# Patient Record
Sex: MALE | Race: WHITE | NOT HISPANIC OR LATINO | Employment: OTHER | URBAN - NONMETROPOLITAN AREA
[De-identification: names, ages, dates, MRNs, and addresses within clinical notes are randomized per-mention and may not be internally consistent; named-entity substitution may affect disease eponyms.]

---

## 2017-01-11 ENCOUNTER — OFFICE VISIT (OUTPATIENT)
Dept: FAMILY MEDICINE CLINIC | Facility: CLINIC | Age: 52
End: 2017-01-11

## 2017-01-11 VITALS
WEIGHT: 194 LBS | SYSTOLIC BLOOD PRESSURE: 126 MMHG | HEIGHT: 70 IN | BODY MASS INDEX: 27.77 KG/M2 | DIASTOLIC BLOOD PRESSURE: 82 MMHG

## 2017-01-11 DIAGNOSIS — M48.061 SPINAL STENOSIS OF LUMBAR REGION: ICD-10-CM

## 2017-01-11 DIAGNOSIS — I10 ESSENTIAL HYPERTENSION: Primary | ICD-10-CM

## 2017-01-11 DIAGNOSIS — Z11.59 NEED FOR HEPATITIS C SCREENING TEST: ICD-10-CM

## 2017-01-11 DIAGNOSIS — R20.0 BILATERAL HAND NUMBNESS: ICD-10-CM

## 2017-01-11 DIAGNOSIS — G89.29 CHRONIC BILATERAL LOW BACK PAIN WITH RIGHT-SIDED SCIATICA: ICD-10-CM

## 2017-01-11 DIAGNOSIS — R11.0 NAUSEA: ICD-10-CM

## 2017-01-11 DIAGNOSIS — M54.41 CHRONIC BILATERAL LOW BACK PAIN WITH RIGHT-SIDED SCIATICA: ICD-10-CM

## 2017-01-11 LAB
ALBUMIN SERPL-MCNC: 3.9 GM/DL (ref 3.4–4.8)
ALP SERPL-CCNC: 66 U/L (ref 38–126)
ALT SERPL-CCNC: 39 U/L (ref 21–72)
ANION GAP SERPL CALCULATED.3IONS-SCNC: 11 MMOL/L (ref 5–15)
AST SERPL-CCNC: 35 U/L (ref 17–59)
BILIRUB SERPL-MCNC: 0.8 MG/DL (ref 0.2–1.3)
BUN SERPL-MCNC: 13 MG/DL (ref 7–21)
CALCIUM SERPL-MCNC: 9.1 MG/DL (ref 8.4–10.2)
CHLORIDE SERPL-SCNC: 101 MMOL/L (ref 95–110)
CO2 SERPL-SCNC: 30 MMOL/L (ref 22–31)
CREAT SERPL-MCNC: 1 MG/DL (ref 0.7–1.3)
GLUCOSE SERPL-MCNC: 91 MG/DL (ref 60–100)
POTASSIUM SERPL-SCNC: 3.5 MMOL/L (ref 3.5–5.1)
PROT SERPL-MCNC: 6.6 GM/DL (ref 6.3–8.6)
SODIUM SERPL-SCNC: 142 MMOL/L (ref 137–145)

## 2017-01-11 PROCEDURE — 99214 OFFICE O/P EST MOD 30 MIN: CPT | Performed by: FAMILY MEDICINE

## 2017-01-11 RX ORDER — LIDOCAINE 50 MG/G
OINTMENT TOPICAL
COMMUNITY
Start: 2016-12-09 | End: 2018-01-24

## 2017-01-11 RX ORDER — ONDANSETRON 4 MG/1
4 TABLET, ORALLY DISINTEGRATING ORAL ONCE
Status: COMPLETED | OUTPATIENT
Start: 2017-01-11 | End: 2017-01-11

## 2017-01-11 RX ORDER — TRAMADOL HYDROCHLORIDE 50 MG/1
50 TABLET ORAL EVERY 6 HOURS PRN
Qty: 120 TABLET | Refills: 1 | Status: SHIPPED | OUTPATIENT
Start: 2017-01-11 | End: 2017-03-10 | Stop reason: SDUPTHER

## 2017-01-11 RX ORDER — ONDANSETRON HYDROCHLORIDE 8 MG/1
4 TABLET, FILM COATED ORAL EVERY 8 HOURS PRN
Qty: 12 TABLET | Refills: 0 | Status: SHIPPED | OUTPATIENT
Start: 2017-01-11 | End: 2017-05-10

## 2017-01-11 RX ADMIN — ONDANSETRON 4 MG: 4 TABLET, ORALLY DISINTEGRATING ORAL at 15:40

## 2017-01-11 NOTE — MR AVS SNAPSHOT
Evangelist Gipson   1/11/2017 2:45 PM   Office Visit    Dept Phone:  [phone]   Encounter #:  [id number]    Provider:  Ashley Watts MD   Department:  [facility] FAMILY MEDICINE                Your Full Care Plan              Today's Medication Changes          These changes are accurate as of: 1/11/17  3:40 PM.  If you have any questions, ask your nurse or doctor.               New Medication(s)Ordered:     ondansetron 8 MG tablet   Commonly known as:  ZOFRAN   Take 0.5 tablets by mouth Every 8 (Eight) Hours As Needed for nausea or vomiting.   Started by:  Ashley Watts MD            Where to Get Your Medications      These medications were sent to [facility] - [place], [place] - [address] - [phone]  - [phone]   [address] [address]     Phone:  [phone]     ondansetron 8 MG tablet         You can get these medications from any pharmacy     Bring a paper prescription for each of these medications     traMADol 50 MG tablet                  Your Updated Medication List          This list is accurate as of: 1/11/17  3:40 PM.  Always use your most recent med list.                albuterol 108 (90 BASE) MCG/ACT inhaler   Commonly known as:  PROAIR RESPICLICK       amLODIPine 10 MG tablet   Commonly known as:  NORVASC   TAKE 1 TABLET BY MOUTH DAILY.       aspirin 81 MG EC tablet       baclofen 20 MG tablet   Commonly known as:  LIORESAL   TAKE ONE TABLET BY MOUTH THREE TIMES A DAY       cetirizine 10 MG tablet   Commonly known as:  zyrTEC   TAKE ONE TABLET EACH DAY.       clonazePAM 0.5 MG tablet   Commonly known as:  KlonoPIN       cyclobenzaprine 5 MG tablet   Commonly known as:  FLEXERIL   TAKE 1-2 TABLETS BY MOUTH TWO TIMES A DAY AS NEEDED.       EPIPEN 2-YENNY 0.3 MG/0.3ML solution auto-injector injection   Generic drug:  EPINEPHrine       fluticasone 50 MCG/ACT nasal spray   Commonly known as:  FLONASE       gabapentin 600 MG tablet   Commonly known as:  NEURONTIN   TAKE ONE TABLET BY MOUTH THREE TIMES A DAY       hydrochlorothiazide 25 MG tablet   Commonly known as:  HYDRODIURIL   Take 1 tablet by mouth Daily.       lidocaine 5 % ointment   Commonly known as:  XYLOCAINE       * metoprolol tartrate 100 MG tablet   Commonly known as:  LOPRESSOR   Take 1 tablet by mouth 2 (Two) Times a Day.       * metoprolol tartrate 100 MG tablet   Commonly known as:  LOPRESSOR   TAKE 1 TABLET BY MOUTH 2 (TWO) TIMES A DAY.       montelukast 10 MG tablet   Commonly known as:  SINGULAIR       ondansetron 8 MG tablet   Commonly known as:  ZOFRAN   Take 0.5 tablets by mouth Every 8 (Eight) Hours As Needed for nausea or vomiting.       pravastatin 20 MG tablet   Commonly known as:  PRAVACHOL   Take 1 tablet by mouth Daily.       traMADol 50 MG tablet   Commonly known as:  ULTRAM   Take 1 tablet by mouth Every 6 (Six) Hours As Needed for moderate pain (4-6).       * Notice:  This list has 2 medication(s) that are the same as other medications prescribed for you. Read the directions carefully, and ask your doctor or other care provider to review them with you.            We Performed the Following     Ambulatory Referral to Neurology     Comprehensive Metabolic Panel     Hepatitis C Antibody       You Were Diagnosed With        Codes Comments    Essential hypertension    -  Primary ICD-10-CM: I10  ICD-9-CM: 401.9     Bilateral hand numbness     ICD-10-CM: R20.0  ICD-9-CM: 782.0     Chronic bilateral low back pain with right-sided sciatica     ICD-10-CM: M54.41, G89.29  ICD-9-CM: 724.2, 724.3, 338.29     Spinal stenosis of lumbar region     ICD-10-CM: M48.06  ICD-9-CM: 724.02     Need for hepatitis C screening test     ICD-10-CM: Z11.59  ICD-9-CM: V73.89     Nausea     ICD-10-CM: R11.0  ICD-9-CM: 787.02       Medications to be Given to You by a Medical Professional     Due       Frequency    1/11/2017 ondansetron ODT (ZOFRAN-ODT) disintegrating  tablet 4 mg  Once      Instructions     None    Patient Instructions History      Upcoming Appointments     Visit Type Date Time Department    FOLLOW UP 2017  2:45 PM [facility] FAM MED MAD 4TH    FOLLOW UP 2017 10:30 AM [facility] PAIN MNGT [facility]AD    NEW PATIENT 2017  2:00 PM [facility] CT VAS SURGERY MAD    OFFICE VISIT 3/10/2017 11:00 AM [facility] FAM MED MAD 4TH      MyChart Signup     [facility] "[employer] allows you to send messages to your doctor, view your test results, renew your prescriptions, schedule appointments, and more. To sign up, go to [email or web address] and click on the Sign Up Now link in the New User? box. Enter your "[employer] Activation Code exactly as it appears below along with the last four digits of your Social Security Number and your Date of Birth () to complete the sign-up process. If you do not sign up before the expiration date, you must request a new code.    "[employer] Activation Code: [email or web address]  Expires: 2017  3:40 PM    If you have questions, you can email [email or web address] or call [phone] to talk to our [employer] staff. Remember, "[employer] is NOT to be used for urgent needs. For medical emergencies, dial 911.               Other Info from Your Visit           Your Appointments     2017 10:30 AM CST   Follow Up with Dickson Whitlock MD   [facility] PAIN MANAGEMENT (--)    [address]  [address] [place] [id number] [phone]           Arrive 15 minutes prior to appointment.            2017  2:00 PM CST   New Patient with William Stevenson MD   [facility] CARDIOTHORACIC AND VASCULAR SURGERY (--)    [address]  [address] [place] [id number] [phone]           Bring all previous medical records and films, along with current medications and insurance information.            Mar 10, 2017 11:00 AM CST   Office Visit with Ashley Watts MD   [facility]  "[facility] FAMILY MEDICINE (--)    [address]  [address] [place] [id number] [phone]           Arrive 15 minutes prior to appointment.              Allergies     Ambien [Zolpidem Tartrate]      Beef-derived Products      Dairy Aid [Lactase]      Latex      Pork-derived Products      Remeron [Mirtazapine]        Reason for Visit     Follow-up recheck for malignant hypertension      Vital Signs     Blood Pressure Height Weight Body Mass Index Smoking Status       126/82 70\" (177.8 cm) 194 lb (88 kg) 27.84 kg/m2 Former Smoker       Problems and Diagnoses Noted     High blood pressure    Narrowing of spinal canal    Bilateral hand numbness        Chronic bilateral low back pain with right-sided sciatica        Need for hepatitis C screening test        Nauseous            "

## 2017-01-11 NOTE — PROGRESS NOTES
Subjective   Evangelist Gipson is a 51 y.o. male.     Hypertension   This is a chronic problem. The current episode started more than 1 year ago. The problem is unchanged. The problem is controlled. Associated symptoms include headaches, malaise/fatigue and neck pain. Pertinent negatives include no anxiety, blurred vision, chest pain, orthopnea, palpitations, peripheral edema, PND, shortness of breath or sweats. There are no associated agents to hypertension. Past treatments include calcium channel blockers, diuretics and beta blockers. The current treatment provides moderate improvement. There are no compliance problems.  There is no history of angina, kidney disease, CAD/MI, CVA, heart failure, left ventricular hypertrophy, PVD or retinopathy.   Back Pain   This is a chronic problem. The current episode started more than 1 month ago. The problem occurs constantly. The problem has been gradually worsening since onset. The pain is present in the lumbar spine and thoracic spine (Has moved up to his mid back too). The quality of the pain is described as aching and shooting. The pain radiates to the right thigh. The pain is at a severity of 8/10. The pain is moderate. The pain is the same all the time. The symptoms are aggravated by lying down. Stiffness is present all day. Associated symptoms include headaches, leg pain, numbness, tingling and weakness. Pertinent negatives include no chest pain. He has tried analgesics (He has an appointment with Pain Management to try injections again.  Tramadol is helping him a lot.) for the symptoms. The treatment provided mild relief.     He has been having issues with his hands going numb and he has been dropping things and they go numb.  Has been progressive over last several months.      Current Outpatient Prescriptions:   •  albuterol (PROAIR RESPICLICK) 108 (90 BASE) MCG/ACT inhaler, Inhale 2 puffs every 4 (four) hours as needed for shortness of air., Disp: , Rfl:   •   amLODIPine (NORVASC) 10 MG tablet, TAKE 1 TABLET BY MOUTH DAILY., Disp: 30 tablet, Rfl: 2  •  aspirin 81 MG EC tablet, Take 81 mg by mouth Daily., Disp: , Rfl:   •  baclofen (LIORESAL) 20 MG tablet, TAKE ONE TABLET BY MOUTH THREE TIMES A DAY, Disp: 90 tablet, Rfl: 2  •  cetirizine (zyrTEC) 10 MG tablet, TAKE ONE TABLET EACH DAY., Disp: 90 tablet, Rfl: 8  •  clonazePAM (KlonoPIN) 0.5 MG tablet, Take 0.5 mg by mouth 3 (three) times a day as needed., Disp: , Rfl:   •  cyclobenzaprine (FLEXERIL) 5 MG tablet, TAKE 1-2 TABLETS BY MOUTH TWO TIMES A DAY AS NEEDED., Disp: 120 tablet, Rfl: 2  •  EPINEPHrine (EPIPEN 2-YENNY) 0.3 MG/0.3ML solution auto-injector injection, , Disp: , Rfl:   •  fluticasone (FLONASE) 50 MCG/ACT nasal spray, 1 spray into each nostril 2 (two) times a day., Disp: , Rfl:   •  gabapentin (NEURONTIN) 600 MG tablet, TAKE ONE TABLET BY MOUTH THREE TIMES A DAY, Disp: 90 tablet, Rfl: 2  •  hydrochlorothiazide (HYDRODIURIL) 25 MG tablet, Take 1 tablet by mouth Daily., Disp: 30 tablet, Rfl: 3  •  lidocaine (XYLOCAINE) 5 % ointment, , Disp: , Rfl:   •  metoprolol tartrate (LOPRESSOR) 100 MG tablet, Take 1 tablet by mouth 2 (Two) Times a Day., Disp: 60 tablet, Rfl: 2  •  metoprolol tartrate (LOPRESSOR) 100 MG tablet, TAKE 1 TABLET BY MOUTH 2 (TWO) TIMES A DAY., Disp: 60 tablet, Rfl: 2  •  montelukast (SINGULAIR) 10 MG tablet, Take 10 mg by mouth every night., Disp: , Rfl:   •  pravastatin (PRAVACHOL) 20 MG tablet, Take 1 tablet by mouth Daily., Disp: 90 tablet, Rfl: 2  •  traMADol (ULTRAM) 50 MG tablet, Take 1 tablet by mouth Every 6 (Six) Hours As Needed for moderate pain (4-6)., Disp: 120 tablet, Rfl: 1    The following portions of the patient's history were reviewed and updated as appropriate: allergies, current medications, past family history, past medical history, past social history, past surgical history and problem list.    Review of Systems   Constitutional: Positive for fatigue and malaise/fatigue.  "Negative for activity change, appetite change and unexpected weight change.   Eyes: Negative for blurred vision.   Respiratory: Negative for chest tightness and shortness of breath.    Cardiovascular: Negative for chest pain, palpitations, orthopnea, leg swelling and PND.   Musculoskeletal: Positive for arthralgias, back pain, gait problem, myalgias and neck pain.   Skin: Negative for pallor, rash and wound.   Neurological: Positive for tingling, weakness, numbness and headaches.   Psychiatric/Behavioral: Positive for sleep disturbance. Negative for decreased concentration, dysphoric mood, self-injury and suicidal ideas. The patient is not nervous/anxious.        Objective    Vitals:    01/11/17 1504   BP: 126/82   Weight: 194 lb (88 kg)   Height: 70\" (177.8 cm)       Physical Exam   Constitutional: He appears well-developed and well-nourished. No distress.   HENT:   Head: Normocephalic and atraumatic.   Cardiovascular: Normal rate, regular rhythm and normal heart sounds.    No murmur heard.  Pulmonary/Chest: Effort normal and breath sounds normal. No respiratory distress. He exhibits no tenderness.   Musculoskeletal:        Lumbar back: He exhibits decreased range of motion, tenderness, laceration and pain. He exhibits no deformity.   Weakened  strength on the right upper ext.  Positive straight leg test on the left lower side.   Psychiatric: He has a normal mood and affect. His behavior is normal. Judgment and thought content normal.   Nursing note and vitals reviewed.      Assessment/Plan   Problems Addressed this Visit        Cardiovascular and Mediastinum    Hypertension - Primary    Relevant Orders    Comprehensive Metabolic Panel (Completed)       Other    Spinal stenosis of lumbar region    Relevant Medications    traMADol (ULTRAM) 50 MG tablet      Other Visit Diagnoses     Chronic bilateral low back pain with right-sided sciatica        Bilateral hand numbness        Relevant Orders    Ambulatory " Referral to Neurology    Need for hepatitis C screening test        Relevant Orders    Hepatitis C Antibody    Nausea        Relevant Medications    ondansetron ODT (ZOFRAN-ODT) disintegrating tablet 4 mg (Completed)        1.) HTN-  Will continue current medications.  Reviewed logs.  Has been working well.  Check CMP today.  2.) Spinal Stenosis/back pain-  Follow-up with Dr. Whitlock as planned.  I believe that he may be candidate for surgery.  He had been seeing neurosurgeon in [place], but he wants him to try the injections first.  Will continue tramdol for now.    3.) Hand tingling and weaknes-  Referred to neurology for EMG.  I am concerned that this is from neck/ thoracic spine issues.  4.) Hep C screening today.  RTC in 2 months or sooner PRN

## 2017-01-13 LAB — HCV AB SERPL QL IA: NEGATIVE

## 2017-01-16 ENCOUNTER — TELEPHONE (OUTPATIENT)
Dept: FAMILY MEDICINE CLINIC | Facility: CLINIC | Age: 52
End: 2017-01-16

## 2017-01-16 ENCOUNTER — OFFICE VISIT (OUTPATIENT)
Dept: PAIN MEDICINE | Facility: CLINIC | Age: 52
End: 2017-01-16

## 2017-01-16 VITALS
SYSTOLIC BLOOD PRESSURE: 160 MMHG | HEIGHT: 70 IN | DIASTOLIC BLOOD PRESSURE: 94 MMHG | BODY MASS INDEX: 27.4 KG/M2 | WEIGHT: 191.4 LBS

## 2017-01-16 DIAGNOSIS — M54.16 LUMBAR RADICULOPATHY: ICD-10-CM

## 2017-01-16 DIAGNOSIS — M47.817 LUMBOSACRAL SPONDYLOSIS WITHOUT MYELOPATHY: ICD-10-CM

## 2017-01-16 DIAGNOSIS — M50.30 DDD (DEGENERATIVE DISC DISEASE), CERVICAL: ICD-10-CM

## 2017-01-16 DIAGNOSIS — M48.061 SPINAL STENOSIS OF LUMBAR REGION: Primary | ICD-10-CM

## 2017-01-16 PROCEDURE — 99212 OFFICE O/P EST SF 10 MIN: CPT | Performed by: PAIN MEDICINE

## 2017-01-16 NOTE — MR AVS SNAPSHOT
Evangelist Gipson   1/16/2017 10:30 AM   Office Visit    Dept Phone:  [phone]   Encounter #:  [id number]    Provider:  Dickson Whitlock MD   Department:  [facility] PAIN MANAGEMENT                Your Full Care Plan              Your Updated Medication List          This list is accurate as of: 1/16/17 11:05 AM.  Always use your most recent med list.                albuterol 108 (90 BASE) MCG/ACT inhaler   Commonly known as:  PROAIR RESPICLICK       amLODIPine 10 MG tablet   Commonly known as:  NORVASC   TAKE 1 TABLET BY MOUTH DAILY.       aspirin 81 MG EC tablet       baclofen 20 MG tablet   Commonly known as:  LIORESAL   TAKE ONE TABLET BY MOUTH THREE TIMES A DAY       cetirizine 10 MG tablet   Commonly known as:  zyrTEC   TAKE ONE TABLET EACH DAY.       clonazePAM 0.5 MG tablet   Commonly known as:  KlonoPIN       cyclobenzaprine 5 MG tablet   Commonly known as:  FLEXERIL   TAKE 1-2 TABLETS BY MOUTH TWO TIMES A DAY AS NEEDED.       EPIPEN 2-YENNY 0.3 MG/0.3ML solution auto-injector injection   Generic drug:  EPINEPHrine       fluticasone 50 MCG/ACT nasal spray   Commonly known as:  FLONASE       gabapentin 600 MG tablet   Commonly known as:  NEURONTIN   TAKE ONE TABLET BY MOUTH THREE TIMES A DAY       hydrochlorothiazide 25 MG tablet   Commonly known as:  HYDRODIURIL   Take 1 tablet by mouth Daily.       lidocaine 5 % ointment   Commonly known as:  XYLOCAINE       * metoprolol tartrate 100 MG tablet   Commonly known as:  LOPRESSOR   Take 1 tablet by mouth 2 (Two) Times a Day.       * metoprolol tartrate 100 MG tablet   Commonly known as:  LOPRESSOR   TAKE 1 TABLET BY MOUTH 2 (TWO) TIMES A DAY.       montelukast 10 MG tablet   Commonly known as:  SINGULAIR       ondansetron 8 MG tablet   Commonly known as:  ZOFRAN   Take 0.5 tablets by mouth Every 8 (Eight) Hours As Needed for nausea or vomiting.       pravastatin 20 MG tablet   Commonly known as:  PRAVACHOL   Take 1  tablet by mouth Daily.       traMADol 50 MG tablet   Commonly known as:  ULTRAM   Take 1 tablet by mouth Every 6 (Six) Hours As Needed for moderate pain (4-6).       * Notice:  This list has 2 medication(s) that are the same as other medications prescribed for you. Read the directions carefully, and ask your doctor or other care provider to review them with you.            You Were Diagnosed With        Codes Comments    Spinal stenosis of lumbar region    -  Primary ICD-10-CM: M48.06  ICD-9-CM: 724.02     Lumbosacral spondylosis without myelopathy     ICD-10-CM: M47.817  ICD-9-CM: 721.3     DDD (degenerative disc disease), cervical     ICD-10-CM: M50.30  ICD-9-CM: 722.4     Lumbar radiculopathy     ICD-10-CM: M54.16  ICD-9-CM: 724.4       Instructions     None    Patient Instructions History      Upcoming Appointments     Visit Type Date Time Department    FOLLOW UP 2017 10:30 AM [facility] PAIN MNGT MAD    NEW PATIENT 2017  2:00 PM [facility] CT VAS SURGERY MAD    OFFICE VISIT 3/10/2017 11:00 AM [facility] FAM MED MAD 4TH      MyChart Signup     [facility] [employer] allows you to send messages to your doctor, view your test results, renew your prescriptions, schedule appointments, and more. To sign up, go to [employer] and click on the Sign Up Now link in the New User? box. Enter your [employer] Activation Code exactly as it appears below along with the last four digits of your Social Security Number and your Date of Birth () to complete the sign-up process. If you do not sign up before the expiration date, you must request a new code.    [employer] Activation Code: [email or web address]  Expires: 2017  3:40 PM    If you have questions, you can email [email or web address] or call [phone] to talk to our [employer] staff. Remember, [employer] is NOT to be used for urgent needs. For medical emergencies, dial 911.               Other Info from Your Visit           Your Appointments     2017  2:00 PM  "CST   New Patient with William Stevenson MD   [facility] CARDIOTHORACIC AND VASCULAR SURGERY (--)    [address]  [address] [facility] [address] [phone]           Bring all previous medical records and films, along with current medications and insurance information.            Mar 10, 2017 11:00 AM CST   Office Visit with Ashley Watts MD   [facility] FAMILY MEDICINE (--)    [address]  [address] [place] [address] [phone]           Arrive 15 minutes prior to appointment.              Allergies     Ambien [Zolpidem Tartrate]      Beef-derived Products      Dairy Aid [Lactase]      Latex      Pork-derived Products      Remeron [Mirtazapine]        Reason for Visit     Back Pain           Vital Signs     Blood Pressure Height Weight Body Mass Index Smoking Status       160/94 70\" (177.8 cm) 191 lb 6.4 oz (86.8 kg) 27.46 kg/m2 Former Smoker       Problems and Diagnoses Noted     Narrowing of spinal canal    Arthritis of low back        Degeneration of intervertebral disc of cervical region        Lumbar nerve root disorder            "

## 2017-01-16 NOTE — PROGRESS NOTES
Evangelist Gipson is a 51 y.o. male.   1965    HPI:   Location: lower back  Quality: stabbing, sharp and dull  Severity: 8/10  Timing: constant  Alleviating: relaxation  Aggravating: sitting  and ambulating      Utilizing tramadol for pain control.  Had Lesi which helped 100 per cent for 1 month and has gradually lessened in effectiveness to aobut 20% relief today..  Would like to try a second because the first helped so much.      The following portions of the patient's history were reviewed by me and updated as appropriate: allergies, current medications, past family history, past medical history, past social history, past surgical history and problem list.    Past Medical History   Diagnosis Date   • Allergic contact dermatitis    • Allergic rhinitis    • Anxiety    • Anxiety    • Arthritis    • Asthma    • Asthma    • Benign lipomatous neoplasm       Small lipoma clinically right chest wall      • Chest wall tenderness      right side of sternum   • Epilepsy    • Food allergy    • [place] de la Tourette's syndrome      neurology had been seeing him in [place]      • Headache    • Hypertension    • Hypertension    • Hypertension    • Insomnia    • Internal hemorrhoids    • Low back pain    • Malaise and fatigue    • Multiple joint pain    • Need for influenza vaccination    • Neuralgia and neuritis    • Polyp of intestine      +FH, leiomyoma      • Radiculitis    • Seborrheic dermatitis    • Skin sensation disturbance    • Spasm of back muscles    • Spinal stenosis of lumbar region        Social History     Social History   • Marital status:      Spouse name: N/A   • Number of children: N/A   • Years of education: N/A     Occupational History   • Not on file.     Social History Main Topics   • Smoking status: Former Smoker     Quit date: 1996   • Smokeless tobacco: Not on file   • Alcohol use No   • Drug use: No   • Sexual activity: Not on file     Other Topics Concern   • Not on file     Social History  Narrative       Family History   Problem Relation Age of Onset   • Heart disease Mother    • Osteoporosis Mother    • Lung cancer Father    • Prostate cancer Father    • Coronary artery disease Father    • COPD Father    • Hyperlipidemia Father    • Hypertension Father    • Diabetes Maternal Grandmother    • Hypertension Other    • Cancer Other    • Alcohol abuse Brother    • Coronary artery disease Brother    • COPD Brother    • Mental illness Brother          Current Outpatient Prescriptions:   •  albuterol (PROAIR RESPICLICK) 108 (90 BASE) MCG/ACT inhaler, Inhale 2 puffs every 4 (four) hours as needed for shortness of air., Disp: , Rfl:   •  amLODIPine (NORVASC) 10 MG tablet, TAKE 1 TABLET BY MOUTH DAILY., Disp: 30 tablet, Rfl: 2  •  aspirin 81 MG EC tablet, Take 81 mg by mouth Daily., Disp: , Rfl:   •  baclofen (LIORESAL) 20 MG tablet, TAKE ONE TABLET BY MOUTH THREE TIMES A DAY, Disp: 90 tablet, Rfl: 2  •  cetirizine (zyrTEC) 10 MG tablet, TAKE ONE TABLET EACH DAY., Disp: 90 tablet, Rfl: 8  •  clonazePAM (KlonoPIN) 0.5 MG tablet, Take 0.5 mg by mouth 3 (three) times a day as needed., Disp: , Rfl:   •  cyclobenzaprine (FLEXERIL) 5 MG tablet, TAKE 1-2 TABLETS BY MOUTH TWO TIMES A DAY AS NEEDED., Disp: 120 tablet, Rfl: 2  •  EPINEPHrine (EPIPEN 2-YENNY) 0.3 MG/0.3ML solution auto-injector injection, , Disp: , Rfl:   •  fluticasone (FLONASE) 50 MCG/ACT nasal spray, 1 spray into each nostril 2 (two) times a day., Disp: , Rfl:   •  gabapentin (NEURONTIN) 600 MG tablet, TAKE ONE TABLET BY MOUTH THREE TIMES A DAY, Disp: 90 tablet, Rfl: 2  •  hydrochlorothiazide (HYDRODIURIL) 25 MG tablet, Take 1 tablet by mouth Daily., Disp: 30 tablet, Rfl: 3  •  lidocaine (XYLOCAINE) 5 % ointment, , Disp: , Rfl:   •  metoprolol tartrate (LOPRESSOR) 100 MG tablet, Take 1 tablet by mouth 2 (Two) Times a Day., Disp: 60 tablet, Rfl: 2  •  metoprolol tartrate (LOPRESSOR) 100 MG tablet, TAKE 1 TABLET BY MOUTH 2 (TWO) TIMES A DAY., Disp: 60  tablet, Rfl: 2  •  montelukast (SINGULAIR) 10 MG tablet, Take 10 mg by mouth every night., Disp: , Rfl:   •  ondansetron (ZOFRAN) 8 MG tablet, Take 0.5 tablets by mouth Every 8 (Eight) Hours As Needed for nausea or vomiting., Disp: 12 tablet, Rfl: 0  •  pravastatin (PRAVACHOL) 20 MG tablet, Take 1 tablet by mouth Daily., Disp: 90 tablet, Rfl: 2  •  traMADol (ULTRAM) 50 MG tablet, Take 1 tablet by mouth Every 6 (Six) Hours As Needed for moderate pain (4-6)., Disp: 120 tablet, Rfl: 1    Allergies   Allergen Reactions   • Ambien [Zolpidem Tartrate]    • Beef-Derived Products    • Dairy Aid [Lactase]    • Latex    • Pork-Derived Products    • Remeron [Mirtazapine]          Review of Systems   Musculoskeletal: Positive for back pain.     10 system review of systems was reviewed and negative except for above.    Physical Exam   Constitutional: He appears well-developed and well-nourished. No distress.   Musculoskeletal:        Lumbar back: He exhibits decreased range of motion (80 deg flexion ext to 10 deg ).   Neurological: He is alert.   Reflex Scores:       Patellar reflexes are 2+ on the right side and 2+ on the left side.       Achilles reflexes are 2+ on the right side and 2+ on the left side.  slr on right   Psychiatric: He has a normal mood and affect. His behavior is normal. Judgment normal.       Evangelist was seen today for back pain.    Diagnoses and all orders for this visit:    Spinal stenosis of lumbar region    Lumbosacral spondylosis without myelopathy    DDD (degenerative disc disease), cervical  -     Epidural Block; Future    Lumbar radiculopathy  -     Epidural Block; Future        Medication: None at this time.  Tramadol provided by pcp.    Interventional:  I have discussed the risks and benefits of the procedure with the patient.   Repeat LESI.  Still enjoying relief after two months, but not as much as in beginning.  No longer on anticoagulant except asa.  If no relief from this injections, need to  f/u with his neurosurgeon.    Rehab: none at this time    Behavioral: No aberrant behavior noted. JUDITH Report #[id number]  was reviewed and is consistent with stated history    Urine drug screen None at this time          This document has been electronically signed by Dickson Whitlock MD on January 16, 2017 10:56 AM

## 2017-01-23 ENCOUNTER — OFFICE VISIT (OUTPATIENT)
Dept: CARDIAC SURGERY | Facility: CLINIC | Age: 52
End: 2017-01-23

## 2017-01-23 VITALS
OXYGEN SATURATION: 98 % | WEIGHT: 197.5 LBS | BODY MASS INDEX: 28.27 KG/M2 | TEMPERATURE: 98.4 F | SYSTOLIC BLOOD PRESSURE: 130 MMHG | DIASTOLIC BLOOD PRESSURE: 82 MMHG | HEART RATE: 80 BPM | HEIGHT: 70 IN

## 2017-01-23 DIAGNOSIS — M47.816 OSTEOARTHRITIS OF LUMBAR SPINE, UNSPECIFIED SPINAL OSTEOARTHRITIS COMPLICATION STATUS: ICD-10-CM

## 2017-01-23 DIAGNOSIS — M47.812 SPONDYLOSIS OF CERVICAL REGION WITHOUT MYELOPATHY OR RADICULOPATHY: ICD-10-CM

## 2017-01-23 DIAGNOSIS — I65.22 STENOSIS OF LEFT CAROTID ARTERY: Primary | ICD-10-CM

## 2017-01-23 DIAGNOSIS — M48.061 SPINAL STENOSIS OF LUMBAR REGION: ICD-10-CM

## 2017-01-23 DIAGNOSIS — I10 MALIGNANT ESSENTIAL HYPERTENSION: ICD-10-CM

## 2017-01-23 DIAGNOSIS — I10 ESSENTIAL HYPERTENSION: ICD-10-CM

## 2017-01-23 PROCEDURE — 99205 OFFICE O/P NEW HI 60 MIN: CPT | Performed by: THORACIC SURGERY (CARDIOTHORACIC VASCULAR SURGERY)

## 2017-01-26 ENCOUNTER — HOSPITAL ENCOUNTER (OUTPATIENT)
Dept: INTERVENTIONAL RADIOLOGY/VASCULAR | Facility: HOSPITAL | Age: 52
Discharge: HOME | End: 2017-01-26
Admitting: PAIN MEDICINE

## 2017-01-26 VITALS
HEART RATE: 58 BPM | SYSTOLIC BLOOD PRESSURE: 118 MMHG | DIASTOLIC BLOOD PRESSURE: 69 MMHG | RESPIRATION RATE: 18 BRPM | OXYGEN SATURATION: 98 %

## 2017-01-26 DIAGNOSIS — M54.2 NECK PAIN: ICD-10-CM

## 2017-01-26 DIAGNOSIS — M54.16 LUMBAR RADICULOPATHY: Primary | ICD-10-CM

## 2017-01-26 DIAGNOSIS — M51.36 DDD (DEGENERATIVE DISC DISEASE), LUMBAR: ICD-10-CM

## 2017-01-26 DIAGNOSIS — R29.898 WEAKNESS OF RIGHT HAND: Primary | ICD-10-CM

## 2017-01-26 PROCEDURE — 62323 NJX INTERLAMINAR LMBR/SAC: CPT | Performed by: PAIN MEDICINE

## 2017-01-26 PROCEDURE — 0 IOPAMIDOL 61 % SOLUTION: Performed by: PAIN MEDICINE

## 2017-01-26 PROCEDURE — 25010000002 METHYLPREDNISOLONE PER 80 MG: Performed by: PAIN MEDICINE

## 2017-01-26 RX ORDER — LIDOCAINE HYDROCHLORIDE 20 MG/ML
INJECTION, SOLUTION INFILTRATION; PERINEURAL
Status: COMPLETED | OUTPATIENT
Start: 2017-01-26 | End: 2017-01-26

## 2017-01-26 RX ORDER — METHYLPREDNISOLONE ACETATE 80 MG/ML
INJECTION, SUSPENSION INTRA-ARTICULAR; INTRALESIONAL; INTRAMUSCULAR; SOFT TISSUE
Status: COMPLETED | OUTPATIENT
Start: 2017-01-26 | End: 2017-01-26

## 2017-01-26 RX ADMIN — METHYLPREDNISOLONE ACETATE 80 MG: 80 INJECTION, SUSPENSION INTRA-ARTICULAR; INTRALESIONAL; INTRAMUSCULAR; SOFT TISSUE at 14:28

## 2017-01-26 RX ADMIN — IOPAMIDOL 2 ML: 612 INJECTION, SOLUTION INTRAVENOUS at 14:27

## 2017-01-26 RX ADMIN — LIDOCAINE HYDROCHLORIDE 2 ML: 20 INJECTION, SOLUTION INFILTRATION; PERINEURAL at 14:27

## 2017-01-27 RX ORDER — GABAPENTIN 600 MG/1
TABLET ORAL
Qty: 90 TABLET | Refills: 2 | Status: SHIPPED | OUTPATIENT
Start: 2017-01-27 | End: 2017-05-01 | Stop reason: SDUPTHER

## 2017-01-30 ENCOUNTER — HOSPITAL ENCOUNTER (OUTPATIENT)
Dept: MRI IMAGING | Facility: HOSPITAL | Age: 52
Discharge: HOME | End: 2017-01-30

## 2017-01-30 ENCOUNTER — HOSPITAL ENCOUNTER (OUTPATIENT)
Dept: MRI IMAGING | Facility: HOSPITAL | Age: 52
Discharge: HOME | End: 2017-01-30
Admitting: FAMILY MEDICINE

## 2017-01-30 DIAGNOSIS — M54.2 NECK PAIN: ICD-10-CM

## 2017-01-30 DIAGNOSIS — R29.898 WEAKNESS OF RIGHT HAND: ICD-10-CM

## 2017-01-30 PROCEDURE — 72141 MRI NECK SPINE W/O DYE: CPT

## 2017-01-30 PROCEDURE — 72146 MRI CHEST SPINE W/O DYE: CPT

## 2017-03-07 DIAGNOSIS — I10 ESSENTIAL HYPERTENSION: ICD-10-CM

## 2017-03-07 RX ORDER — HYDROCHLOROTHIAZIDE 25 MG/1
TABLET ORAL
Qty: 30 TABLET | Refills: 3 | Status: SHIPPED | OUTPATIENT
Start: 2017-03-07 | End: 2017-06-27 | Stop reason: SDUPTHER

## 2017-03-07 RX ORDER — BACLOFEN 20 MG/1
TABLET ORAL
Qty: 90 TABLET | Refills: 2 | Status: SHIPPED | OUTPATIENT
Start: 2017-03-07 | End: 2017-05-30 | Stop reason: SDUPTHER

## 2017-03-10 ENCOUNTER — OFFICE VISIT (OUTPATIENT)
Dept: FAMILY MEDICINE CLINIC | Facility: CLINIC | Age: 52
End: 2017-03-10

## 2017-03-10 VITALS
SYSTOLIC BLOOD PRESSURE: 132 MMHG | HEIGHT: 70 IN | BODY MASS INDEX: 28.26 KG/M2 | DIASTOLIC BLOOD PRESSURE: 80 MMHG | WEIGHT: 197.4 LBS | HEART RATE: 65 BPM

## 2017-03-10 DIAGNOSIS — I10 ESSENTIAL HYPERTENSION: Primary | ICD-10-CM

## 2017-03-10 DIAGNOSIS — M48.061 SPINAL STENOSIS OF LUMBAR REGION: ICD-10-CM

## 2017-03-10 DIAGNOSIS — G56.03 BILATERAL CARPAL TUNNEL SYNDROME: ICD-10-CM

## 2017-03-10 PROBLEM — M47.816 OSTEOARTHRITIS OF LUMBAR SPINE: Status: ACTIVE | Noted: 2017-02-14

## 2017-03-10 PROCEDURE — 99213 OFFICE O/P EST LOW 20 MIN: CPT | Performed by: FAMILY MEDICINE

## 2017-03-10 RX ORDER — TRAMADOL HYDROCHLORIDE 50 MG/1
50 TABLET ORAL EVERY 6 HOURS PRN
Qty: 120 TABLET | Refills: 2 | Status: SHIPPED | OUTPATIENT
Start: 2017-03-10 | End: 2017-06-09 | Stop reason: SDUPTHER

## 2017-03-10 NOTE — PROGRESS NOTES
Subjective   Evangelist Gipson is a 51 y.o. male.     Hypertension   This is a chronic problem. The current episode started more than 1 year ago. The problem is unchanged. The problem is controlled. Pertinent negatives include no anxiety, blurred vision, chest pain, headaches, malaise/fatigue, neck pain, orthopnea, palpitations, peripheral edema, PND, shortness of breath or sweats. There are no associated agents to hypertension. There are no known risk factors for coronary artery disease. Past treatments include calcium channel blockers, beta blockers and diuretics. The current treatment provides moderate improvement. There are no compliance problems.  There is no history of angina, kidney disease, CAD/MI, CVA, heart failure, left ventricular hypertrophy, PVD or retinopathy.   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  He saw neurology and had EMG that was suggestive of carpal tunnel braces and that has helped but he has been breaking out from the braces.  He is allergic to latex.  May need different braces.  He tried stockings and that he could try to wear and that didn't help.  He has had had back pain and that isn't getting any better.  He has been seeing neurosurgery and they are getting more imaging.  He has had epidural injections and those didn't seem to help last time.  Tramadol seems to help pretty well.  Would like to have surgery. Has appointment for further imaging.      Current Outpatient Prescriptions:   •  albuterol (PROAIR RESPICLICK) 108 (90 BASE) MCG/ACT inhaler, Inhale 2 puffs every 4 (four) hours as needed for shortness of air., Disp: , Rfl:   •  amLODIPine (NORVASC) 10 MG tablet, TAKE 1 TABLET BY MOUTH DAILY., Disp: 30 tablet, Rfl: 2  •  aspirin 81 MG EC tablet, Take 81 mg by mouth Daily., Disp: , Rfl:   •  baclofen (LIORESAL) 20 MG tablet, TAKE ONE TABLET BY MOUTH THREE TIMES A DAY, Disp: 90 tablet, Rfl: 2  •  cetirizine (zyrTEC) 10 MG  tablet, TAKE ONE TABLET EACH DAY., Disp: 90 tablet, Rfl: 8  •  clonazePAM (KlonoPIN) 0.5 MG tablet, Take 0.5 mg by mouth 3 (three) times a day as needed., Disp: , Rfl:   •  cyclobenzaprine (FLEXERIL) 5 MG tablet, TAKE 1-2 TABLETS BY MOUTH TWO TIMES A DAY AS NEEDED., Disp: 120 tablet, Rfl: 2  •  EPINEPHrine (EPIPEN 2-YENNY) 0.3 MG/0.3ML solution auto-injector injection, , Disp: , Rfl:   •  fluticasone (FLONASE) 50 MCG/ACT nasal spray, 1 spray into each nostril 2 (two) times a day., Disp: , Rfl:   •  gabapentin (NEURONTIN) 600 MG tablet, TAKE ONE TABLET BY MOUTH THREE TIMES A DAY, Disp: 90 tablet, Rfl: 2  •  hydrochlorothiazide (HYDRODIURIL) 25 MG tablet, TAKE 1 TABLET BY MOUTH DAILY., Disp: 30 tablet, Rfl: 3  •  lidocaine (XYLOCAINE) 5 % ointment, , Disp: , Rfl:   •  metoprolol tartrate (LOPRESSOR) 100 MG tablet, Take 1 tablet by mouth 2 (Two) Times a Day., Disp: 60 tablet, Rfl: 2  •  montelukast (SINGULAIR) 10 MG tablet, Take 10 mg by mouth every night., Disp: , Rfl:   •  ondansetron (ZOFRAN) 8 MG tablet, Take 0.5 tablets by mouth Every 8 (Eight) Hours As Needed for nausea or vomiting., Disp: 12 tablet, Rfl: 0  •  pravastatin (PRAVACHOL) 20 MG tablet, Take 1 tablet by mouth Daily., Disp: 90 tablet, Rfl: 2  •  traMADol (ULTRAM) 50 MG tablet, Take 1 tablet by mouth Every 6 (Six) Hours As Needed for moderate pain (4-6)., Disp: 120 tablet, Rfl: 1    The following portions of the patient's history were reviewed and updated as appropriate: allergies, current medications, past family history, past medical history, past social history, past surgical history and problem list.    Review of Systems   Constitutional: Positive for fatigue. Negative for activity change, appetite change, malaise/fatigue and unexpected weight change.   Eyes: Negative for blurred vision.   Respiratory: Negative for chest tightness and shortness of breath.    Cardiovascular: Negative for chest pain, palpitations, orthopnea, leg swelling and PND.  "  Musculoskeletal: Positive for arthralgias, back pain, gait problem and myalgias. Negative for neck pain.   Skin: Negative for pallor, rash and wound.   Neurological: Positive for weakness and numbness. Negative for headaches.   Psychiatric/Behavioral: Negative for decreased concentration, dysphoric mood, self-injury, sleep disturbance and suicidal ideas. The patient is not nervous/anxious.        Objective    Vitals:    03/10/17 1105   BP: 132/80   BP Location: Left arm   Patient Position: Sitting   Cuff Size: Adult   Pulse: 65   Weight: 197 lb 6.4 oz (89.5 kg)   Height: 70\" (177.8 cm)       Physical Exam   Constitutional: He appears well-developed and well-nourished. No distress.   HENT:   Head: Normocephalic and atraumatic.   Cardiovascular: Normal rate, regular rhythm and normal heart sounds.    No murmur heard.  Pulmonary/Chest: Effort normal and breath sounds normal. No respiratory distress. He exhibits no tenderness.   Musculoskeletal:        Lumbar back: He exhibits decreased range of motion, tenderness, laceration and pain. He exhibits no deformity.   Weakened  strength on the right upper ext.  Positive straight leg test on the left lower side.   Psychiatric: He has a normal mood and affect. His behavior is normal. Judgment and thought content normal.   Nursing note and vitals reviewed.      Assessment/Plan   Problems Addressed this Visit        Cardiovascular and Mediastinum    Hypertension - Primary       Other    Spinal stenosis of lumbar region    Relevant Medications    traMADol (ULTRAM) 50 MG tablet      Other Visit Diagnoses     Bilateral carpal tunnel syndrome            1.) HTN- Doing much better and less side effects with medication that he is on.  Will continue and continue to monitor at home as well.  2.) Spinal Stenosis- Follow-up with neurosurgery as planned.  Continue tramadol for now.  3.) Carpal Tunnel-  Wrote for latex free braces today and will see if that is better tolerated.   RTC " in 2 months or sooner PRN

## 2017-03-14 DIAGNOSIS — I10 ESSENTIAL HYPERTENSION: ICD-10-CM

## 2017-03-14 RX ORDER — AMLODIPINE BESYLATE 10 MG/1
TABLET ORAL
Qty: 30 TABLET | Refills: 2 | Status: SHIPPED | OUTPATIENT
Start: 2017-03-14 | End: 2017-06-05 | Stop reason: SDUPTHER

## 2017-03-14 RX ORDER — METOPROLOL TARTRATE 100 MG/1
TABLET ORAL
Qty: 60 TABLET | Refills: 2 | Status: SHIPPED | OUTPATIENT
Start: 2017-03-14 | End: 2017-05-10

## 2017-03-14 RX ORDER — CYCLOBENZAPRINE HCL 5 MG
TABLET ORAL
Qty: 120 TABLET | Refills: 2 | Status: SHIPPED | OUTPATIENT
Start: 2017-03-14 | End: 2017-09-21 | Stop reason: SDUPTHER

## 2017-03-22 RX ORDER — MONTELUKAST SODIUM 10 MG/1
TABLET ORAL
Qty: 90 TABLET | Refills: 2 | Status: SHIPPED | OUTPATIENT
Start: 2017-03-22 | End: 2017-12-07 | Stop reason: SDUPTHER

## 2017-04-04 PROBLEM — I65.22 STENOSIS OF LEFT CAROTID ARTERY: Status: ACTIVE | Noted: 2017-04-04

## 2017-04-05 NOTE — PROGRESS NOTES
1/23/2017    Evangelist Gipson  1965      Chief Complaint:    Chief Complaint   Patient presents with   • Abnormal MRI       HPI:      PCP:  Ashley Watts MD  Cardiology:  Dr Causey ([facility])      51 y.o. male with HTN, carotid stenosis, CAD,  tourettes, DJD, anxiety, chronic back pain, DAVY.  former smoker.  Moderate vibration head when bending over x 6 months.  Abnormal MRI brain.  Controlled sleep apnea on bipap x 2 months.  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  No other associated signs, symptoms or modifying factors.    12/2014 Carotid Duplex:  PAXTON 0-15% (85cm/s), LICA 0-15% (122cm/s)  7/2016 Carotid Duplex:  PAXTON 0-15% (76cm/s) antegrade vert, LICA 0-15% (68cm/s) antegrade vert  7/2016 MRI Neck: PAXTON small ulceration posterior medial proximal ICA.  LICA 35%  7/2016 MRI Brain:  Mild microvascular disease.  11mm LEFT maxillary sinus retention cyst.    The following portions of the patient's history were reviewed and updated as appropriate: allergies, current medications, past family history, past medical history, past social history, past surgical history and problem list.  Recent images independently reviewed.  Available laboratory values reviewed.    PMH:  Past Medical History:   Diagnosis Date   • Allergic contact dermatitis    • Allergic rhinitis    • Anxiety    • Anxiety    • Arthritis    • Asthma    • Asthma    • Benign lipomatous neoplasm      Small lipoma clinically right chest wall      • Chest wall tenderness     right side of sternum   • Epilepsy    • Food allergy    • [place] de la Tourette's syndrome     neurology had been seeing him in [place]      • Headache    • Hypertension    • Hypertension    • Hypertension    • Insomnia    • Internal hemorrhoids    • Low back pain    • Malaise and fatigue    • Multiple joint pain    • Need for influenza vaccination    • Neuralgia and neuritis    • Polyp of intestine     +FH, leiomyoma      • Radiculitis    • Seborrheic  dermatitis    • Skin sensation disturbance    • Spasm of back muscles    • Spinal stenosis of lumbar region        ALLERGIES:  Allergies   Allergen Reactions   • Ambien [Zolpidem Tartrate]    • Beef-Derived Products    • Dairy Aid [Lactase]    • Latex    • Pork-Derived Products    • Remeron [Mirtazapine]          MEDICATIONS:    Current Outpatient Prescriptions:   •  albuterol (PROAIR RESPICLICK) 108 (90 BASE) MCG/ACT inhaler, Inhale 2 puffs every 4 (four) hours as needed for shortness of air., Disp: , Rfl:   •  aspirin 81 MG EC tablet, Take 81 mg by mouth Daily., Disp: , Rfl:   •  cetirizine (zyrTEC) 10 MG tablet, TAKE ONE TABLET EACH DAY., Disp: 90 tablet, Rfl: 8  •  clonazePAM (KlonoPIN) 0.5 MG tablet, Take 0.5 mg by mouth 3 (three) times a day as needed., Disp: , Rfl:   •  EPINEPHrine (EPIPEN 2-YENNY) 0.3 MG/0.3ML solution auto-injector injection, , Disp: , Rfl:   •  fluticasone (FLONASE) 50 MCG/ACT nasal spray, 1 spray into each nostril 2 (two) times a day., Disp: , Rfl:   •  lidocaine (XYLOCAINE) 5 % ointment, , Disp: , Rfl:   •  metoprolol tartrate (LOPRESSOR) 100 MG tablet, Take 1 tablet by mouth 2 (Two) Times a Day., Disp: 60 tablet, Rfl: 2  •  ondansetron (ZOFRAN) 8 MG tablet, Take 0.5 tablets by mouth Every 8 (Eight) Hours As Needed for nausea or vomiting., Disp: 12 tablet, Rfl: 0  •  pravastatin (PRAVACHOL) 20 MG tablet, Take 1 tablet by mouth Daily., Disp: 90 tablet, Rfl: 2  •  amLODIPine (NORVASC) 10 MG tablet, TAKE 1 TABLET BY MOUTH DAILY., Disp: 30 tablet, Rfl: 2  •  baclofen (LIORESAL) 20 MG tablet, TAKE ONE TABLET BY MOUTH THREE TIMES A DAY, Disp: 90 tablet, Rfl: 2  •  cyclobenzaprine (FLEXERIL) 5 MG tablet, TAKE 1-2 TABLETS BY MOUTH TWO TIMES A DAY AS NEEDED., Disp: 120 tablet, Rfl: 2  •  gabapentin (NEURONTIN) 600 MG tablet, TAKE ONE TABLET BY MOUTH THREE TIMES A DAY, Disp: 90 tablet, Rfl: 2  •  hydrochlorothiazide (HYDRODIURIL) 25 MG tablet, TAKE 1 TABLET BY MOUTH DAILY., Disp: 30 tablet, Rfl:  3  •  metoprolol tartrate (LOPRESSOR) 100 MG tablet, TAKE 1 TABLET BY MOUTH 2 (TWO) TIMES A DAY., Disp: 60 tablet, Rfl: 2  •  montelukast (SINGULAIR) 10 MG tablet, TAKE ONE TABLET BY MOUTH AT BEDTIME, Disp: 90 tablet, Rfl: 2  •  traMADol (ULTRAM) 50 MG tablet, Take 1 tablet by mouth Every 6 (Six) Hours As Needed for moderate pain (4-6)., Disp: 120 tablet, Rfl: 2    Review of Systems   Review of Systems   Constitution: Positive for fever, malaise/fatigue, night sweats and weight gain. Negative for weight loss.   HENT: Positive for headaches. Negative for hearing loss, hoarse voice and stridor.    Eyes: Negative for vision loss in left eye, vision loss in right eye and visual disturbance.   Cardiovascular: Positive for chest pain. Negative for leg swelling and palpitations.   Respiratory: Negative for cough, hemoptysis and shortness of breath.    Hematologic/Lymphatic: Negative for adenopathy and bleeding problem. Bruises/bleeds easily.   Skin: Negative for color change, poor wound healing and rash.   Musculoskeletal: Positive for arthritis, back pain, muscle weakness and neck pain.   Gastrointestinal: Positive for dysphagia. Negative for abdominal pain and heartburn.   Neurological: Positive for dizziness and seizures. Negative for numbness.   Psychiatric/Behavioral: Negative for altered mental status, depression and memory loss. The patient is not nervous/anxious.        Physical Exam   Physical Exam   Constitutional: He is oriented to person, place, and time. He is active and cooperative. He does not appear ill. No distress.   HENT:   Head: Atraumatic.   Right Ear: Hearing normal.   Left Ear: Hearing normal.   Nose: No nasal deformity. No epistaxis.   Mouth/Throat: He does not have dentures. Normal dentition.   Eyes: Conjunctivae and lids are normal. Right pupil is round and reactive. Left pupil is round and reactive.   Neck: No JVD present. Carotid bruit is not present. No tracheal deviation present. No thyroid  mass and no thyromegaly present.   Cardiovascular: Normal rate and regular rhythm.    No murmur heard.  Pulses:       Carotid pulses are 2+ on the right side, and 2+ on the left side.       Radial pulses are 2+ on the right side, and 2+ on the left side.        Dorsalis pedis pulses are 2+ on the right side, and 2+ on the left side.        Posterior tibial pulses are 2+ on the right side, and 2+ on the left side.   Pulmonary/Chest: Effort normal and breath sounds normal.   Abdominal: Soft. He exhibits no distension and no mass. There is no splenomegaly or hepatomegaly. There is no tenderness.   Musculoskeletal: He exhibits no deformity.   Gait normal.    Lymphadenopathy:     He has no cervical adenopathy.        Right: No supraclavicular adenopathy present.        Left: No supraclavicular adenopathy present.   Neurological: He is alert and oriented to person, place, and time. He has normal strength.   Skin: Skin is warm and dry. No cyanosis or erythema. No pallor.   No venous staining   Psychiatric: He has a normal mood and affect. His speech is normal. Judgment and thought content normal.     Results for YESSY WATTS (MRN [id number]) as of 4/4/2017 19:03   Ref. Range 1/11/2017 16:09   Creatinine Latest Ref Range: 0.7 - 1.3 mg/dl 1.0   BUN Latest Ref Range: 7 - 21 mg/dl 13     ASSESSMENT:  Yessy was seen today for abnormal mri.    Diagnoses and all orders for this visit:    Stenosis of left carotid artery  -     Duplex Carotid Ultrasound CAR; Future    Essential hypertension    Malignant essential hypertension    Spondylosis of cervical region without myelopathy or radiculopathy    Osteoarthritis of lumbar spine, unspecified spinal osteoarthritis complication status    Spinal stenosis of lumbar region    PLAN:  Detailed discussion with Mr Watts regarding situation and options.  Carotid stenosis, CAD.  nonlateralizing symptoms.  Multiple risk factors with severe comorbidities.  No intervention indicated at this  time.  Will follow with interval imaging.  Risks, benefits discussed.  Understands and wishes to proceed with plan.    Return in 1 year with Carotid Duplex    Recommended regular physical activity, progressive walking program.  Continue current medications as directed.  Will Obtain relevant old records.    Thank you for the opportunity to participate in this patient's care.    Copy to primary care provider.    EMR Dragon/Transcription disclaimer:   Much of this encounter note is an electronic transcription/translation of spoken language to printed text. The electronic translation of spoken language may permit erroneous, or at times, nonsensical words or phrases to be inadvertently transcribed; Although I have reviewed the note for such errors, some may still exist.

## 2017-04-27 RX ORDER — FLUTICASONE PROPIONATE 50 MCG
SPRAY, SUSPENSION (ML) NASAL
Qty: 16 G | Refills: 2 | Status: SHIPPED | OUTPATIENT
Start: 2017-04-27 | End: 2017-07-12 | Stop reason: SDUPTHER

## 2017-05-01 RX ORDER — GABAPENTIN 600 MG/1
TABLET ORAL
Qty: 90 TABLET | Refills: 2 | Status: SHIPPED | OUTPATIENT
Start: 2017-05-01 | End: 2017-07-10 | Stop reason: SDUPTHER

## 2017-05-10 ENCOUNTER — APPOINTMENT (OUTPATIENT)
Dept: LAB | Facility: HOSPITAL | Age: 52
End: 2017-05-10

## 2017-05-10 ENCOUNTER — OFFICE VISIT (OUTPATIENT)
Dept: FAMILY MEDICINE CLINIC | Facility: CLINIC | Age: 52
End: 2017-05-10

## 2017-05-10 VITALS
HEART RATE: 69 BPM | HEIGHT: 70 IN | WEIGHT: 200 LBS | SYSTOLIC BLOOD PRESSURE: 150 MMHG | BODY MASS INDEX: 28.63 KG/M2 | DIASTOLIC BLOOD PRESSURE: 82 MMHG

## 2017-05-10 DIAGNOSIS — M47.816 OSTEOARTHRITIS OF LUMBAR SPINE, UNSPECIFIED SPINAL OSTEOARTHRITIS COMPLICATION STATUS: ICD-10-CM

## 2017-05-10 DIAGNOSIS — I10 ESSENTIAL HYPERTENSION: Primary | ICD-10-CM

## 2017-05-10 DIAGNOSIS — Z12.5 PROSTATE CANCER SCREENING: ICD-10-CM

## 2017-05-10 PROBLEM — G56.00 CARPAL TUNNEL SYNDROME: Status: ACTIVE | Noted: 2017-04-07

## 2017-05-10 LAB — PSA SERPL-MCNC: 0.66 NG/ML (ref 0–4)

## 2017-05-10 PROCEDURE — 84153 ASSAY OF PSA TOTAL: CPT | Performed by: FAMILY MEDICINE

## 2017-05-10 PROCEDURE — 36415 COLL VENOUS BLD VENIPUNCTURE: CPT | Performed by: FAMILY MEDICINE

## 2017-05-10 PROCEDURE — 99213 OFFICE O/P EST LOW 20 MIN: CPT | Performed by: FAMILY MEDICINE

## 2017-05-10 RX ORDER — PROMETHAZINE HYDROCHLORIDE 25 MG/1
25 TABLET ORAL EVERY 6 HOURS PRN
COMMUNITY
End: 2017-07-11 | Stop reason: SDUPTHER

## 2017-05-10 RX ORDER — HYDROCODONE BITARTRATE AND ACETAMINOPHEN 7.5; 325 MG/1; MG/1
1 TABLET ORAL EVERY 6 HOURS PRN
COMMUNITY
End: 2017-09-11

## 2017-05-10 RX ORDER — TAMSULOSIN HYDROCHLORIDE 0.4 MG/1
1 CAPSULE ORAL NIGHTLY
Qty: 30 CAPSULE | Refills: 2 | Status: SHIPPED | OUTPATIENT
Start: 2017-05-10 | End: 2017-07-10 | Stop reason: SDUPTHER

## 2017-05-23 ENCOUNTER — OFFICE VISIT (OUTPATIENT)
Dept: PAIN MEDICINE | Facility: CLINIC | Age: 52
End: 2017-05-23

## 2017-05-23 VITALS
SYSTOLIC BLOOD PRESSURE: 140 MMHG | DIASTOLIC BLOOD PRESSURE: 72 MMHG | WEIGHT: 199.9 LBS | BODY MASS INDEX: 28.62 KG/M2 | HEIGHT: 70 IN

## 2017-05-23 DIAGNOSIS — Z79.899 HIGH RISK MEDICATIONS (NOT ANTICOAGULANTS) LONG-TERM USE: ICD-10-CM

## 2017-05-23 DIAGNOSIS — M54.16 LUMBAR RADICULOPATHY: ICD-10-CM

## 2017-05-23 DIAGNOSIS — M48.061 SPINAL STENOSIS OF LUMBAR REGION: Primary | ICD-10-CM

## 2017-05-23 DIAGNOSIS — M51.36 DDD (DEGENERATIVE DISC DISEASE), LUMBAR: ICD-10-CM

## 2017-05-23 PROCEDURE — 99213 OFFICE O/P EST LOW 20 MIN: CPT | Performed by: PAIN MEDICINE

## 2017-05-30 RX ORDER — BACLOFEN 20 MG/1
TABLET ORAL
Qty: 90 TABLET | Refills: 2 | Status: SHIPPED | OUTPATIENT
Start: 2017-05-30 | End: 2017-09-21 | Stop reason: SDUPTHER

## 2017-06-05 DIAGNOSIS — I10 ESSENTIAL HYPERTENSION: ICD-10-CM

## 2017-06-05 RX ORDER — METOPROLOL TARTRATE 100 MG/1
TABLET ORAL
Qty: 60 TABLET | Refills: 2 | Status: SHIPPED | OUTPATIENT
Start: 2017-06-05 | End: 2017-08-28 | Stop reason: SDUPTHER

## 2017-06-05 RX ORDER — AMLODIPINE BESYLATE 10 MG/1
TABLET ORAL
Qty: 30 TABLET | Refills: 2 | Status: SHIPPED | OUTPATIENT
Start: 2017-06-05 | End: 2017-08-28 | Stop reason: SDUPTHER

## 2017-06-08 ENCOUNTER — HOSPITAL ENCOUNTER (OUTPATIENT)
Dept: INTERVENTIONAL RADIOLOGY/VASCULAR | Facility: HOSPITAL | Age: 52
Discharge: HOME | End: 2017-06-08
Attending: PAIN MEDICINE | Admitting: PAIN MEDICINE

## 2017-06-08 VITALS
DIASTOLIC BLOOD PRESSURE: 76 MMHG | SYSTOLIC BLOOD PRESSURE: 145 MMHG | OXYGEN SATURATION: 95 % | HEART RATE: 67 BPM | RESPIRATION RATE: 18 BRPM

## 2017-06-08 DIAGNOSIS — M54.16 LUMBAR RADICULOPATHY: ICD-10-CM

## 2017-06-08 PROCEDURE — 25010000002 METHYLPREDNISOLONE PER 80 MG: Performed by: PAIN MEDICINE

## 2017-06-08 PROCEDURE — 62323 NJX INTERLAMINAR LMBR/SAC: CPT | Performed by: PAIN MEDICINE

## 2017-06-08 PROCEDURE — 0 IOPAMIDOL 41 % SOLUTION: Performed by: PAIN MEDICINE

## 2017-06-08 RX ORDER — METHYLPREDNISOLONE ACETATE 80 MG/ML
INJECTION, SUSPENSION INTRA-ARTICULAR; INTRALESIONAL; INTRAMUSCULAR; SOFT TISSUE
Status: COMPLETED | OUTPATIENT
Start: 2017-06-08 | End: 2017-06-08

## 2017-06-08 RX ORDER — LIDOCAINE HYDROCHLORIDE 20 MG/ML
INJECTION, SOLUTION INFILTRATION; PERINEURAL
Status: COMPLETED | OUTPATIENT
Start: 2017-06-08 | End: 2017-06-08

## 2017-06-08 RX ADMIN — IOPAMIDOL 2 ML: 408 INJECTION, SOLUTION INTRATHECAL at 13:44

## 2017-06-08 RX ADMIN — LIDOCAINE HYDROCHLORIDE 2 ML: 20 INJECTION, SOLUTION INFILTRATION; PERINEURAL at 13:43

## 2017-06-08 RX ADMIN — METHYLPREDNISOLONE ACETATE 80 MG: 80 INJECTION, SUSPENSION INTRA-ARTICULAR; INTRALESIONAL; INTRAMUSCULAR; SOFT TISSUE at 13:43

## 2017-06-09 ENCOUNTER — OFFICE VISIT (OUTPATIENT)
Dept: FAMILY MEDICINE CLINIC | Facility: CLINIC | Age: 52
End: 2017-06-09

## 2017-06-09 VITALS
WEIGHT: 205 LBS | SYSTOLIC BLOOD PRESSURE: 136 MMHG | BODY MASS INDEX: 29.35 KG/M2 | DIASTOLIC BLOOD PRESSURE: 76 MMHG | HEIGHT: 70 IN

## 2017-06-09 DIAGNOSIS — I10 MALIGNANT ESSENTIAL HYPERTENSION: Primary | Chronic | ICD-10-CM

## 2017-06-09 DIAGNOSIS — M48.061 SPINAL STENOSIS OF LUMBAR REGION: ICD-10-CM

## 2017-06-09 PROCEDURE — 99213 OFFICE O/P EST LOW 20 MIN: CPT | Performed by: FAMILY MEDICINE

## 2017-06-09 RX ORDER — TRAMADOL HYDROCHLORIDE 50 MG/1
50 TABLET ORAL EVERY 6 HOURS PRN
Qty: 120 TABLET | Refills: 2 | Status: SHIPPED | OUTPATIENT
Start: 2017-06-09 | End: 2017-09-11 | Stop reason: SDUPTHER

## 2017-06-09 NOTE — PROGRESS NOTES
Subjective   Evangelist Gipson is a 51 y.o. male.     Hypertension   This is a chronic problem. The current episode started more than 1 year ago. The problem has been gradually improving since onset. The problem is controlled. Associated symptoms include anxiety, headaches and malaise/fatigue. Pertinent negatives include no blurred vision, chest pain, neck pain, orthopnea, palpitations, peripheral edema, PND, shortness of breath or sweats. There are no associated agents to hypertension. Risk factors for coronary artery disease include obesity and sedentary lifestyle. Past treatments include calcium channel blockers, diuretics and beta blockers. The current treatment provides moderate improvement. There are no compliance problems.  There is no history of angina, kidney disease, CAD/MI, CVA, heart failure, left ventricular hypertrophy, PVD or retinopathy.   His back pain isn't much better since he was here. He just had another injection and those typically help him.  Had been taking a couple weeks for him to notice any relief from them in the past.  If the injections don't help they may need to do surgery.  Has been taking tramadol for pain and that helps a little.        Current Outpatient Prescriptions:   •  albuterol (PROAIR RESPICLICK) 108 (90 BASE) MCG/ACT inhaler, Inhale 2 puffs every 4 (four) hours as needed for shortness of air., Disp: , Rfl:   •  amLODIPine (NORVASC) 10 MG tablet, TAKE 1 TABLET BY MOUTH DAILY., Disp: 30 tablet, Rfl: 2  •  baclofen (LIORESAL) 20 MG tablet, TAKE ONE TABLET BY MOUTH THREE TIMES A DAY, Disp: 90 tablet, Rfl: 2  •  cetirizine (zyrTEC) 10 MG tablet, TAKE ONE TABLET EACH DAY., Disp: 90 tablet, Rfl: 8  •  clonazePAM (KlonoPIN) 0.5 MG tablet, Take 0.5 mg by mouth 3 (three) times a day as needed., Disp: , Rfl:   •  cyclobenzaprine (FLEXERIL) 5 MG tablet, TAKE 1-2 TABLETS BY MOUTH TWO TIMES A DAY AS NEEDED., Disp: 120 tablet, Rfl: 2  •  EPINEPHrine (EPIPEN 2-YENNY) 0.3 MG/0.3ML solution  auto-injector injection, , Disp: , Rfl:   •  fluticasone (FLONASE) 50 MCG/ACT nasal spray, USE ONE SPRAY IN EACH NOSTRIL TWO TIMES A DAY. SHAKE GENTLY, Disp: 16 g, Rfl: 2  •  gabapentin (NEURONTIN) 600 MG tablet, TAKE ONE TABLET BY MOUTH THREE TIMES A DAY, Disp: 90 tablet, Rfl: 2  •  hydrochlorothiazide (HYDRODIURIL) 25 MG tablet, TAKE 1 TABLET BY MOUTH DAILY., Disp: 30 tablet, Rfl: 3  •  HYDROcodone-acetaminophen (NORCO) 7.5-325 MG per tablet, Take 1 tablet by mouth Every 6 (Six) Hours As Needed for Moderate Pain (4-6)., Disp: , Rfl:   •  lidocaine (XYLOCAINE) 5 % ointment, , Disp: , Rfl:   •  metoprolol tartrate (LOPRESSOR) 100 MG tablet, Take 1 tablet by mouth 2 (Two) Times a Day., Disp: 60 tablet, Rfl: 2  •  metoprolol tartrate (LOPRESSOR) 100 MG tablet, TAKE 1 TABLET BY MOUTH 2 (TWO) TIMES A DAY., Disp: 60 tablet, Rfl: 2  •  metoprolol tartrate (LOPRESSOR) 25 MG tablet, Take 1 tablet by mouth 2 (Two) Times a Day., Disp: 60 tablet, Rfl: 2  •  montelukast (SINGULAIR) 10 MG tablet, TAKE ONE TABLET BY MOUTH AT BEDTIME, Disp: 90 tablet, Rfl: 2  •  pravastatin (PRAVACHOL) 20 MG tablet, Take 1 tablet by mouth Daily., Disp: 90 tablet, Rfl: 2  •  promethazine (PHENERGAN) 25 MG tablet, Take 25 mg by mouth Every 6 (Six) Hours As Needed for Nausea or Vomiting., Disp: , Rfl:   •  tamsulosin (FLOMAX) 0.4 MG capsule 24 hr capsule, Take 1 capsule by mouth Every Night., Disp: 30 capsule, Rfl: 2  •  traMADol (ULTRAM) 50 MG tablet, Take 1 tablet by mouth Every 6 (Six) Hours As Needed for moderate pain (4-6)., Disp: 120 tablet, Rfl: 2    The following portions of the patient's history were reviewed and updated as appropriate: allergies, current medications, past family history, past medical history, past social history, past surgical history and problem list.    Review of Systems   Constitutional: Positive for fatigue and malaise/fatigue. Negative for activity change, appetite change and unexpected weight change.   Eyes: Negative  "for blurred vision.   Respiratory: Negative for chest tightness and shortness of breath.    Cardiovascular: Negative for chest pain, palpitations, orthopnea, leg swelling and PND.   Musculoskeletal: Positive for arthralgias, back pain, gait problem and myalgias. Negative for neck pain.   Skin: Negative for pallor, rash and wound.   Neurological: Positive for weakness, numbness and headaches.   Psychiatric/Behavioral: Negative for decreased concentration, dysphoric mood, self-injury, sleep disturbance and suicidal ideas. The patient is not nervous/anxious.        Objective    Vitals:    06/09/17 1526   BP: 136/76   Weight: 205 lb (93 kg)   Height: 70\" (177.8 cm)       Physical Exam   Constitutional: He is oriented to person, place, and time. He appears well-developed and well-nourished. No distress.   Cardiovascular: Normal rate, regular rhythm and normal heart sounds.    No murmur heard.  No LE edema.   Pulmonary/Chest: Effort normal and breath sounds normal. No respiratory distress.   Abdominal: Soft. Bowel sounds are normal. He exhibits no distension. There is no tenderness.   Neurological: He is alert and oriented to person, place, and time.   Psychiatric: He has a normal mood and affect. His behavior is normal. Judgment and thought content normal.   Nursing note and vitals reviewed.      Assessment/Plan   Problems Addressed this Visit        Cardiovascular and Mediastinum    Malignant essential hypertension - Primary       Other    Spinal stenosis of lumbar region    Relevant Medications    traMADol (ULTRAM) 50 MG tablet        1.) HTN-  BP looks better, but when pain is severe it seems to be higher.  Will continue current medications and continue to monitor at home.    2.) Back Pain- Continue with injections.  Refilled tramadol.  RTC in 1-3 months or sooner PRN           "

## 2017-06-27 DIAGNOSIS — I10 ESSENTIAL HYPERTENSION: ICD-10-CM

## 2017-06-27 RX ORDER — HYDROCHLOROTHIAZIDE 25 MG/1
TABLET ORAL
Qty: 30 TABLET | Refills: 3 | Status: SHIPPED | OUTPATIENT
Start: 2017-06-27 | End: 2017-10-23 | Stop reason: SDUPTHER

## 2017-07-10 ENCOUNTER — OFFICE VISIT (OUTPATIENT)
Dept: FAMILY MEDICINE CLINIC | Facility: CLINIC | Age: 52
End: 2017-07-10

## 2017-07-10 VITALS
WEIGHT: 201 LBS | HEIGHT: 70 IN | SYSTOLIC BLOOD PRESSURE: 120 MMHG | DIASTOLIC BLOOD PRESSURE: 80 MMHG | BODY MASS INDEX: 28.77 KG/M2

## 2017-07-10 DIAGNOSIS — M48.061 SPINAL STENOSIS OF LUMBAR REGION: Primary | ICD-10-CM

## 2017-07-10 DIAGNOSIS — I10 ESSENTIAL HYPERTENSION: ICD-10-CM

## 2017-07-10 PROCEDURE — 99213 OFFICE O/P EST LOW 20 MIN: CPT | Performed by: FAMILY MEDICINE

## 2017-07-10 RX ORDER — GABAPENTIN 600 MG/1
600 TABLET ORAL 3 TIMES DAILY
Qty: 90 TABLET | Refills: 2 | Status: SHIPPED | OUTPATIENT
Start: 2017-07-10 | End: 2017-09-11 | Stop reason: SDUPTHER

## 2017-07-10 RX ORDER — TAMSULOSIN HYDROCHLORIDE 0.4 MG/1
1 CAPSULE ORAL NIGHTLY
Qty: 30 CAPSULE | Refills: 2 | Status: SHIPPED | OUTPATIENT
Start: 2017-07-10

## 2017-07-10 NOTE — PROGRESS NOTES
Subjective   Evangelist Gipson is a 51 y.o. male.     Back Pain   This is a chronic problem. The current episode started more than 1 year ago. The problem occurs daily. The problem is unchanged. The pain is present in the lumbar spine (Right hip). The quality of the pain is described as stabbing. The pain radiates to the right thigh. The pain is at a severity of 6/10. The pain is moderate. The pain is the same all the time. Associated symptoms include leg pain, numbness, tingling and weakness. Pertinent negatives include no abdominal pain, bladder incontinence, bowel incontinence, chest pain, dysuria, fever, headaches, paresis, paresthesias, pelvic pain, perianal numbness or weight loss. Risk factors include sedentary lifestyle, obesity and poor posture. He has tried analgesics for the symptoms. The treatment provided moderate relief.   Hypertension   This is a chronic problem. The current episode started more than 1 year ago. The problem is unchanged. The problem is controlled. Pertinent negatives include no chest pain, headaches, palpitations or shortness of breath. There are no associated agents to hypertension. Past treatments include calcium channel blockers, beta blockers and central alpha agonists. The current treatment provides moderate improvement. There are no compliance problems.  There is no history of angina, kidney disease, CAD/MI, CVA, heart failure, left ventricular hypertrophy, PVD or retinopathy.        Current Outpatient Prescriptions:   •  albuterol (PROAIR RESPICLICK) 108 (90 BASE) MCG/ACT inhaler, Inhale 2 puffs every 4 (four) hours as needed for shortness of air., Disp: , Rfl:   •  amLODIPine (NORVASC) 10 MG tablet, TAKE 1 TABLET BY MOUTH DAILY., Disp: 30 tablet, Rfl: 2  •  baclofen (LIORESAL) 20 MG tablet, TAKE ONE TABLET BY MOUTH THREE TIMES A DAY, Disp: 90 tablet, Rfl: 2  •  cetirizine (zyrTEC) 10 MG tablet, TAKE ONE TABLET EACH DAY., Disp: 90 tablet, Rfl: 8  •  clonazePAM (KlonoPIN) 0.5 MG  tablet, Take 0.5 mg by mouth 3 (three) times a day as needed., Disp: , Rfl:   •  cyclobenzaprine (FLEXERIL) 5 MG tablet, TAKE 1-2 TABLETS BY MOUTH TWO TIMES A DAY AS NEEDED., Disp: 120 tablet, Rfl: 2  •  EPINEPHrine (EPIPEN 2-YENNY) 0.3 MG/0.3ML solution auto-injector injection, , Disp: , Rfl:   •  fluticasone (FLONASE) 50 MCG/ACT nasal spray, USE ONE SPRAY IN EACH NOSTRIL TWO TIMES A DAY. SHAKE GENTLY, Disp: 16 g, Rfl: 2  •  gabapentin (NEURONTIN) 600 MG tablet, TAKE ONE TABLET BY MOUTH THREE TIMES A DAY, Disp: 90 tablet, Rfl: 2  •  hydrochlorothiazide (HYDRODIURIL) 25 MG tablet, TAKE 1 TABLET BY MOUTH DAILY., Disp: 30 tablet, Rfl: 3  •  HYDROcodone-acetaminophen (NORCO) 7.5-325 MG per tablet, Take 1 tablet by mouth Every 6 (Six) Hours As Needed for Moderate Pain (4-6)., Disp: , Rfl:   •  lidocaine (XYLOCAINE) 5 % ointment, , Disp: , Rfl:   •  metoprolol tartrate (LOPRESSOR) 100 MG tablet, Take 1 tablet by mouth 2 (Two) Times a Day., Disp: 60 tablet, Rfl: 2  •  metoprolol tartrate (LOPRESSOR) 25 MG tablet, Take 1 tablet by mouth 2 (Two) Times a Day., Disp: 60 tablet, Rfl: 2  •  montelukast (SINGULAIR) 10 MG tablet, TAKE ONE TABLET BY MOUTH AT BEDTIME, Disp: 90 tablet, Rfl: 2  •  pravastatin (PRAVACHOL) 20 MG tablet, Take 1 tablet by mouth Daily., Disp: 90 tablet, Rfl: 2  •  promethazine (PHENERGAN) 25 MG tablet, Take 25 mg by mouth Every 6 (Six) Hours As Needed for Nausea or Vomiting., Disp: , Rfl:   •  tamsulosin (FLOMAX) 0.4 MG capsule 24 hr capsule, Take 1 capsule by mouth Every Night., Disp: 30 capsule, Rfl: 2  •  traMADol (ULTRAM) 50 MG tablet, Take 1 tablet by mouth Every 6 (Six) Hours As Needed for Moderate Pain (4-6)., Disp: 120 tablet, Rfl: 2  •  metoprolol tartrate (LOPRESSOR) 100 MG tablet, TAKE 1 TABLET BY MOUTH 2 (TWO) TIMES A DAY., Disp: 60 tablet, Rfl: 2    The following portions of the patient's history were reviewed and updated as appropriate: allergies, current medications, past family history, past  "medical history, past social history, past surgical history and problem list.    Review of Systems   Constitutional: Negative for activity change, appetite change, fatigue, fever, unexpected weight change and weight loss.   Eyes: Negative for visual disturbance.   Respiratory: Negative for cough, chest tightness, shortness of breath and wheezing.    Cardiovascular: Negative for chest pain, palpitations and leg swelling.   Gastrointestinal: Negative for abdominal distention, abdominal pain, bowel incontinence, constipation, diarrhea, nausea and vomiting.   Genitourinary: Negative for bladder incontinence, dysuria and pelvic pain.   Musculoskeletal: Positive for arthralgias and back pain. Negative for gait problem, joint swelling and myalgias.   Neurological: Positive for tingling, weakness and numbness. Negative for headaches and paresthesias.       Objective    Vitals:    07/10/17 1400   BP: 120/80   Weight: 201 lb (91.2 kg)   Height: 70\" (177.8 cm)       Physical Exam   Constitutional: He is oriented to person, place, and time. He appears well-developed and well-nourished. No distress.   Cardiovascular: Normal rate, regular rhythm and normal heart sounds.    No murmur heard.  No LE edema.   Pulmonary/Chest: Effort normal and breath sounds normal. No respiratory distress.   Abdominal: Soft. Bowel sounds are normal. He exhibits no distension. There is no tenderness.   Musculoskeletal:        Lumbar back: He exhibits pain and spasm. He exhibits normal range of motion.   Neurological: He is alert and oriented to person, place, and time.   Psychiatric: He has a normal mood and affect. His behavior is normal. Judgment and thought content normal.   Nursing note and vitals reviewed.      Assessment/Plan   Problems Addressed this Visit        Cardiovascular and Mediastinum    Hypertension       Other    Spinal stenosis of lumbar region - Primary      1.) Back Pain-  Continue injections and follow-up with neurosurgery in " [place].  Will continue tramadol for now.  Hoping that this will not be long-term medication.  Will discuss UDS at next appointment.  The patient has read and signed the [facility] Controlled Substance Contract.  I will continue to see patient for regular follow up appointments.  They are well controlled on their medication.  JUDITH has been reviewed by me and is updated every 3 months. The patient is aware of the potential for addiction and dependence.  Also refilled gabapentin. Discussed with him that has become a controlled medication.  2.) HTN- Appears to be doing well. Continue current medications.  RTC in 2 months or sooner PRN

## 2017-07-10 NOTE — PATIENT INSTRUCTIONS
Trochanteric Bursitis  You have hip pain due to trochanteric bursitis. Bursitis means that the sack near the outside of the hip is filled with fluid and inflamed. This sack is made up of protective soft tissue. The pain from trochanteric bursitis can be severe and keep you from sleep. It can radiate to the buttocks or down the outside of the thigh to the knee. The pain is almost always worse when rising from the seated or lying position and with walking. Pain can improve after you take a few steps. It happens more often in people with hip joint and lumbar spine problems, such as arthritis or previous surgery. Very rarely the trochanteric bursa can become infected, and antibiotics and/or surgery may be needed.  Treatment often includes an injection of local anesthetic mixed with cortisone medicine. This medicine is injected into the area where it is most tender over the hip. Repeat injections may be necessary if the response to treatment is slow. You can apply ice packs over the tender area for 30 minutes every 2 hours for the next few days. Anti-inflammatory and/or narcotic pain medicine may also be helpful. Limit your activity for the next few days if the pain continues. See your caregiver in 5-10 days if you are not greatly improved.   SEEK IMMEDIATE MEDICAL CARE IF:  · You develop severe pain, fever, or increased redness.  · You have pain that radiates below the knee.  EXERCISES  STRETCHING EXERCISES - Trochanteric Bursitis   These exercises may help you when beginning to rehabilitate your injury. Your symptoms may resolve with or without further involvement from your physician, physical therapist, or . While completing these exercises, remember:   · Restoring tissue flexibility helps normal motion to return to the joints. This allows healthier, less painful movement and activity.  · An effective stretch should be held for at least 30 seconds.  · A stretch should never be painful. You should only  feel a gentle lengthening or release in the stretched tissue.  STRETCH - Iliotibial Band  · On the floor or bed, lie on your side so your injured leg is on top. Bend your knee and grab your ankle.  · Slowly bring your knee back so that your thigh is in line with your trunk. Keep your heel at your buttocks and gently arch your back so your head, shoulders and hips line up.  · Slowly lower your leg so that your knee approaches the floor/bed until you feel a gentle stretch on the outside of your thigh. If you do not feel a stretch and your knee will not fall farther, place the heel of your opposite foot on top of your knee and pull your thigh down farther.  · Hold this stretch for __________ seconds.  · Repeat __________ times. Complete this exercise __________ times per day.  STRETCH - Hamstrings, Supine   · Lie on your back. Loop a belt or towel over the ball of your foot as shown.  · Straighten your knee and slowly pull on the belt to raise your injured leg. Do not allow the knee to bend. Keep your opposite leg flat on the floor.  · Raise the leg until you feel a gentle stretch behind your knee or thigh. Hold this position for __________ seconds.  · Repeat __________ times. Complete this stretch __________ times per day.  STRETCH - Quadriceps, Prone   · Lie on your stomach on a firm surface, such as a bed or padded floor.  · Bend your knee and grasp your ankle. If you are unable to reach your ankle or pant leg, use a belt around your foot to lengthen your reach.  · Gently pull your heel toward your buttocks. Your knee should not slide out to the side. You should feel a stretch in the front of your thigh and/or knee.  · Hold this position for __________ seconds.  · Repeat __________ times. Complete this stretch __________ times per day.  STRETCHING - Hip Flexors, Lunge  Half kneel with your knee on the floor and your opposite knee bent and directly over your ankle.  · Keep good posture with your head over your  shoulders. Tighten your buttocks to point your tailbone downward; this will prevent your back from arching too much.  · You should feel a gentle stretch in the front of your thigh and/or hip. If you do not feel any resistance, slightly slide your opposite foot forward and then slowly lunge forward so your knee once again lines up over your ankle. Be sure your tailbone remains pointed downward.  · Hold this stretch for __________ seconds.  · Repeat __________ times. Complete this stretch __________ times per day.  STRETCH - Adductors, Lunge  · While standing, spread your legs.  · Lean away from your injured leg by bending your opposite knee. You may rest your hands on your thigh for balance.  · You should feel a stretch in your inner thigh. Hold for __________ seconds.  · Repeat __________ times. Complete this exercise __________ times per day.     This information is not intended to replace advice given to you by your health care provider. Make sure you discuss any questions you have with your health care provider.     Document Released: 01/25/2006 Document Revised: 05/03/2016 Document Reviewed: 12/02/2016  [employer] Interactive Patient Education ©2017 [employer]

## 2017-07-11 RX ORDER — PROMETHAZINE HYDROCHLORIDE 25 MG/1
25 TABLET ORAL EVERY 6 HOURS PRN
Qty: 30 TABLET | Refills: 2 | Status: SHIPPED | OUTPATIENT
Start: 2017-07-11 | End: 2017-09-21 | Stop reason: SDUPTHER

## 2017-07-12 RX ORDER — FLUTICASONE PROPIONATE 50 MCG
SPRAY, SUSPENSION (ML) NASAL
Qty: 16 G | Refills: 2 | Status: SHIPPED | OUTPATIENT
Start: 2017-07-12 | End: 2017-09-21 | Stop reason: SDUPTHER

## 2017-07-25 DIAGNOSIS — E78.5 HYPERLIPIDEMIA, UNSPECIFIED HYPERLIPIDEMIA TYPE: ICD-10-CM

## 2017-07-25 RX ORDER — PRAVASTATIN SODIUM 20 MG
20 TABLET ORAL DAILY
Qty: 90 TABLET | Refills: 2 | Status: SHIPPED | OUTPATIENT
Start: 2017-07-25 | End: 2018-04-06 | Stop reason: SDUPTHER

## 2017-07-25 RX ORDER — PRAVASTATIN SODIUM 20 MG
TABLET ORAL
Qty: 90 TABLET | Refills: 2 | OUTPATIENT
Start: 2017-07-25

## 2017-07-25 RX ORDER — GABAPENTIN 600 MG/1
TABLET ORAL
Qty: 90 TABLET | Refills: 2 | OUTPATIENT
Start: 2017-07-25

## 2017-08-28 DIAGNOSIS — I10 ESSENTIAL HYPERTENSION: ICD-10-CM

## 2017-08-28 RX ORDER — AMLODIPINE BESYLATE 10 MG/1
TABLET ORAL
Qty: 30 TABLET | Refills: 2 | Status: SHIPPED | OUTPATIENT
Start: 2017-08-28 | End: 2018-02-20 | Stop reason: SDUPTHER

## 2017-08-28 RX ORDER — METOPROLOL TARTRATE 100 MG/1
TABLET ORAL
Qty: 60 TABLET | Refills: 2 | Status: SHIPPED | OUTPATIENT
Start: 2017-08-28 | End: 2018-02-20 | Stop reason: SDUPTHER

## 2017-09-11 ENCOUNTER — OFFICE VISIT (OUTPATIENT)
Dept: FAMILY MEDICINE CLINIC | Facility: CLINIC | Age: 52
End: 2017-09-11

## 2017-09-11 VITALS
WEIGHT: 209 LBS | HEIGHT: 70 IN | SYSTOLIC BLOOD PRESSURE: 150 MMHG | HEART RATE: 60 BPM | OXYGEN SATURATION: 98 % | BODY MASS INDEX: 29.92 KG/M2 | DIASTOLIC BLOOD PRESSURE: 85 MMHG

## 2017-09-11 DIAGNOSIS — I10 MALIGNANT ESSENTIAL HYPERTENSION: Primary | ICD-10-CM

## 2017-09-11 DIAGNOSIS — Z79.899 HIGH RISK MEDICATION USE: ICD-10-CM

## 2017-09-11 DIAGNOSIS — L98.9 SKIN LESION OF BACK: ICD-10-CM

## 2017-09-11 DIAGNOSIS — M71.9 BURSITIS: ICD-10-CM

## 2017-09-11 DIAGNOSIS — M48.061 SPINAL STENOSIS OF LUMBAR REGION: ICD-10-CM

## 2017-09-11 PROCEDURE — 17110 DESTRUCTION B9 LES UP TO 14: CPT | Performed by: FAMILY MEDICINE

## 2017-09-11 PROCEDURE — 20551 NJX 1 TENDON ORIGIN/INSJ: CPT | Performed by: FAMILY MEDICINE

## 2017-09-11 PROCEDURE — 99213 OFFICE O/P EST LOW 20 MIN: CPT | Performed by: FAMILY MEDICINE

## 2017-09-11 RX ORDER — GABAPENTIN 600 MG/1
600 TABLET ORAL 3 TIMES DAILY
Qty: 90 TABLET | Refills: 2 | Status: SHIPPED | OUTPATIENT
Start: 2017-09-11 | End: 2017-09-11 | Stop reason: SDUPTHER

## 2017-09-11 RX ORDER — GABAPENTIN 600 MG/1
600 TABLET ORAL 3 TIMES DAILY
Qty: 90 TABLET | Refills: 2 | Status: SHIPPED | OUTPATIENT
Start: 2017-09-11 | End: 2017-11-13 | Stop reason: SDUPTHER

## 2017-09-11 RX ORDER — TRAMADOL HYDROCHLORIDE 50 MG/1
50 TABLET ORAL EVERY 6 HOURS PRN
Qty: 120 TABLET | Refills: 2 | Status: SHIPPED | OUTPATIENT
Start: 2017-09-11 | End: 2017-09-11 | Stop reason: SDUPTHER

## 2017-09-11 RX ORDER — TRAMADOL HYDROCHLORIDE 50 MG/1
50 TABLET ORAL EVERY 6 HOURS PRN
Qty: 120 TABLET | Refills: 2 | Status: SHIPPED | OUTPATIENT
Start: 2017-09-11 | End: 2017-11-13 | Stop reason: SDUPTHER

## 2017-09-11 RX ORDER — TRIAMCINOLONE ACETONIDE 40 MG/ML
80 INJECTION, SUSPENSION INTRA-ARTICULAR; INTRAMUSCULAR ONCE
Status: DISCONTINUED | OUTPATIENT
Start: 2017-09-11 | End: 2017-11-13

## 2017-09-11 NOTE — PROGRESS NOTES
Pedro Gipson is a 52 y.o. male.     Hypertension   This is a chronic problem. The current episode started more than 1 year ago. The problem has been waxing and waning since onset. The problem is uncontrolled. Pertinent negatives include no anxiety, blurred vision, chest pain, headaches, malaise/fatigue, orthopnea, palpitations, peripheral edema, PND, shortness of breath or sweats. There are no associated agents to hypertension. Risk factors for coronary artery disease include dyslipidemia, obesity, stress and male gender. Past treatments include beta blockers, calcium channel blockers and diuretics. The current treatment provides moderate improvement. There are no compliance problems.  There is no history of angina, kidney disease, CAD/MI, CVA, heart failure, left ventricular hypertrophy, PVD or retinopathy.   Back Pain   This is a chronic problem. The current episode started more than 1 year ago. The problem occurs daily. The problem has been gradually worsening since onset. The pain is present in the lumbar spine and thoracic spine. The quality of the pain is described as aching. The pain radiates to the right thigh. The pain is at a severity of 8/10. The pain is moderate. The symptoms are aggravated by sitting and standing. Associated symptoms include leg pain, numbness, tingling and weakness. Pertinent negatives include no abdominal pain, bladder incontinence, bowel incontinence, chest pain, dysuria, fever, headaches, paresis, paresthesias, pelvic pain, perianal numbness or weight loss. Risk factors include lack of exercise, obesity, poor posture and sedentary lifestyle. He has tried analgesics, home exercises, NSAIDs and muscle relaxant for the symptoms.   He has been having issues with right hip pain for the last several months. He says that has gotten worse and has been worse because of the way he has to sleep with his BiPAP machine.    He has a mole on his back that has changed in size but  doesn't hurt. He has had a lot of sun exposure.  His wife saw a couple spots that she is concerned about and may need to be removed.      Current Outpatient Prescriptions:   •  albuterol (PROAIR RESPICLICK) 108 (90 BASE) MCG/ACT inhaler, Inhale 2 puffs every 4 (four) hours as needed for shortness of air., Disp: , Rfl:   •  amLODIPine (NORVASC) 10 MG tablet, TAKE 1 TABLET BY MOUTH DAILY., Disp: 30 tablet, Rfl: 2  •  baclofen (LIORESAL) 20 MG tablet, TAKE ONE TABLET BY MOUTH THREE TIMES A DAY, Disp: 90 tablet, Rfl: 2  •  cetirizine (zyrTEC) 10 MG tablet, TAKE ONE TABLET EACH DAY., Disp: 90 tablet, Rfl: 8  •  clonazePAM (KlonoPIN) 0.5 MG tablet, Take 0.5 mg by mouth 3 (three) times a day as needed., Disp: , Rfl:   •  cyclobenzaprine (FLEXERIL) 5 MG tablet, TAKE 1-2 TABLETS BY MOUTH TWO TIMES A DAY AS NEEDED., Disp: 120 tablet, Rfl: 2  •  EPINEPHrine (EPIPEN 2-YENNY) 0.3 MG/0.3ML solution auto-injector injection, , Disp: , Rfl:   •  fluticasone (FLONASE) 50 MCG/ACT nasal spray, USE ONE SPRAY IN EACH NOSTRIL TWO TIMES A DAY. SHAKE GENTLY, Disp: 16 g, Rfl: 2  •  gabapentin (NEURONTIN) 600 MG tablet, Take 1 tablet by mouth 3 (Three) Times a Day., Disp: 90 tablet, Rfl: 2  •  hydrochlorothiazide (HYDRODIURIL) 25 MG tablet, TAKE 1 TABLET BY MOUTH DAILY., Disp: 30 tablet, Rfl: 3  •  metoprolol tartrate (LOPRESSOR) 100 MG tablet, Take 1 tablet by mouth 2 (Two) Times a Day., Disp: 60 tablet, Rfl: 2  •  metoprolol tartrate (LOPRESSOR) 100 MG tablet, TAKE 1 TABLET BY MOUTH 2 (TWO) TIMES A DAY., Disp: 60 tablet, Rfl: 2  •  metoprolol tartrate (LOPRESSOR) 25 MG tablet, TAKE 1 TABLET BY MOUTH 2 (TWO) TIMES A DAY WITH 100MG FOR TOTAL OF 125MG, Disp: 60 tablet, Rfl: 2  •  montelukast (SINGULAIR) 10 MG tablet, TAKE ONE TABLET BY MOUTH AT BEDTIME, Disp: 90 tablet, Rfl: 2  •  pravastatin (PRAVACHOL) 20 MG tablet, Take 1 tablet by mouth Daily., Disp: 90 tablet, Rfl: 2  •  promethazine (PHENERGAN) 25 MG tablet, Take 1 tablet by mouth Every 6  "(Six) Hours As Needed for Nausea or Vomiting., Disp: 30 tablet, Rfl: 2  •  tamsulosin (FLOMAX) 0.4 MG capsule 24 hr capsule, Take 1 capsule by mouth Every Night., Disp: 30 capsule, Rfl: 2  •  traMADol (ULTRAM) 50 MG tablet, Take 1 tablet by mouth Every 6 (Six) Hours As Needed for Moderate Pain (4-6)., Disp: 120 tablet, Rfl: 2  •  HYDROcodone-acetaminophen (NORCO) 7.5-325 MG per tablet, Take 1 tablet by mouth Every 6 (Six) Hours As Needed for Moderate Pain (4-6)., Disp: , Rfl:   •  lidocaine (XYLOCAINE) 5 % ointment, , Disp: , Rfl:     The following portions of the patient's history were reviewed and updated as appropriate: allergies, current medications, past family history, past medical history, past social history, past surgical history and problem list.    Review of Systems   Constitutional: Negative for activity change, appetite change, fatigue, fever, malaise/fatigue, unexpected weight change and weight loss.   Eyes: Negative for blurred vision and visual disturbance.   Respiratory: Negative for cough, chest tightness, shortness of breath and wheezing.    Cardiovascular: Negative for chest pain, palpitations, orthopnea, leg swelling and PND.   Gastrointestinal: Negative for abdominal distention, abdominal pain, bowel incontinence, constipation, diarrhea, nausea and vomiting.   Genitourinary: Negative for bladder incontinence, dysuria and pelvic pain.   Musculoskeletal: Positive for arthralgias and back pain. Negative for gait problem, joint swelling and myalgias.   Skin: Positive for color change. Negative for pallor, rash and wound.   Neurological: Positive for tingling, weakness and numbness. Negative for headaches and paresthesias.       Objective    Vitals:    09/11/17 1125   BP: 150/85   BP Location: Left arm   Patient Position: Sitting   Cuff Size: Adult   Pulse: 60   SpO2: 98%   Weight: 209 lb (94.8 kg)   Height: 70\" (177.8 cm)       Physical Exam   Constitutional: He is oriented to person, place, and " time. He appears well-developed and well-nourished. No distress.   Cardiovascular: Normal rate, regular rhythm and normal heart sounds.    No murmur heard.  No LE edema.   Pulmonary/Chest: Effort normal and breath sounds normal. No respiratory distress.   Abdominal: Soft. Bowel sounds are normal. He exhibits no distension. There is no tenderness.   Musculoskeletal:        Right hip: He exhibits tenderness. He exhibits normal range of motion, normal strength and no deformity.        Lumbar back: He exhibits pain and spasm. He exhibits normal range of motion.   Neurological: He is alert and oriented to person, place, and time.   Skin:   Pigmented lesion on his midback on the left side that has irregular borders and has some darker spots on the inside.  Scaly lesion that is approx 2cm in diameter on upper left shoulder.   Psychiatric: He has a normal mood and affect. His behavior is normal. Judgment and thought content normal.   Nursing note and vitals reviewed.    PROCEDURES:  Cryotherapy:  Used liquid nitrogen to freeze1 lesions located left shoulder.  No complications or concerns.    Instructed on wound care follow up     RIGHT BURSA INJECTION:  Discussed risks and benefits associated with injection.  After verbal consent was obtained, area was prepped in sterile fashion.  Alcohol used to prep the right bursa.  Injected with 23 gauge needle from posterior lateral approach.  No complications or concernes.  Injected 2cc kenalog and 1cc lidocaine.      Assessment/Plan   Problems Addressed this Visit        Cardiovascular and Mediastinum    Malignant essential hypertension - Primary    Relevant Orders    Comprehensive Metabolic Panel       Other    Spinal stenosis of lumbar region    Relevant Medications    traMADol (ULTRAM) 50 MG tablet      Other Visit Diagnoses     Skin lesion of back        Bursitis        Relevant Medications    triamcinolone acetonide (KENALOG-40) injection 80 mg    High risk medication use         Relevant Orders    Hemoglobin A1c    Lipid Panel    CBC (No Diff)        1.) HTN-  WIll continue current medications. Will check CMP today.  I think that it is higher because of pain. Had been doing well on medications.  2.) Spinal Stenosis/Back Pain- Discussed that he should try injections again and If no improvement call neurosurgery in [place] to discuss further options. Will continue tramadol for now. Also refilled gabapentin.  UDS at next appointmetn. Discussed today. He has been weaning off THC. The patient has read and signed the [facility] Controlled Substance Contract.  I will continue to see patient for regular follow up appointments.  They are well controlled on their medication.  JUDITH has been reviewed by me and is updated every 3 months. The patient is aware of the potential for addiction and dependence.  3.) Bursitis-  Injected right hip today and will see if that helps.  4.) Skin lesion- Used cryo on the scaly lesion and will remove the pigmented skin lesion at his next appointment.  RTC in 2-3 months or sooner PRN

## 2017-09-21 DIAGNOSIS — I10 ESSENTIAL HYPERTENSION: ICD-10-CM

## 2017-09-21 RX ORDER — METOPROLOL TARTRATE 100 MG/1
TABLET ORAL
Qty: 60 TABLET | Refills: 2 | Status: SHIPPED | OUTPATIENT
Start: 2017-09-21 | End: 2017-11-13 | Stop reason: SDUPTHER

## 2017-09-21 RX ORDER — TAMSULOSIN HYDROCHLORIDE 0.4 MG/1
CAPSULE ORAL
Qty: 30 CAPSULE | Refills: 2 | Status: SHIPPED | OUTPATIENT
Start: 2017-09-21 | End: 2017-11-13 | Stop reason: SDUPTHER

## 2017-09-21 RX ORDER — PROMETHAZINE HYDROCHLORIDE 25 MG/1
TABLET ORAL
Qty: 30 TABLET | Refills: 2 | Status: SHIPPED | OUTPATIENT
Start: 2017-09-21 | End: 2018-01-02 | Stop reason: SDUPTHER

## 2017-09-21 RX ORDER — BACLOFEN 20 MG/1
TABLET ORAL
Qty: 90 TABLET | Refills: 2 | Status: SHIPPED | OUTPATIENT
Start: 2017-09-21 | End: 2018-01-02 | Stop reason: SDUPTHER

## 2017-09-21 RX ORDER — CYCLOBENZAPRINE HCL 5 MG
TABLET ORAL
Qty: 120 TABLET | Refills: 2 | Status: SHIPPED | OUTPATIENT
Start: 2017-09-21 | End: 2018-04-11 | Stop reason: SDUPTHER

## 2017-09-21 RX ORDER — FLUTICASONE PROPIONATE 50 MCG
SPRAY, SUSPENSION (ML) NASAL
Qty: 16 G | Refills: 2 | Status: SHIPPED | OUTPATIENT
Start: 2017-09-21 | End: 2018-03-29

## 2017-09-21 RX ORDER — AMLODIPINE BESYLATE 10 MG/1
TABLET ORAL
Qty: 30 TABLET | Refills: 2 | Status: SHIPPED | OUTPATIENT
Start: 2017-09-21 | End: 2017-11-13 | Stop reason: SDUPTHER

## 2017-10-23 DIAGNOSIS — I10 ESSENTIAL HYPERTENSION: ICD-10-CM

## 2017-10-23 RX ORDER — HYDROCHLOROTHIAZIDE 25 MG/1
TABLET ORAL
Qty: 30 TABLET | Refills: 3 | Status: SHIPPED | OUTPATIENT
Start: 2017-10-23 | End: 2017-11-13

## 2017-11-13 ENCOUNTER — OFFICE VISIT (OUTPATIENT)
Dept: FAMILY MEDICINE CLINIC | Facility: CLINIC | Age: 52
End: 2017-11-13

## 2017-11-13 VITALS
WEIGHT: 206.5 LBS | HEIGHT: 70 IN | BODY MASS INDEX: 29.56 KG/M2 | DIASTOLIC BLOOD PRESSURE: 94 MMHG | SYSTOLIC BLOOD PRESSURE: 156 MMHG

## 2017-11-13 DIAGNOSIS — Z23 FLU VACCINE NEED: ICD-10-CM

## 2017-11-13 DIAGNOSIS — L81.9 PIGMENTED SKIN LESION: ICD-10-CM

## 2017-11-13 DIAGNOSIS — I10 ESSENTIAL HYPERTENSION: Primary | ICD-10-CM

## 2017-11-13 DIAGNOSIS — J30.89 CHRONIC NON-SEASONAL ALLERGIC RHINITIS, UNSPECIFIED TRIGGER: ICD-10-CM

## 2017-11-13 DIAGNOSIS — M48.062 SPINAL STENOSIS OF LUMBAR REGION WITH NEUROGENIC CLAUDICATION: ICD-10-CM

## 2017-11-13 PROCEDURE — 90686 IIV4 VACC NO PRSV 0.5 ML IM: CPT | Performed by: FAMILY MEDICINE

## 2017-11-13 PROCEDURE — 17110 DESTRUCTION B9 LES UP TO 14: CPT | Performed by: FAMILY MEDICINE

## 2017-11-13 PROCEDURE — 11100 PR BIOPSY OF SKIN LESION: CPT | Performed by: FAMILY MEDICINE

## 2017-11-13 PROCEDURE — 88305 TISSUE EXAM BY PATHOLOGIST: CPT | Performed by: FAMILY MEDICINE

## 2017-11-13 PROCEDURE — 88305 TISSUE EXAM BY PATHOLOGIST: CPT | Performed by: PATHOLOGY

## 2017-11-13 PROCEDURE — 90471 IMMUNIZATION ADMIN: CPT | Performed by: FAMILY MEDICINE

## 2017-11-13 PROCEDURE — 99214 OFFICE O/P EST MOD 30 MIN: CPT | Performed by: FAMILY MEDICINE

## 2017-11-13 RX ORDER — TRAMADOL HYDROCHLORIDE 50 MG/1
50 TABLET ORAL EVERY 6 HOURS PRN
Qty: 120 TABLET | Refills: 2 | Status: SHIPPED | OUTPATIENT
Start: 2017-11-13 | End: 2018-01-04 | Stop reason: SDUPTHER

## 2017-11-13 RX ORDER — GABAPENTIN 600 MG/1
600 TABLET ORAL 3 TIMES DAILY
Qty: 90 TABLET | Refills: 2 | Status: SHIPPED | OUTPATIENT
Start: 2017-11-13 | End: 2018-01-04 | Stop reason: SDUPTHER

## 2017-11-13 RX ORDER — CHLOROTHIAZIDE 500 MG
500 TABLET ORAL EVERY MORNING
Qty: 30 TABLET | Refills: 2 | Status: SHIPPED | OUTPATIENT
Start: 2017-11-13 | End: 2018-02-09 | Stop reason: SDUPTHER

## 2017-11-13 NOTE — PROGRESS NOTES
Subjective   Evangelist Gipson is a 52 y.o. male.     Hypertension   This is a chronic problem. The current episode started more than 1 year ago. The problem has been gradually worsening since onset. The problem is uncontrolled. Associated symptoms include anxiety, blurred vision, chest pain, headaches and malaise/fatigue. Pertinent negatives include no neck pain, orthopnea, palpitations, peripheral edema, PND, shortness of breath or sweats. Past treatments include calcium channel blockers, diuretics, beta blockers and central alpha agonists. The current treatment provides moderate improvement. There are no compliance problems.  Hypertensive end-organ damage includes CAD/MI.   Sinus Problem   This is a recurrent problem. The current episode started more than 1 year ago. The problem has been waxing and waning since onset. There has been no fever. The fever has been present for less than 1 day. His pain is at a severity of 0/10. The pain is mild. Associated symptoms include congestion, coughing, headaches, sinus pressure, sneezing and a sore throat. Pertinent negatives include no chills, diaphoresis, ear pain, hoarse voice, neck pain, shortness of breath or swollen glands. Treatments tried: singulair, flonase, and zyrtec. The treatment provided mild relief.   He has several skin lesions that are pigmented. He has not had skin cancers in the past.  Has had some changes to the lesions. They have increased in size and had some color changes also.   He has been on tramadol and gabapentin for his back pain. He has spinal stenosis. He would like to come off those medications because they are controlled.  He had been seeing a neurosurgeon in [place] and they were trying to avoid surgery if possible.        Current Outpatient Prescriptions:   •  albuterol (PROAIR RESPICLICK) 108 (90 BASE) MCG/ACT inhaler, Inhale 2 puffs every 4 (four) hours as needed for shortness of air., Disp: , Rfl:   •  amLODIPine (NORVASC) 10 MG tablet,  TAKE 1 TABLET BY MOUTH DAILY., Disp: 30 tablet, Rfl: 2  •  baclofen (LIORESAL) 20 MG tablet, TAKE ONE TABLET BY MOUTH THREE TIMES A DAY, Disp: 90 tablet, Rfl: 2  •  cetirizine (zyrTEC) 10 MG tablet, TAKE ONE TABLET EACH DAY., Disp: 90 tablet, Rfl: 8  •  clonazePAM (KlonoPIN) 0.5 MG tablet, Take 0.5 mg by mouth 3 (three) times a day as needed., Disp: , Rfl:   •  cyclobenzaprine (FLEXERIL) 5 MG tablet, TAKE 1-2 TABLETS BY MOUTH TWO TIMES A DAY AS NEEDED., Disp: 120 tablet, Rfl: 2  •  EPINEPHrine (EPIPEN 2-YENNY) 0.3 MG/0.3ML solution auto-injector injection, , Disp: , Rfl:   •  fluticasone (FLONASE) 50 MCG/ACT nasal spray, USE ONE SPRAY IN EACH NOSTRIL TWO TIMES A DAY. SHAKE GENTLY, Disp: 16 g, Rfl: 2  •  gabapentin (NEURONTIN) 600 MG tablet, Take 1 tablet by mouth 3 (Three) Times a Day., Disp: 90 tablet, Rfl: 2  •  hydrochlorothiazide (HYDRODIURIL) 25 MG tablet, TAKE 1 TABLET BY MOUTH DAILY., Disp: 30 tablet, Rfl: 3  •  lidocaine (XYLOCAINE) 5 % ointment, , Disp: , Rfl:   •  metoprolol tartrate (LOPRESSOR) 100 MG tablet, TAKE 1 TABLET BY MOUTH 2 (TWO) TIMES A DAY., Disp: 60 tablet, Rfl: 2  •  metoprolol tartrate (LOPRESSOR) 25 MG tablet, TAKE 1 TABLET BY MOUTH 2 (TWO) TIMES A DAY WITH 100MG FOR TOTAL OF 125MG, Disp: 60 tablet, Rfl: 2  •  montelukast (SINGULAIR) 10 MG tablet, TAKE ONE TABLET BY MOUTH AT BEDTIME, Disp: 90 tablet, Rfl: 2  •  pravastatin (PRAVACHOL) 20 MG tablet, Take 1 tablet by mouth Daily., Disp: 90 tablet, Rfl: 2  •  promethazine (PHENERGAN) 25 MG tablet, TAKE 1 TABLET BY MOUTH EVERY 6 (SIX) HOURS AS NEEDED FOR NAUSEA OR VOMITING., Disp: 30 tablet, Rfl: 2  •  tamsulosin (FLOMAX) 0.4 MG capsule 24 hr capsule, Take 1 capsule by mouth Every Night., Disp: 30 capsule, Rfl: 2  •  traMADol (ULTRAM) 50 MG tablet, Take 1 tablet by mouth Every 6 (Six) Hours As Needed for Moderate Pain ., Disp: 120 tablet, Rfl: 2  No current facility-administered medications for this visit.     The following portions of the  "patient's history were reviewed and updated as appropriate: allergies, current medications, past family history, past medical history, past social history, past surgical history and problem list.    Review of Systems   Constitutional: Positive for appetite change, fatigue and malaise/fatigue. Negative for activity change, chills, diaphoresis, fever and unexpected weight change.   HENT: Positive for congestion, sinus pressure, sneezing and sore throat. Negative for ear pain and hoarse voice.    Eyes: Positive for blurred vision. Negative for visual disturbance.   Respiratory: Positive for cough. Negative for chest tightness, shortness of breath and wheezing.    Cardiovascular: Positive for chest pain. Negative for palpitations, orthopnea, leg swelling and PND.   Gastrointestinal: Negative for abdominal distention, abdominal pain, constipation, diarrhea, nausea and vomiting.   Genitourinary: Negative for dysuria.   Musculoskeletal: Positive for arthralgias and back pain. Negative for gait problem, joint swelling, myalgias and neck pain.   Skin: Positive for color change. Negative for pallor, rash and wound.   Neurological: Positive for weakness, numbness and headaches.       Objective    Vitals:    11/13/17 1437   BP: 156/94   Weight: 206 lb 8 oz (93.7 kg)   Height: 70\" (177.8 cm)       Physical Exam   Constitutional: He is oriented to person, place, and time. He appears well-developed and well-nourished. No distress.   Cardiovascular: Normal rate, regular rhythm and normal heart sounds.    No murmur heard.  No LE edema.   Pulmonary/Chest: Effort normal and breath sounds normal. No respiratory distress.   Abdominal: Soft. Bowel sounds are normal. He exhibits no distension. There is no tenderness.   Musculoskeletal:        Right hip: He exhibits tenderness. He exhibits normal range of motion, normal strength and no deformity.        Lumbar back: He exhibits pain and spasm. He exhibits normal range of motion. "   Neurological: He is alert and oriented to person, place, and time.   Skin:   Pigmented lesion on his midback on the left side that has irregular borders and has some darker spots on the inside.  Several light lesions on chest.  Irregular borders.   Psychiatric: He has a normal mood and affect. His behavior is normal. Judgment and thought content normal.   Nursing note and vitals reviewed.      PROCEDURE/ Biopsy:  Discussed risks and procedures.  Reviewed different skin lesions of concern. Discussed planned removal and then consent was signed and will be filed into the chart.    First, used cryotherapy to freeze 2 lesions on the front of his chest.  Then covered lesions with steri-strips.  Then prepped and cleaned lesion on his back with betadine. Then numbed with 1cc lidocaine with epi.  Used 6mm punch biopsy to remove the lesion.  Specimen sent to the lab.  Used cautery to achieve hemostasis and then placed two sutures with 4.0 Vycril suture.  Cleaned area and covered with steri-strips.      Assessment/Plan   Problems Addressed this Visit        Cardiovascular and Mediastinum    Hypertension - Primary    Relevant Medications    chlorothiazide (DIURIL) 500 MG tablet       Other    Spinal stenosis of lumbar region    Relevant Medications    traMADol (ULTRAM) 50 MG tablet      Other Visit Diagnoses     Pigmented skin lesion        Relevant Orders    Tissue Pathology Exam - Tissue, Back, Upper    Chronic non-seasonal allergic rhinitis, unspecified trigger        Flu vaccine need        Relevant Orders    Flu Vaccine Quad PF 3YR+ (FLUARIX/FLUZONE [id number]) (Completed)        1.) HTN-  Has been running high. Will change HCTZ to chlorothiazide and see if that helps more.  Will recheck labs when he comes back in. Continue other medications.  2.) Spinal Stenosis- Will continue tramadol and gabapentin. UDS at next visit. The patient has read and signed the [facility] Controlled Substance Contract.  I will continue to  see patient for regular follow up appointments.  They are well controlled on their medication.  JUDITH has been reviewed by me and is updated every 3 months. The patient is aware of the potential for addiction and dependence.  3.) Pigmented skin lesion-  Removed concerning lesion on back without complication. Froze off benign appearing lesions.  4.) Allergic rhinitis- Continue current medications. May be part of the reason that his BP has been elevated.  Will continue to monitor.  5.) Flu vaccine given today.  RTC in 2-3 months or sooner PRN

## 2017-11-15 DIAGNOSIS — M54.16 LUMBAR RADICULOPATHY: Primary | ICD-10-CM

## 2017-11-17 ENCOUNTER — TELEPHONE (OUTPATIENT)
Dept: FAMILY MEDICINE CLINIC | Facility: CLINIC | Age: 52
End: 2017-11-17

## 2017-11-17 LAB
LAB AP CASE REPORT: NORMAL
LAB AP INTRADEPARTMENTAL CONSULT: NORMAL
Lab: NORMAL
PATH REPORT.FINAL DX SPEC: NORMAL
PATH REPORT.GROSS SPEC: NORMAL

## 2017-11-17 NOTE — TELEPHONE ENCOUNTER
Pr Dr. Watts, Mr. Gipson has been called with his recent BX results & recommendations.  Continue his current medications and follow-up as planned or sooner if any problems.      ----- Message from Ashley Watts MD sent at 11/17/2017 11:33 AM CST -----  Please let him know that the bx was a benign mole.

## 2017-11-17 NOTE — PROGRESS NOTES
Pr Dr. Watts, Mr. Gipson has been called with his recent BX results & recommendations.  Continue his current medications and follow-up as planned or sooner if any problems.

## 2017-12-04 ENCOUNTER — HOSPITAL ENCOUNTER (OUTPATIENT)
Dept: INTERVENTIONAL RADIOLOGY/VASCULAR | Facility: HOSPITAL | Age: 52
Discharge: HOME | End: 2017-12-04
Attending: PAIN MEDICINE | Admitting: PAIN MEDICINE

## 2017-12-04 VITALS
OXYGEN SATURATION: 97 % | RESPIRATION RATE: 18 BRPM | DIASTOLIC BLOOD PRESSURE: 88 MMHG | SYSTOLIC BLOOD PRESSURE: 178 MMHG | HEART RATE: 70 BPM

## 2017-12-04 DIAGNOSIS — M54.16 LUMBAR RADICULOPATHY: ICD-10-CM

## 2017-12-04 PROCEDURE — 25010000002 METHYLPREDNISOLONE PER 80 MG: Performed by: PAIN MEDICINE

## 2017-12-04 PROCEDURE — 62323 NJX INTERLAMINAR LMBR/SAC: CPT | Performed by: PAIN MEDICINE

## 2017-12-04 PROCEDURE — 0 IOPAMIDOL 41 % SOLUTION: Performed by: PAIN MEDICINE

## 2017-12-04 RX ORDER — LIDOCAINE HYDROCHLORIDE 20 MG/ML
INJECTION, SOLUTION INFILTRATION; PERINEURAL
Status: COMPLETED | OUTPATIENT
Start: 2017-12-04 | End: 2017-12-04

## 2017-12-04 RX ORDER — METHYLPREDNISOLONE ACETATE 80 MG/ML
INJECTION, SUSPENSION INTRA-ARTICULAR; INTRALESIONAL; INTRAMUSCULAR; SOFT TISSUE
Status: COMPLETED | OUTPATIENT
Start: 2017-12-04 | End: 2017-12-04

## 2017-12-04 RX ADMIN — IOPAMIDOL 2 ML: 408 INJECTION, SOLUTION INTRATHECAL at 14:18

## 2017-12-04 RX ADMIN — METHYLPREDNISOLONE ACETATE 80 MG: 80 INJECTION, SUSPENSION INTRA-ARTICULAR; INTRALESIONAL; INTRAMUSCULAR; SOFT TISSUE at 14:18

## 2017-12-04 RX ADMIN — LIDOCAINE HYDROCHLORIDE 2 ML: 20 INJECTION, SOLUTION INFILTRATION; PERINEURAL at 14:18

## 2017-12-07 RX ORDER — CETIRIZINE HYDROCHLORIDE 10 MG/1
TABLET ORAL
Qty: 90 TABLET | Refills: 8 | Status: SHIPPED | OUTPATIENT
Start: 2017-12-07 | End: 2018-06-27

## 2017-12-07 RX ORDER — MONTELUKAST SODIUM 10 MG/1
TABLET ORAL
Qty: 90 TABLET | Refills: 2 | Status: SHIPPED | OUTPATIENT
Start: 2017-12-07 | End: 2018-06-27

## 2018-01-02 RX ORDER — BACLOFEN 20 MG/1
TABLET ORAL
Qty: 90 TABLET | Refills: 2 | Status: SHIPPED | OUTPATIENT
Start: 2018-01-02 | End: 2018-03-29 | Stop reason: SDUPTHER

## 2018-01-02 RX ORDER — PROMETHAZINE HYDROCHLORIDE 25 MG/1
TABLET ORAL
Qty: 30 TABLET | Refills: 2 | Status: SHIPPED | OUTPATIENT
Start: 2018-01-02 | End: 2018-04-13 | Stop reason: SDUPTHER

## 2018-01-04 RX ORDER — TRAMADOL HYDROCHLORIDE 50 MG/1
50 TABLET ORAL EVERY 6 HOURS PRN
Qty: 120 TABLET | Refills: 2 | Status: SHIPPED | OUTPATIENT
Start: 2018-01-04 | End: 2018-01-31 | Stop reason: SDUPTHER

## 2018-01-04 RX ORDER — GABAPENTIN 600 MG/1
600 TABLET ORAL 3 TIMES DAILY
Qty: 90 TABLET | Refills: 2 | Status: SHIPPED | OUTPATIENT
Start: 2018-01-04 | End: 2018-01-31 | Stop reason: SDUPTHER

## 2018-01-24 ENCOUNTER — OFFICE VISIT (OUTPATIENT)
Dept: CARDIAC SURGERY | Facility: CLINIC | Age: 53
End: 2018-01-24

## 2018-01-24 VITALS
OXYGEN SATURATION: 98 % | SYSTOLIC BLOOD PRESSURE: 140 MMHG | WEIGHT: 207 LBS | HEART RATE: 68 BPM | TEMPERATURE: 96.9 F | HEIGHT: 70 IN | DIASTOLIC BLOOD PRESSURE: 100 MMHG | BODY MASS INDEX: 29.63 KG/M2

## 2018-01-24 DIAGNOSIS — I65.22 STENOSIS OF LEFT CAROTID ARTERY: Primary | ICD-10-CM

## 2018-01-24 PROCEDURE — 99213 OFFICE O/P EST LOW 20 MIN: CPT | Performed by: NURSE PRACTITIONER

## 2018-01-24 RX ORDER — TAMSULOSIN HYDROCHLORIDE 0.4 MG/1
CAPSULE ORAL
Qty: 30 CAPSULE | Refills: 2 | Status: SHIPPED | OUTPATIENT
Start: 2018-01-24 | End: 2018-01-24 | Stop reason: SDUPTHER

## 2018-01-24 NOTE — PATIENT INSTRUCTIONS
Mild Carotid Stenosis LEFT - Asymptomatic  Medical Management: STATIN , add ASA 81 mg daily  If you should experience any neurological symptoms including but not limited to visual or speech disturbances confusion, seizures, or weakness of limbs of one side of your body notify [facility] center immediately for evaluation or if after hours present to the nearest Emergency Department.      Return 1 year - Carotid Duplex

## 2018-01-31 ENCOUNTER — OFFICE VISIT (OUTPATIENT)
Dept: FAMILY MEDICINE CLINIC | Facility: CLINIC | Age: 53
End: 2018-01-31

## 2018-01-31 ENCOUNTER — APPOINTMENT (OUTPATIENT)
Dept: LAB | Facility: HOSPITAL | Age: 53
End: 2018-01-31

## 2018-01-31 VITALS
SYSTOLIC BLOOD PRESSURE: 130 MMHG | HEART RATE: 69 BPM | HEIGHT: 70 IN | WEIGHT: 209 LBS | BODY MASS INDEX: 29.92 KG/M2 | DIASTOLIC BLOOD PRESSURE: 84 MMHG

## 2018-01-31 DIAGNOSIS — I10 ESSENTIAL HYPERTENSION: ICD-10-CM

## 2018-01-31 DIAGNOSIS — J34.2 DEVIATED SEPTUM: ICD-10-CM

## 2018-01-31 DIAGNOSIS — J32.9 RECURRENT SINUSITIS: ICD-10-CM

## 2018-01-31 DIAGNOSIS — M48.062 SPINAL STENOSIS OF LUMBAR REGION WITH NEUROGENIC CLAUDICATION: Primary | ICD-10-CM

## 2018-01-31 LAB
ALBUMIN SERPL-MCNC: 4.4 G/DL (ref 3.4–4.8)
ALBUMIN/GLOB SERPL: 1.5 G/DL (ref 1.1–1.8)
ALP SERPL-CCNC: 72 U/L (ref 38–126)
ALT SERPL W P-5'-P-CCNC: 40 U/L (ref 21–72)
ANION GAP SERPL CALCULATED.3IONS-SCNC: 12 MMOL/L (ref 5–15)
ARTICHOKE IGE QN: 107 MG/DL (ref 1–129)
AST SERPL-CCNC: 37 U/L (ref 17–59)
BILIRUB SERPL-MCNC: 0.6 MG/DL (ref 0.2–1.3)
BUN BLD-MCNC: 15 MG/DL (ref 7–21)
BUN/CREAT SERPL: 15.2 (ref 7–25)
CALCIUM SPEC-SCNC: 9.7 MG/DL (ref 8.4–10.2)
CHLORIDE SERPL-SCNC: 100 MMOL/L (ref 95–110)
CHOLEST SERPL-MCNC: 178 MG/DL (ref 0–199)
CO2 SERPL-SCNC: 29 MMOL/L (ref 22–31)
CREAT BLD-MCNC: 0.99 MG/DL (ref 0.7–1.3)
DEPRECATED RDW RBC AUTO: 45.1 FL (ref 35.1–43.9)
ERYTHROCYTE [DISTWIDTH] IN BLOOD BY AUTOMATED COUNT: 13.9 % (ref 11.5–14.5)
GFR SERPL CREATININE-BSD FRML MDRD: 79 ML/MIN/1.73 (ref 60–130)
GLOBULIN UR ELPH-MCNC: 2.9 GM/DL (ref 2.3–3.5)
GLUCOSE BLD-MCNC: 93 MG/DL (ref 60–100)
HBA1C MFR BLD: 5.5 % (ref 4–5.6)
HCT VFR BLD AUTO: 42.1 % (ref 39–49)
HDLC SERPL-MCNC: 39 MG/DL (ref 60–200)
HGB BLD-MCNC: 14.5 G/DL (ref 13.7–17.3)
LDLC/HDLC SERPL: 2.89 {RATIO} (ref 0–3.55)
MCH RBC QN AUTO: 30.7 PG (ref 26.5–34)
MCHC RBC AUTO-ENTMCNC: 34.4 G/DL (ref 31.5–36.3)
MCV RBC AUTO: 89 FL (ref 80–98)
PLATELET # BLD AUTO: 216 10*3/MM3 (ref 150–450)
PMV BLD AUTO: 11.2 FL (ref 8–12)
POTASSIUM BLD-SCNC: 3.4 MMOL/L (ref 3.5–5.1)
PROT SERPL-MCNC: 7.3 G/DL (ref 6.3–8.6)
RBC # BLD AUTO: 4.73 10*6/MM3 (ref 4.37–5.74)
SODIUM BLD-SCNC: 141 MMOL/L (ref 137–145)
TRIGL SERPL-MCNC: 131 MG/DL (ref 20–199)
WBC NRBC COR # BLD: 7.61 10*3/MM3 (ref 3.2–9.8)

## 2018-01-31 PROCEDURE — 80061 LIPID PANEL: CPT | Performed by: FAMILY MEDICINE

## 2018-01-31 PROCEDURE — 96372 THER/PROPH/DIAG INJ SC/IM: CPT | Performed by: FAMILY MEDICINE

## 2018-01-31 PROCEDURE — 36415 COLL VENOUS BLD VENIPUNCTURE: CPT | Performed by: FAMILY MEDICINE

## 2018-01-31 PROCEDURE — 99214 OFFICE O/P EST MOD 30 MIN: CPT | Performed by: FAMILY MEDICINE

## 2018-01-31 PROCEDURE — 83036 HEMOGLOBIN GLYCOSYLATED A1C: CPT | Performed by: FAMILY MEDICINE

## 2018-01-31 PROCEDURE — 80053 COMPREHEN METABOLIC PANEL: CPT | Performed by: FAMILY MEDICINE

## 2018-01-31 PROCEDURE — 85027 COMPLETE CBC AUTOMATED: CPT | Performed by: FAMILY MEDICINE

## 2018-01-31 RX ORDER — TRAMADOL HYDROCHLORIDE 50 MG/1
50 TABLET ORAL EVERY 6 HOURS PRN
Qty: 120 TABLET | Refills: 2 | Status: SHIPPED | OUTPATIENT
Start: 2018-01-31 | End: 2018-01-31 | Stop reason: SDUPTHER

## 2018-01-31 RX ORDER — ASPIRIN 81 MG/1
81 TABLET ORAL DAILY
COMMUNITY
End: 2018-07-03 | Stop reason: HOSPADM

## 2018-01-31 RX ORDER — TRIAMCINOLONE ACETONIDE 40 MG/ML
80 INJECTION, SUSPENSION INTRA-ARTICULAR; INTRAMUSCULAR ONCE
Status: COMPLETED | OUTPATIENT
Start: 2018-01-31 | End: 2018-01-31

## 2018-01-31 RX ORDER — GABAPENTIN 600 MG/1
600 TABLET ORAL 3 TIMES DAILY
Qty: 90 TABLET | Refills: 2 | Status: SHIPPED | OUTPATIENT
Start: 2018-01-31 | End: 2018-05-02 | Stop reason: SDUPTHER

## 2018-01-31 RX ORDER — GABAPENTIN 600 MG/1
600 TABLET ORAL 3 TIMES DAILY
Qty: 90 TABLET | Refills: 2 | Status: SHIPPED | OUTPATIENT
Start: 2018-01-31 | End: 2018-01-31 | Stop reason: SDUPTHER

## 2018-01-31 RX ORDER — KETOCONAZOLE 20 MG/ML
SHAMPOO TOPICAL 2 TIMES WEEKLY
Qty: 120 ML | Refills: 1 | Status: SHIPPED | OUTPATIENT
Start: 2018-02-01 | End: 2018-07-20

## 2018-01-31 RX ORDER — EPINEPHRINE 0.3 MG/.3ML
0.3 INJECTION SUBCUTANEOUS ONCE
Qty: 2 EACH | Refills: 0 | Status: SHIPPED | OUTPATIENT
Start: 2018-01-31 | End: 2018-01-31

## 2018-01-31 RX ORDER — TRAMADOL HYDROCHLORIDE 50 MG/1
50 TABLET ORAL EVERY 6 HOURS PRN
Qty: 120 TABLET | Refills: 2 | Status: SHIPPED | OUTPATIENT
Start: 2018-01-31 | End: 2018-05-02 | Stop reason: SDUPTHER

## 2018-01-31 RX ADMIN — TRIAMCINOLONE ACETONIDE 80 MG: 40 INJECTION, SUSPENSION INTRA-ARTICULAR; INTRAMUSCULAR at 14:27

## 2018-01-31 NOTE — PROGRESS NOTES
Subjective   Evangelist Gipson is a 52 y.o. male.     Hypertension   This is a chronic problem. The current episode started more than 1 year ago. The problem has been waxing and waning since onset. The problem is controlled. Associated symptoms include headaches. Pertinent negatives include no anxiety, blurred vision, chest pain, malaise/fatigue, neck pain, orthopnea, palpitations, peripheral edema, PND, shortness of breath or sweats. There are no associated agents to hypertension. Risk factors for coronary artery disease include dyslipidemia, family history, obesity and male gender. Past treatments include beta blockers, diuretics and calcium channel blockers. The current treatment provides moderate improvement. There are no compliance problems.  There is no history of angina, kidney disease, CAD/MI, CVA, heart failure, left ventricular hypertrophy, PVD or retinopathy. carotid artery stenosis on the left.     He has been on tramadol and gabapentin for his back pain. He has spinal stenosis. He would like to come off those medications because they are controlled.  He had been seeing a neurosurgeon in [place] and they were trying to avoid surgery if possible.  Since he has been here he had a spell where his back went out.  He says that typically his back pain is well controlled with his tramadol and gabapentin.    He has been having recurrent sinus issues.  He has had a lot of issues with allergies in the past.  He has a deviated septum and that hasn't been repaired.  He seems to keep sinus infections.  He saw ENT about 1 year ago.  He had a sinus infection last time that he was here and now he thinks that he has another one. He has had purulent drainage and also has had several nosebleeds. Hasn't been spending a lot of time outside.  He has been taking floanse and allergy medicaiton and hasn't ever stopped taking those.        Current Outpatient Prescriptions:   •  albuterol (PROAIR RESPICLICK) 108 (90 BASE)  MCG/ACT inhaler, Inhale 2 puffs every 4 (four) hours as needed for shortness of air., Disp: , Rfl:   •  amLODIPine (NORVASC) 10 MG tablet, TAKE 1 TABLET BY MOUTH DAILY., Disp: 30 tablet, Rfl: 2  •  aspirin 81 MG EC tablet, Take 81 mg by mouth Daily., Disp: , Rfl:   •  baclofen (LIORESAL) 20 MG tablet, TAKE ONE TABLET BY MOUTH THREE TIMES A DAY, Disp: 90 tablet, Rfl: 2  •  cetirizine (zyrTEC) 10 MG tablet, TAKE ONE TABLET BY MOUTH DAILY, Disp: 90 tablet, Rfl: 8  •  chlorothiazide (DIURIL) 500 MG tablet, Take 1 tablet by mouth Every Morning., Disp: 30 tablet, Rfl: 2  •  clonazePAM (KlonoPIN) 0.5 MG tablet, Take 0.5 mg by mouth 3 (three) times a day as needed., Disp: , Rfl:   •  cyclobenzaprine (FLEXERIL) 5 MG tablet, TAKE 1-2 TABLETS BY MOUTH TWO TIMES A DAY AS NEEDED., Disp: 120 tablet, Rfl: 2  •  EPINEPHrine (EPIPEN 2-YENNY) 0.3 MG/0.3ML solution auto-injector injection, , Disp: , Rfl:   •  fluticasone (FLONASE) 50 MCG/ACT nasal spray, USE ONE SPRAY IN EACH NOSTRIL TWO TIMES A DAY. SHAKE GENTLY, Disp: 16 g, Rfl: 2  •  gabapentin (NEURONTIN) 600 MG tablet, Take 1 tablet by mouth 3 (Three) Times a Day., Disp: 90 tablet, Rfl: 2  •  metoprolol tartrate (LOPRESSOR) 100 MG tablet, TAKE 1 TABLET BY MOUTH 2 (TWO) TIMES A DAY., Disp: 60 tablet, Rfl: 2  •  metoprolol tartrate (LOPRESSOR) 25 MG tablet, TAKE 1 TABLET BY MOUTH 2 (TWO) TIMES A DAY WITH 100MG FOR TOTAL OF 125MG, Disp: 60 tablet, Rfl: 2  •  montelukast (SINGULAIR) 10 MG tablet, TAKE ONE TABLET BY MOUTH AT BEDTIME, Disp: 90 tablet, Rfl: 2  •  pravastatin (PRAVACHOL) 20 MG tablet, Take 1 tablet by mouth Daily., Disp: 90 tablet, Rfl: 2  •  promethazine (PHENERGAN) 25 MG tablet, TAKE 1 TABLET BY MOUTH EVERY 6 (SIX) HOURS AS NEEDED FOR NAUSEA OR VOMITING., Disp: 30 tablet, Rfl: 2  •  tamsulosin (FLOMAX) 0.4 MG capsule 24 hr capsule, Take 1 capsule by mouth Every Night., Disp: 30 capsule, Rfl: 2  •  traMADol (ULTRAM) 50 MG tablet, Take 1 tablet by mouth Every 6 (Six) Hours  "As Needed for Moderate Pain ., Disp: 120 tablet, Rfl: 2    The following portions of the patient's history were reviewed and updated as appropriate: allergies, current medications, past family history, past medical history, past social history, past surgical history and problem list.    Review of Systems   Constitutional: Negative for activity change, appetite change, chills, diaphoresis, fatigue, fever, malaise/fatigue and unexpected weight change.   HENT: Positive for congestion, ear pain, postnasal drip, rhinorrhea, sinus pain, sinus pressure, sneezing and sore throat. Negative for ear discharge.    Eyes: Negative for blurred vision and visual disturbance.   Respiratory: Positive for cough. Negative for chest tightness, shortness of breath and wheezing.    Cardiovascular: Negative for chest pain, palpitations, orthopnea, leg swelling and PND.   Gastrointestinal: Negative for abdominal distention, abdominal pain, constipation, diarrhea, nausea and vomiting.   Genitourinary: Negative for dysuria.   Musculoskeletal: Positive for arthralgias and back pain. Negative for gait problem, joint swelling, myalgias and neck pain.   Skin: Negative for pallor, rash and wound.   Neurological: Positive for weakness, numbness and headaches.   Psychiatric/Behavioral: Negative for dysphoric mood and sleep disturbance. The patient is not nervous/anxious.        Objective    Vitals:    01/31/18 1329   BP: 130/84   Pulse: 69   Weight: 94.8 kg (209 lb)   Height: 177.8 cm (70\")       Physical Exam   Constitutional: He is oriented to person, place, and time. He appears well-developed and well-nourished. No distress.   HENT:   Right Ear: Hearing, tympanic membrane, external ear and ear canal normal.   Left Ear: Hearing, tympanic membrane, external ear and ear canal normal.   Nose: Mucosal edema and rhinorrhea present. Right sinus exhibits maxillary sinus tenderness. Right sinus exhibits no frontal sinus tenderness. Left sinus exhibits " maxillary sinus tenderness. Left sinus exhibits no frontal sinus tenderness.   Mouth/Throat: Posterior oropharyngeal erythema present. No oropharyngeal exudate or posterior oropharyngeal edema.   Cardiovascular: Normal rate, regular rhythm and normal heart sounds.    No murmur heard.  No LE edema.   Pulmonary/Chest: Effort normal and breath sounds normal. No respiratory distress. He has no wheezes.   Abdominal: Soft. Bowel sounds are normal. He exhibits no distension. There is no tenderness.   Musculoskeletal:        Lumbar back: He exhibits pain and spasm. He exhibits normal range of motion.   Neurological: He is alert and oriented to person, place, and time.   Psychiatric: He has a normal mood and affect. His behavior is normal. Judgment and thought content normal.   Nursing note and vitals reviewed.      Assessment/Plan   Problems Addressed this Visit        Cardiovascular and Mediastinum    Hypertension    Relevant Medications    EPINEPHrine (EPIPEN 2-YENNY) 0.3 MG/0.3ML solution auto-injector injection       Other    Spinal stenosis of lumbar region - Primary    Relevant Medications    traMADol (ULTRAM) 50 MG tablet    triamcinolone acetonide (KENALOG-40) injection 80 mg (Completed)      Other Visit Diagnoses     Recurrent sinusitis        Relevant Medications    triamcinolone acetonide (KENALOG-40) injection 80 mg (Completed)    cefTRIAXone (ROCEPHIN) 1 g in lidocaine PF 1% (XYLOCAINE) IM only syringe (Completed)    Other Relevant Orders    Ambulatory Referral to ENT (Otolaryngology)    Deviated septum        Relevant Orders    Ambulatory Referral to ENT (Otolaryngology)        1.) Spinal Stenosis-  Will continue current medications.  UDS at next appointment. The patient has read and signed the [facility] Controlled Substance Contract.  I will continue to see patient for regular follow up appointments.  They are well controlled on their medication.  JUDITH has been reviewed by me and is updated every 3  months. The patient is aware of the potential for addiction and dependence.  2.) HTN-  Will continue current medications. Continue to monitor at home. Repeat labs when he comes back in.  3.) Recurrent sinusitis-  Will treat with rocephin and kenalog.  He has had several infections over the last several years. Will get him in with Dr. Lang to reevaluate deviated nasal septum since he is having a lot of issues with sinuses.  RTC in 3 months or sooner PRN

## 2018-01-31 NOTE — PROGRESS NOTES
Subjective   Patient ID: Evangelist Gipson is a 52 y.o. male is here today for follow-up.    Chief Complaint:    Chief Complaint   Patient presents with   • Carotid Artery Disease     1 year follow up        History of Present Illness  The following portions of the patient's history were reviewed and updated as appropriate: allergies, current medications, past family history, past medical history, past social history, past surgical history and problem list.  Recent images independently reviewed.  Available laboratory values reviewed.  PCP:  Ashley Watts MD  Cardiology:  Dr Causey ([facility])      52 y.o. male with HTN, carotid stenosis, CAD,  tourettes, DJD, anxiety, chronic back pain, DAVY.  former smoker.  Moderate vibration head when bending over x 6 months.  Abnormal MRI brain.  Controlled sleep apnea on bipap x 2 months.  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  No other associated signs, symptoms or modifying factors.    12/2014 Carotid Duplex:  PAXTON 0-15% (85cm/s), LICA 0-15% (122cm/s)  7/2016 Carotid Duplex:  PAXTON 0-15% (76cm/s) antegrade vert, LICA 0-15% (68cm/s) antegrade vert  7/2016 MRI Neck: PAXTON small ulceration posterior medial proximal ICA.  LICA 35%  7/2016 MRI Brain:  Mild microvascular disease.  11mm LEFT maxillary sinus retention cyst.  1/24/18: Carotid Duplex: PAXTON 0-49% LICA 0-49%. Antegrade      Past Medical History:   Diagnosis Date   • Allergic contact dermatitis    • Allergic rhinitis    • Anxiety    • Anxiety    • Arthritis    • Asthma    • Asthma    • Benign lipomatous neoplasm      Small lipoma clinically right chest wall      • Chest wall tenderness     right side of sternum   • Epilepsy    • Food allergy    • [place] de la Tourette's syndrome     neurology had been seeing him in [place]      • Headache    • Hypertension    • Hypertension    • Hypertension    • Insomnia    • Internal hemorrhoids    • Low back pain    • Malaise and fatigue    •  Multiple joint pain    • Need for influenza vaccination    • Neuralgia and neuritis    • Polyp of intestine     +FH, leiomyoma      • Radiculitis    • Seborrheic dermatitis    • Skin sensation disturbance    • Spasm of back muscles    • Spinal stenosis of lumbar region      Past Surgical History:   Procedure Laterality Date   • COLONOSCOPY W/ POLYPECTOMY  05/27/2015    A single polyp was found in the colon; removed by snare cautery polypectomy. Internal and external hemorrhoids found.   • ENDOSCOPY  05/27/2015    Esophagitis seen. Biopsy taken. Gastritis found in the body of the stomach. Biopsy taken. Normal duodenum.   • INCISION AND DRAINAGE ABSCESS  09/24/2013   • INJECTION OF MEDICATION  02/29/2016    Depo Medrol (Methylprednisone)   • INJECTION OF MEDICATION  06/23/2016    Kenalog   • INJECTION OF MEDICATION  10/21/2015    Toradol   • PROCEDURE GENERIC CONVERTED  03/21/2016    BMI NOT DOC NO REASON GIVEN    • PROCEDURE GENERIC CONVERTED  05/04/2016    CT OF SINUS ORD W/SINUSITIS DX FOR DOC RSN    • PROCEDURE GENERIC CONVERTED  08/27/2015    MOST RECENT SYSTOLIC BP < 140 mmHg    • PROCEDURE GENERIC CONVERTED  03/21/2016    TOBACCO NON-USER    • SKIN BIOPSY  02/26/2016   • STEROID INJECTION  04/14/2016    Celestone (betamethasone)       ALLERGIES:   Gelatin; Ambien [zolpidem tartrate]; Beef-derived products; Dairy aid [lactase]; Latex; Pork-derived products; and Remeron [mirtazapine]    MEDICATIONS:      Current Outpatient Prescriptions:   •  albuterol (PROAIR RESPICLICK) 108 (90 BASE) MCG/ACT inhaler, Inhale 2 puffs every 4 (four) hours as needed for shortness of air., Disp: , Rfl:   •  amLODIPine (NORVASC) 10 MG tablet, TAKE 1 TABLET BY MOUTH DAILY., Disp: 30 tablet, Rfl: 2  •  baclofen (LIORESAL) 20 MG tablet, TAKE ONE TABLET BY MOUTH THREE TIMES A DAY, Disp: 90 tablet, Rfl: 2  •  cetirizine (zyrTEC) 10 MG tablet, TAKE ONE TABLET BY MOUTH DAILY, Disp: 90 tablet, Rfl: 8  •  chlorothiazide (DIURIL) 500 MG  tablet, Take 1 tablet by mouth Every Morning., Disp: 30 tablet, Rfl: 2  •  clonazePAM (KlonoPIN) 0.5 MG tablet, Take 0.5 mg by mouth 3 (three) times a day as needed., Disp: , Rfl:   •  cyclobenzaprine (FLEXERIL) 5 MG tablet, TAKE 1-2 TABLETS BY MOUTH TWO TIMES A DAY AS NEEDED., Disp: 120 tablet, Rfl: 2  •  fluticasone (FLONASE) 50 MCG/ACT nasal spray, USE ONE SPRAY IN EACH NOSTRIL TWO TIMES A DAY. SHAKE GENTLY, Disp: 16 g, Rfl: 2  •  metoprolol tartrate (LOPRESSOR) 100 MG tablet, TAKE 1 TABLET BY MOUTH 2 (TWO) TIMES A DAY., Disp: 60 tablet, Rfl: 2  •  metoprolol tartrate (LOPRESSOR) 25 MG tablet, TAKE 1 TABLET BY MOUTH 2 (TWO) TIMES A DAY WITH 100MG FOR TOTAL OF 125MG, Disp: 60 tablet, Rfl: 2  •  montelukast (SINGULAIR) 10 MG tablet, TAKE ONE TABLET BY MOUTH AT BEDTIME, Disp: 90 tablet, Rfl: 2  •  pravastatin (PRAVACHOL) 20 MG tablet, Take 1 tablet by mouth Daily., Disp: 90 tablet, Rfl: 2  •  promethazine (PHENERGAN) 25 MG tablet, TAKE 1 TABLET BY MOUTH EVERY 6 (SIX) HOURS AS NEEDED FOR NAUSEA OR VOMITING., Disp: 30 tablet, Rfl: 2  •  tamsulosin (FLOMAX) 0.4 MG capsule 24 hr capsule, Take 1 capsule by mouth Every Night., Disp: 30 capsule, Rfl: 2  •  aspirin 81 MG EC tablet, Take 81 mg by mouth Daily., Disp: , Rfl:   •  EPINEPHrine (EPIPEN 2-YENNY) 0.3 MG/0.3ML solution auto-injector injection, Inject 0.3 mL into the shoulder, thigh, or buttocks 1 (One) Time for 1 dose., Disp: 2 each, Rfl: 0  •  gabapentin (NEURONTIN) 600 MG tablet, Take 1 tablet by mouth 3 (Three) Times a Day., Disp: 90 tablet, Rfl: 2  •  traMADol (ULTRAM) 50 MG tablet, Take 1 tablet by mouth Every 6 (Six) Hours As Needed for Moderate Pain ., Disp: 120 tablet, Rfl: 2    Review of Systems   Constitution: Negative for weakness.   Eyes: Negative for visual disturbance.   Cardiovascular: Negative for claudication and cyanosis.   Respiratory: Negative for shortness of breath.    Skin: Negative for color change and nail changes.   Musculoskeletal: Positive  for arthritis, back pain and muscle weakness.   Gastrointestinal: Negative for dysphagia.   Neurological: Positive for dizziness. Negative for focal weakness, light-headedness, loss of balance, numbness and paresthesias.   Psychiatric/Behavioral: Negative for altered mental status.        Objective   Heart Rate:  [69] 69  BP: (130)/(84) 130/84  Body mass index is 29.7 kg/(m^2).  Physical Exam   Constitutional: He is oriented to person, place, and time. He appears well-developed.   HENT:   Head: Normocephalic.   Eyes: EOM are normal.   Neck: Neck supple. Carotid bruit is not present.   Cardiovascular: Normal rate.    Pulses:       Dorsalis pedis pulses are 2+ on the right side, and 2+ on the left side.        Posterior tibial pulses are 2+ on the right side, and 2+ on the left side.   Pulmonary/Chest: Effort normal and breath sounds normal.   Abdominal: Soft. Bowel sounds are normal.   Musculoskeletal: Normal range of motion.   Gait normal   Neurological: He is alert and oriented to person, place, and time.   Skin: Skin is warm and dry.   No venous staining   Psychiatric: He has a normal mood and affect. Thought content normal.   Vitals reviewed.          Assessment/Plan   Independent Review of Radiographic Studies:    Detailed discussion regarding risks, benefits, and treatment plan.  Patient understands, agrees, and wishes to proceed with plan.     1. Stenosis of left carotid artery  Mild Carotid Stenosis LEFT - Asymptomatic  Medical Management: STATIN , add ASA 81 mg daily  If you should experience any neurological symptoms including but not limited to visual or speech disturbances confusion, seizures, or weakness of limbs of one side of your body notify Heart and Vascular center immediately for evaluation or if after hours present to the nearest Emergency Department.      Return 1 year - Carotid Duplex              This document has been electronically signed by MILLICENT Bhardwaj on January 31, 2018 2:04  PM

## 2018-02-05 ENCOUNTER — TELEPHONE (OUTPATIENT)
Dept: FAMILY MEDICINE CLINIC | Facility: CLINIC | Age: 53
End: 2018-02-05

## 2018-02-09 RX ORDER — CHLOROTHIAZIDE 500 MG
TABLET ORAL
Qty: 30 TABLET | Refills: 2 | Status: SHIPPED | OUTPATIENT
Start: 2018-02-09 | End: 2018-05-04 | Stop reason: SDUPTHER

## 2018-02-19 ENCOUNTER — OFFICE VISIT (OUTPATIENT)
Dept: SLEEP MEDICINE | Facility: HOSPITAL | Age: 53
End: 2018-02-19

## 2018-02-19 VITALS
DIASTOLIC BLOOD PRESSURE: 70 MMHG | BODY MASS INDEX: 29.63 KG/M2 | SYSTOLIC BLOOD PRESSURE: 130 MMHG | WEIGHT: 207 LBS | HEART RATE: 62 BPM | OXYGEN SATURATION: 98 % | HEIGHT: 70 IN

## 2018-02-19 DIAGNOSIS — G47.33 OBSTRUCTIVE SLEEP APNEA, ADULT: Primary | ICD-10-CM

## 2018-02-19 PROCEDURE — 99213 OFFICE O/P EST LOW 20 MIN: CPT | Performed by: INTERNAL MEDICINE

## 2018-02-19 NOTE — PROGRESS NOTES
Sleep Clinic Follow Up    Date: 2/19/2018  Primary Care Physician: Ashley Watts MD      Interim History (1/3):  Since the last visit on 02/21/2017, patient has:      1)  DAVY - Has remained compliant with Bipap S/T. He denies mask and machine issues, dry mouth, headaches, pressures intolerance, or non-compliance. He denies abnormal dreams, sleep paralysis, but has nasal congestion, with recurrent infections.     PAP Data:  Time frame: 02/21/2017 - 02/18/2018   Compliance 100 %  PAP range : 20/9 cm H2O with rate of 14   Average 90% pressure: 20/9 cmH2O  Average AHI 2.6 events/hr  Mask type: nasal  DME: BG    Bed time: 2230  Sleep latency: 30 minutes  Number of times awakens during the night: 1-2  Wake time: 0800  Estimated total sleep time at night: 8-10 hours  Caffeine intake: 2 coffee  Alcohol intake: none  Nap time: once daily  Sleepiness with Driving: none    [place] - 6    2) Patient denies RLS symptoms.     PMHx, FH, SH reviewed and pertinent changes are: carpal tunnel and trigger thumb release bilaterally.      REVIEW OF SYSTEMS:   Negative for chest pain, fever, chills, SOA, abdominal pain. Smoking: none      Exam (6-11/12):    Vitals:    02/19/18 1513   BP: 130/70   Pulse: 62   SpO2: 98%       Body mass index is 29.7 kg/(m^2). Patient's BMI is above normal parameters. Follow-up plan includes:  exercise counseling.      Gen:  No distress, conversant, pleasant, appears stated age, alert, oriented  Eyes:   Anicteric sclera, moist conjunctiva, no lid lag     PERRLA, EOMI   Heent:   NC/AT    Oropharynx clear, Mallampati 4    normal hearing  Lungs:  Normal effort, non-labored breathing    Clear to auscultation    CV:  Normal S1/S2, no murmur    no lower extremity edema  ABD:  Soft, normal bowel sounds    Psych:  Appropriate affect  Neuro:  CN 2-12 intact    Past Medical History:   Diagnosis Date   • Allergic contact dermatitis    • Allergic rhinitis    • Anxiety    • Anxiety    • Arthritis    • Asthma     • Asthma    • Benign lipomatous neoplasm      Small lipoma clinically right chest wall      • Chest wall tenderness     right side of sternum   • Epilepsy    • Food allergy    • [place] de la Tourette's syndrome     neurology had been seeing him in [place]      • Headache    • Hypertension    • Hypertension    • Hypertension    • Insomnia    • Internal hemorrhoids    • Low back pain    • Malaise and fatigue    • Multiple joint pain    • Need for influenza vaccination    • Neuralgia and neuritis    • Polyp of intestine     +FH, leiomyoma      • Radiculitis    • Seborrheic dermatitis    • Skin sensation disturbance    • Spasm of back muscles    • Spinal stenosis of lumbar region        Current Outpatient Prescriptions:   •  albuterol (PROAIR RESPICLICK) 108 (90 BASE) MCG/ACT inhaler, Inhale 2 puffs every 4 (four) hours as needed for shortness of air., Disp: , Rfl:   •  amLODIPine (NORVASC) 10 MG tablet, TAKE 1 TABLET BY MOUTH DAILY., Disp: 30 tablet, Rfl: 2  •  aspirin 81 MG EC tablet, Take 81 mg by mouth Daily., Disp: , Rfl:   •  baclofen (LIORESAL) 20 MG tablet, TAKE ONE TABLET BY MOUTH THREE TIMES A DAY, Disp: 90 tablet, Rfl: 2  •  cetirizine (zyrTEC) 10 MG tablet, TAKE ONE TABLET BY MOUTH DAILY, Disp: 90 tablet, Rfl: 8  •  chlorothiazide (DIURIL) 500 MG tablet, TAKE 1 TABLET BY MOUTH EVERY MORNING., Disp: 30 tablet, Rfl: 2  •  clonazePAM (KlonoPIN) 0.5 MG tablet, Take 0.5 mg by mouth 3 (three) times a day as needed., Disp: , Rfl:   •  cyclobenzaprine (FLEXERIL) 5 MG tablet, TAKE 1-2 TABLETS BY MOUTH TWO TIMES A DAY AS NEEDED., Disp: 120 tablet, Rfl: 2  •  fluticasone (FLONASE) 50 MCG/ACT nasal spray, USE ONE SPRAY IN EACH NOSTRIL TWO TIMES A DAY. SHAKE GENTLY, Disp: 16 g, Rfl: 2  •  gabapentin (NEURONTIN) 600 MG tablet, Take 1 tablet by mouth 3 (Three) Times a Day., Disp: 90 tablet, Rfl: 2  •  ketoconazole (NIZORAL) 2 % shampoo, Apply  topically 2 (Two) Times a Week., Disp: 120 mL, Rfl: 1  •  metoprolol  tartrate (LOPRESSOR) 100 MG tablet, TAKE 1 TABLET BY MOUTH 2 (TWO) TIMES A DAY., Disp: 60 tablet, Rfl: 2  •  metoprolol tartrate (LOPRESSOR) 25 MG tablet, TAKE ONE TABLET BY MOUTH TWO TIMES A DAY WITH 100MG TABLET, Disp: 60 tablet, Rfl: 2  •  montelukast (SINGULAIR) 10 MG tablet, TAKE ONE TABLET BY MOUTH AT BEDTIME, Disp: 90 tablet, Rfl: 2  •  pravastatin (PRAVACHOL) 20 MG tablet, Take 1 tablet by mouth Daily., Disp: 90 tablet, Rfl: 2  •  promethazine (PHENERGAN) 25 MG tablet, TAKE 1 TABLET BY MOUTH EVERY 6 (SIX) HOURS AS NEEDED FOR NAUSEA OR VOMITING., Disp: 30 tablet, Rfl: 2  •  tamsulosin (FLOMAX) 0.4 MG capsule 24 hr capsule, Take 1 capsule by mouth Every Night., Disp: 30 capsule, Rfl: 2  •  traMADol (ULTRAM) 50 MG tablet, Take 1 tablet by mouth Every 6 (Six) Hours As Needed for Moderate Pain ., Disp: 120 tablet, Rfl: 2      ASSESSMENT / PLAN:     1. Complex obstructive and central sleep apnea  1. PSG on 10/12/2016, AHI of 15.5  2. Currently on 20/9 cm H2O with 14 rate  3. Continue PAP as prescribed.   4. Script for PAP supplies  5. Return to clinic in 1 year with compliance check unless sx change in the interim period.  2. Allergic rhinitis - has appt with ENT  3. Alpha gal (+)  4. Previous RMSF      Total time 15 min, more than half spent in face to face counseling and coordination of care.     This document has been electronically signed by Juan Francisco Queen MD on February 19, 2018         CC: Ashley Watts MD          No ref. provider found

## 2018-02-20 DIAGNOSIS — I10 ESSENTIAL HYPERTENSION: ICD-10-CM

## 2018-02-20 RX ORDER — METOPROLOL TARTRATE 100 MG/1
TABLET ORAL
Qty: 60 TABLET | Refills: 2 | Status: SHIPPED | OUTPATIENT
Start: 2018-02-20 | End: 2018-05-23 | Stop reason: SDUPTHER

## 2018-02-20 RX ORDER — AMLODIPINE BESYLATE 10 MG/1
TABLET ORAL
Qty: 30 TABLET | Refills: 2 | Status: SHIPPED | OUTPATIENT
Start: 2018-02-20 | End: 2018-05-23 | Stop reason: SDUPTHER

## 2018-03-02 ENCOUNTER — OFFICE VISIT (OUTPATIENT)
Dept: OTOLARYNGOLOGY | Facility: CLINIC | Age: 53
End: 2018-03-02

## 2018-03-02 VITALS — HEIGHT: 70 IN | BODY MASS INDEX: 29.35 KG/M2 | WEIGHT: 205 LBS | TEMPERATURE: 96.8 F

## 2018-03-02 DIAGNOSIS — J32.8 OTHER CHRONIC SINUSITIS: ICD-10-CM

## 2018-03-02 DIAGNOSIS — J34.3 NASAL TURBINATE HYPERTROPHY: ICD-10-CM

## 2018-03-02 DIAGNOSIS — J34.2 DEVIATED SEPTUM: Primary | ICD-10-CM

## 2018-03-02 PROCEDURE — 99203 OFFICE O/P NEW LOW 30 MIN: CPT | Performed by: OTOLARYNGOLOGY

## 2018-03-02 RX ORDER — CHLORAL HYDRATE 500 MG
CAPSULE ORAL
COMMUNITY
End: 2018-06-27

## 2018-03-02 RX ORDER — AZELASTINE 1 MG/ML
2 SPRAY, METERED NASAL 2 TIMES DAILY
Qty: 30 ML | Refills: 11 | Status: SHIPPED | OUTPATIENT
Start: 2018-03-02 | End: 2018-05-11 | Stop reason: SDUPTHER

## 2018-03-02 RX ORDER — CEFDINIR 300 MG/1
300 CAPSULE ORAL 2 TIMES DAILY
Qty: 28 CAPSULE | Refills: 0 | Status: SHIPPED | OUTPATIENT
Start: 2018-03-02 | End: 2018-03-29

## 2018-03-02 NOTE — PROGRESS NOTES
Subjective   Evangelist Gipson is a 52 y.o. male.   Nasal obstruction makes it difficult use of CPAP  History of Present Illness   He comes in with nasal congestion pressure over his face struck especially over the maxillary area.     He has nasal obstruction both sides of his nose not have any significant epistaxis currently he has some decreased sense of smell is used to Flonase by itself without success is also uses Zyrtec and Singulair.  He has a diagnosis of a deviated septum.  The nasal Obstructions make it hard from uses CPAP a says he's requires high pressures of his CPAP.    He also some facial pressure there is not having any fever at this point      The following portions of the patient's history were reviewed and updated as appropriate: allergies, current medications, past family history, past medical history, past social history, past surgical history and problem list.      Evangelist Gipson reports that he quit smoking about 22 years ago. He has never used smokeless tobacco. He reports that he does not drink alcohol or use illicit drugs.  Patient is not a tobacco user and has been counseled for use of tobacco products    Family History   Problem Relation Age of Onset   • Heart disease Mother    • Osteoporosis Mother    • Lung cancer Father    • Prostate cancer Father    • Coronary artery disease Father    • COPD Father    • Hyperlipidemia Father    • Hypertension Father    • Diabetes Maternal Grandmother    • Hypertension Other    • Cancer Other    • Alcohol abuse Brother    • Coronary artery disease Brother    • COPD Brother    • Mental illness Brother          Current Outpatient Prescriptions:   •  albuterol (PROAIR RESPICLICK) 108 (90 BASE) MCG/ACT inhaler, Inhale 2 puffs every 4 (four) hours as needed for shortness of air., Disp: , Rfl:   •  amLODIPine (NORVASC) 10 MG tablet, TAKE 1 TABLET BY MOUTH DAILY., Disp: 30 tablet, Rfl: 2  •  aspirin 81 MG EC tablet, Take 81 mg by mouth Daily.,  Disp: , Rfl:   •  baclofen (LIORESAL) 20 MG tablet, TAKE ONE TABLET BY MOUTH THREE TIMES A DAY, Disp: 90 tablet, Rfl: 2  •  cetirizine (zyrTEC) 10 MG tablet, TAKE ONE TABLET BY MOUTH DAILY, Disp: 90 tablet, Rfl: 8  •  chlorothiazide (DIURIL) 500 MG tablet, TAKE 1 TABLET BY MOUTH EVERY MORNING., Disp: 30 tablet, Rfl: 2  •  clonazePAM (KlonoPIN) 0.5 MG tablet, Take 0.5 mg by mouth 3 (three) times a day as needed., Disp: , Rfl:   •  cyclobenzaprine (FLEXERIL) 5 MG tablet, TAKE 1-2 TABLETS BY MOUTH TWO TIMES A DAY AS NEEDED., Disp: 120 tablet, Rfl: 2  •  fluticasone (FLONASE) 50 MCG/ACT nasal spray, USE ONE SPRAY IN EACH NOSTRIL TWO TIMES A DAY. SHAKE GENTLY, Disp: 16 g, Rfl: 2  •  gabapentin (NEURONTIN) 600 MG tablet, Take 1 tablet by mouth 3 (Three) Times a Day., Disp: 90 tablet, Rfl: 2  •  ketoconazole (NIZORAL) 2 % shampoo, Apply  topically 2 (Two) Times a Week., Disp: 120 mL, Rfl: 1  •  metoprolol tartrate (LOPRESSOR) 100 MG tablet, TAKE 1 TABLET BY MOUTH 2 (TWO) TIMES A DAY., Disp: 60 tablet, Rfl: 2  •  metoprolol tartrate (LOPRESSOR) 25 MG tablet, TAKE ONE TABLET BY MOUTH TWO TIMES A DAY WITH 100MG TABLET, Disp: 60 tablet, Rfl: 2  •  montelukast (SINGULAIR) 10 MG tablet, TAKE ONE TABLET BY MOUTH AT BEDTIME, Disp: 90 tablet, Rfl: 2  •  Omega-3 Fatty Acids (FISH OIL) 1000 MG capsule capsule, Take  by mouth Daily With Breakfast., Disp: , Rfl:   •  pravastatin (PRAVACHOL) 20 MG tablet, Take 1 tablet by mouth Daily., Disp: 90 tablet, Rfl: 2  •  promethazine (PHENERGAN) 25 MG tablet, TAKE 1 TABLET BY MOUTH EVERY 6 (SIX) HOURS AS NEEDED FOR NAUSEA OR VOMITING., Disp: 30 tablet, Rfl: 2  •  tamsulosin (FLOMAX) 0.4 MG capsule 24 hr capsule, Take 1 capsule by mouth Every Night., Disp: 30 capsule, Rfl: 2  •  traMADol (ULTRAM) 50 MG tablet, Take 1 tablet by mouth Every 6 (Six) Hours As Needed for Moderate Pain ., Disp: 120 tablet, Rfl: 2  •  azelastine (ASTELIN) 0.1 % nasal spray, 2 sprays into each nostril 2 (Two) Times a  Day. Use in each nostril as directed, Disp: 30 mL, Rfl: 11  •  cefdinir (OMNICEF) 300 MG capsule, Take 1 capsule by mouth 2 (Two) Times a Day. Take with food and probiotics and call office and stop if develop watery diarrhea., Disp: 28 capsule, Rfl: 0    Allergies   Allergen Reactions   • Gelatin Anaphylaxis, Hives and Swelling   • Ambien [Zolpidem Tartrate]    • Beef-Derived Products    • Dairy Aid [Lactase]    • Latex    • Pork-Derived Products    • Remeron [Mirtazapine]        Past Medical History:   Diagnosis Date   • Allergic contact dermatitis    • Allergic rhinitis    • Anxiety    • Anxiety    • Arthritis    • Asthma    • Asthma    • Benign lipomatous neoplasm      Small lipoma clinically right chest wall      • Chest wall tenderness     right side of sternum   • Epilepsy    • Food allergy    • [place] de [place] Tourette's syndrome     neurology had been seeing him in [place]      • Headache    • Hypertension    • Hypertension    • Hypertension    • Insomnia    • Internal hemorrhoids    • Low back pain    • Malaise and fatigue    • Multiple joint pain    • Need for influenza vaccination    • Neuralgia and neuritis    • Polyp of intestine     +FH, leiomyoma      • Radiculitis    • Seborrheic dermatitis    • Skin sensation disturbance    • Spasm of back muscles    • Spinal stenosis of lumbar region          Review of Systems   Constitutional: Negative for fever.   HENT: Positive for congestion, nosebleeds, postnasal drip, rhinorrhea, sore throat, trouble swallowing and voice change.    Respiratory: Positive for wheezing.    Musculoskeletal:        Leg pain   Neurological: Positive for headaches.   All other systems reviewed and are negative.          Objective   Physical Exam   Constitutional: He is oriented to person, place, and time. He appears well-developed and well-nourished.   HENT:   Head: Normocephalic and atraumatic.   Right Ear: Hearing, tympanic membrane, external ear and ear canal normal.   Left Ear:  Hearing, tympanic membrane, external ear and ear canal normal.   Nose: Nasal deformity and septal deviation present. No mucosal edema or rhinorrhea. No epistaxis. Right sinus exhibits no maxillary sinus tenderness and no frontal sinus tenderness. Left sinus exhibits no maxillary sinus tenderness and no frontal sinus tenderness.   Mouth/Throat: Uvula is midline, oropharynx is clear and moist and mucous membranes are normal. No trismus in the jaw. Normal dentition. No oropharyngeal exudate or posterior oropharyngeal edema.   Eyes: Conjunctivae and EOM are normal. Pupils are equal, round, and reactive to light.   Neck: Normal range of motion and phonation normal. Neck supple. No JVD present. No tracheal deviation present. No thyroid mass and no thyromegaly present.       Cardiovascular: Normal rate and regular rhythm.    Pulmonary/Chest: Effort normal.   Musculoskeletal: Normal range of motion.   Lymphadenopathy:        Head (right side): No submental, no submandibular, no tonsillar, no preauricular, no posterior auricular and no occipital adenopathy present.        Head (left side): No submental, no submandibular, no tonsillar, no preauricular, no posterior auricular and no occipital adenopathy present.     He has no cervical adenopathy.        Right cervical: No superficial cervical, no deep cervical and no posterior cervical adenopathy present.       Left cervical: No superficial cervical, no deep cervical and no posterior cervical adenopathy present.   Neurological: He is alert and oriented to person, place, and time. No cranial nerve deficit.   Skin: Skin is warm.   Psychiatric: He has a normal mood and affect. His speech is normal and behavior is normal. Thought content normal.   Nursing note and vitals reviewed.            Assessment/Plan   Evangelist was seen today for sinus problem.    Diagnoses and all orders for this visit:    Deviated septum  -     CT Sinus Without Contrast; Future    Nasal turbinate  hypertrophy  -     CT Sinus Without Contrast; Future    Other chronic sinusitis  -     CT Sinus Without Contrast; Future    Other orders  -     azelastine (ASTELIN) 0.1 % nasal spray; 2 sprays into each nostril 2 (Two) Times a Day. Use in each nostril as directed  -     cefdinir (OMNICEF) 300 MG capsule; Take 1 capsule by mouth 2 (Two) Times a Day. Take with food and probiotics and call office and stop if develop watery diarrhea.        The patient for 2 weeks with antibiotics to get CT scan in follow-up.  Because of his need for use of CPAP and difficulty he still many nasal and turbinate surgery but we need to treat the sinusitis symptoms he's having as well will reevaluate after the CT explained use and side effects medication is to call for problems he's to take probiotics in the meantime

## 2018-03-21 ENCOUNTER — HOSPITAL ENCOUNTER (OUTPATIENT)
Dept: CT IMAGING | Facility: HOSPITAL | Age: 53
Discharge: HOME | End: 2018-03-21
Admitting: OTOLARYNGOLOGY

## 2018-03-21 DIAGNOSIS — J34.2 DEVIATED SEPTUM: ICD-10-CM

## 2018-03-21 DIAGNOSIS — J34.3 NASAL TURBINATE HYPERTROPHY: ICD-10-CM

## 2018-03-21 DIAGNOSIS — J32.8 OTHER CHRONIC SINUSITIS: ICD-10-CM

## 2018-03-21 PROCEDURE — 70486 CT MAXILLOFACIAL W/O DYE: CPT

## 2018-03-29 ENCOUNTER — OFFICE VISIT (OUTPATIENT)
Dept: OTOLARYNGOLOGY | Facility: CLINIC | Age: 53
End: 2018-03-29

## 2018-03-29 VITALS — WEIGHT: 211 LBS | HEIGHT: 70 IN | BODY MASS INDEX: 30.21 KG/M2 | TEMPERATURE: 97.2 F

## 2018-03-29 DIAGNOSIS — J34.89 CONCHA BULLOSA: ICD-10-CM

## 2018-03-29 DIAGNOSIS — J34.3 NASAL TURBINATE HYPERTROPHY: Primary | ICD-10-CM

## 2018-03-29 DIAGNOSIS — J34.2 DEVIATED SEPTUM: ICD-10-CM

## 2018-03-29 PROCEDURE — 99214 OFFICE O/P EST MOD 30 MIN: CPT | Performed by: OTOLARYNGOLOGY

## 2018-03-29 RX ORDER — MOMETASONE FUROATE 50 UG/1
2 SPRAY, METERED NASAL 2 TIMES DAILY
Qty: 17 G | Refills: 11 | Status: SHIPPED | OUTPATIENT
Start: 2018-03-29 | End: 2018-03-29

## 2018-03-29 RX ORDER — TRIAMCINOLONE ACETONIDE 55 UG/1
2 SPRAY, METERED NASAL DAILY
Qty: 16.5 G | Refills: 11 | Status: SHIPPED | OUTPATIENT
Start: 2018-03-29 | End: 2018-03-30

## 2018-03-29 RX ORDER — BACLOFEN 20 MG/1
TABLET ORAL
Qty: 90 TABLET | Refills: 2 | Status: SHIPPED | OUTPATIENT
Start: 2018-03-29 | End: 2018-06-27

## 2018-03-29 NOTE — PROGRESS NOTES
Subjective   Evangelist Gipson is a 52 y.o. male.   Chief complaint follow-up sinonasal symptoms  History of Present Illness     He comes back in follow-up for CT report some some pressure and trouble breathing through his nose on both sides some decreased sense of smell not have any fever chills no particular sore throat he still is using his CPAP uses nasal pillows but finds difficulty using it because of the abnormality inside his nose.  He is using both his nasal sprays but hasn't noticed a  difference in symptoms  The following portions of the patient's history were reviewed and updated as appropriate: allergies, current medications, past family history, past medical history, past social history, past surgical history and problem list.      Evangelist Gipson reports that he quit smoking about 22 years ago. He has never used smokeless tobacco. He reports that he does not drink alcohol or use drugs.  Patient is not a tobacco user and has been counseled for use of tobacco products    Family History   Problem Relation Age of Onset   • Heart disease Mother    • Osteoporosis Mother    • Lung cancer Father    • Prostate cancer Father    • Coronary artery disease Father    • COPD Father    • Hyperlipidemia Father    • Hypertension Father    • Diabetes Maternal Grandmother    • Hypertension Other    • Cancer Other    • Alcohol abuse Brother    • Coronary artery disease Brother    • COPD Brother    • Mental illness Brother          Current Outpatient Prescriptions:   •  albuterol (PROAIR RESPICLICK) 108 (90 BASE) MCG/ACT inhaler, Inhale 2 puffs every 4 (four) hours as needed for shortness of air., Disp: , Rfl:   •  amLODIPine (NORVASC) 10 MG tablet, TAKE 1 TABLET BY MOUTH DAILY., Disp: 30 tablet, Rfl: 2  •  aspirin 81 MG EC tablet, Take 81 mg by mouth Daily., Disp: , Rfl:   •  azelastine (ASTELIN) 0.1 % nasal spray, 2 sprays into each nostril 2 (Two) Times a Day. Use in each nostril as directed, Disp: 30 mL,  Rfl: 11  •  baclofen (LIORESAL) 20 MG tablet, TAKE ONE TABLET BY MOUTH THREE TIMES A DAY, Disp: 90 tablet, Rfl: 2  •  cetirizine (zyrTEC) 10 MG tablet, TAKE ONE TABLET BY MOUTH DAILY, Disp: 90 tablet, Rfl: 8  •  chlorothiazide (DIURIL) 500 MG tablet, TAKE 1 TABLET BY MOUTH EVERY MORNING., Disp: 30 tablet, Rfl: 2  •  clonazePAM (KlonoPIN) 0.5 MG tablet, Take 0.5 mg by mouth 3 (three) times a day as needed., Disp: , Rfl:   •  cyclobenzaprine (FLEXERIL) 5 MG tablet, TAKE 1-2 TABLETS BY MOUTH TWO TIMES A DAY AS NEEDED., Disp: 120 tablet, Rfl: 2  •  gabapentin (NEURONTIN) 600 MG tablet, Take 1 tablet by mouth 3 (Three) Times a Day., Disp: 90 tablet, Rfl: 2  •  ketoconazole (NIZORAL) 2 % shampoo, Apply  topically 2 (Two) Times a Week., Disp: 120 mL, Rfl: 1  •  metoprolol tartrate (LOPRESSOR) 100 MG tablet, TAKE 1 TABLET BY MOUTH 2 (TWO) TIMES A DAY., Disp: 60 tablet, Rfl: 2  •  metoprolol tartrate (LOPRESSOR) 25 MG tablet, TAKE ONE TABLET BY MOUTH TWO TIMES A DAY WITH 100MG TABLET, Disp: 60 tablet, Rfl: 2  •  montelukast (SINGULAIR) 10 MG tablet, TAKE ONE TABLET BY MOUTH AT BEDTIME, Disp: 90 tablet, Rfl: 2  •  Omega-3 Fatty Acids (FISH OIL) 1000 MG capsule capsule, Take  by mouth Daily With Breakfast., Disp: , Rfl:   •  pravastatin (PRAVACHOL) 20 MG tablet, Take 1 tablet by mouth Daily., Disp: 90 tablet, Rfl: 2  •  promethazine (PHENERGAN) 25 MG tablet, TAKE 1 TABLET BY MOUTH EVERY 6 (SIX) HOURS AS NEEDED FOR NAUSEA OR VOMITING., Disp: 30 tablet, Rfl: 2  •  tamsulosin (FLOMAX) 0.4 MG capsule 24 hr capsule, Take 1 capsule by mouth Every Night., Disp: 30 capsule, Rfl: 2  •  traMADol (ULTRAM) 50 MG tablet, Take 1 tablet by mouth Every 6 (Six) Hours As Needed for Moderate Pain ., Disp: 120 tablet, Rfl: 2  •  mometasone (NASONEX) 50 MCG/ACT nasal spray, 2 sprays into each nostril 2 (Two) Times a Day., Disp: 17 g, Rfl: 11    Allergies   Allergen Reactions   • Gelatin Anaphylaxis, Hives and Swelling   • Ambien [Zolpidem  Tartrate]    • Beef-Derived Products    • Dairy Aid [Lactase]    • Latex    • Pork-Derived Products    • Remeron [Mirtazapine]        Past Medical History:   Diagnosis Date   • Allergic contact dermatitis    • Allergic rhinitis    • Anxiety    • Anxiety    • Arthritis    • Asthma    • Asthma    • Benign lipomatous neoplasm      Small lipoma clinically right chest wall      • Chest wall tenderness     right side of sternum   • Epilepsy    • Food allergy    • [place] de la Tourette's syndrome     neurology had been seeing him in [place]      • Headache    • Hypertension    • Hypertension    • Hypertension    • Insomnia    • Internal hemorrhoids    • Low back pain    • Malaise and fatigue    • Multiple joint pain    • Need for influenza vaccination    • Neuralgia and neuritis    • Polyp of intestine     +FH, leiomyoma      • Radiculitis    • Seborrheic dermatitis    • Skin sensation disturbance    • Spasm of back muscles    • Spinal stenosis of lumbar region          Review of Systems   Constitutional: Negative for fever.   HENT: Positive for facial swelling, postnasal drip and rhinorrhea. Negative for nosebleeds.    Respiratory: Positive for apnea.    Neurological: Positive for headaches.   Hematological: Negative for adenopathy.           Objective   Physical Exam   Constitutional: He is oriented to person, place, and time. He appears well-developed and well-nourished.   HENT:   Head: Normocephalic and atraumatic.   Right Ear: Hearing, tympanic membrane, external ear and ear canal normal.   Left Ear: Hearing, tympanic membrane, external ear and ear canal normal.   Nose: Nasal deformity and septal deviation present. No mucosal edema or rhinorrhea. No epistaxis. Right sinus exhibits no maxillary sinus tenderness and no frontal sinus tenderness. Left sinus exhibits no maxillary sinus tenderness and no frontal sinus tenderness.   Mouth/Throat: Uvula is midline, oropharynx is clear and moist and mucous membranes are  normal. No trismus in the jaw. Normal dentition. No oropharyngeal exudate or posterior oropharyngeal edema.   Eyes: Conjunctivae are normal.   puffiness  around lower lids   Neck: Normal range of motion and phonation normal. Neck supple. No JVD present. No tracheal deviation present. No thyroid mass and no thyromegaly present.       Pulmonary/Chest: Effort normal.   Lymphadenopathy:        Head (right side): No submental, no submandibular, no tonsillar, no preauricular, no posterior auricular and no occipital adenopathy present.        Head (left side): No submental, no submandibular, no tonsillar, no preauricular, no posterior auricular and no occipital adenopathy present.     He has no cervical adenopathy.        Right cervical: No superficial cervical, no deep cervical and no posterior cervical adenopathy present.       Left cervical: No superficial cervical, no deep cervical and no posterior cervical adenopathy present.   Neurological: He is alert and oriented to person, place, and time. No cranial nerve deficit.   Skin: Skin is warm.   Psychiatric: He has a normal mood and affect. His speech is normal and behavior is normal. Thought content normal.   Nursing note and vitals reviewed.      Study Result        EXAMINATION:  CT sinus                    Indication: Sinusitis, <=12w, uncomplicated, J34.2 Deviated nasal  septum J34.3 Hypertrophy of nasal turbinates J32.8 Other chronic  sinusitis       History:  Correlative imaging:  CT facial bones 5/13/16        Technique:  iv contrast:  none                  This exam was performed according to the departmental  dose-optimization program which includes automated exposure  control, adjustment of the mA and/or kV according to patient size  and/or use of iterative reconstruction technique.         FINDINGS:       Sinuses:                   Frontal sinuses:  negative           Ethmoidal sinuses:  negative                Maxillary sinuses:  No evidence of an air-fluid  level. Focal  soft tissue density on the left likely a mucoid retention cyst  measuring 0.7 cm.             Sphenoidal sinuses:  negative              Nasal:    Osteomeatal units (OMU):  within normal limits               Yeni bullosa:  Present involving the middle turbinates  proximally.    Nasal septum:  1 cm rightward deviation     Misc:  (as visualized)     Orbits:  unremarkable      Mastoids:  unremarkable      Brain:  unremarkable                          IMPRESSION:  CONCLUSION:         1. 1 cm rightward deviation of the nasal septum.  2. Bilateral yeni bullosa.  3. 0.7 cm mucoid retention cyst in the left maxillary sinus.                                         Electronically signed by:  ITZEL Kim MD  3/21/2018 4:29  PM CDT Workstation: [id number]   Imaging     CT Sinus Without Contrast (Order #[id number]) on 3/21/2018 - Imaging Information   Signed by     Signed Date/Time  Phone Pager   GÓMEZ KIM 3/21/2018 16:29 [phone]    Exam Information          Assessment/Plan   Evangelist was seen today for results.    Diagnoses and all orders for this visit:    Nasal turbinate hypertrophy    Deviated septum    Yeni bullosa    Other orders  -     mometasone (NASONEX) 50 MCG/ACT nasal spray; 2 sprays into each nostril 2 (Two) Times a Day.        We had a long discussion with his wife have a CT reviewed frame by frame and multiple views.  I don't think he needs sinus surgery we'll switch his nasal spray to try and help congestion.    Scearce nasal surgery for the turbinates yeni bullosa that may help make uses CPAP procedure the short-term afterwards the more difficult to use he understands and understands the risks benefits discussed the failure rates success rate and complications  He will think about that option we'll recheck in 4 weeks and use them as other nasal spray in interim he's can look is scheduled consider surgery after that all questions were answered

## 2018-03-29 NOTE — PATIENT INSTRUCTIONS
MyPlate from [employer]  The general, healthful diet is based on the 2010 Dietary Guidelines for Americans. The amount of food you need to eat from each food group depends on your age, sex, and level of physical activity and can be individualized by a dietitian. Go to ChooseMyPlate.gov for more information.  What do I need to know about the MyPlate plan?  · Enjoy your food, but eat less.  · Avoid oversized portions.  ¨ ½ of your plate should include fruits and vegetables.  ¨ ¼ of your plate should be grains.  ¨ ¼ of your plate should be protein.  Grains   · Make at least half of your grains whole grains.  · For a 2,000 calorie daily food plan, eat 6 oz every day.  · 1 oz is about 1 slice bread, 1 cup cereal, or ½ cup cooked rice, cereal, or pasta.  Vegetables   · Make half your plate fruits and vegetables.  · For a 2,000 calorie daily food plan, eat 2½ cups every day.  · 1 cup is about 1 cup raw or cooked vegetables or vegetable juice or 2 cups raw leafy greens.  Fruits   · Make half your plate fruits and vegetables.  · For a 2,000 calorie daily food plan, eat 2 cups every day.  · 1 cup is about 1 cup fruit or 100% fruit juice or ½ cup dried fruit.  Protein   · For a 2,000 calorie daily food plan, eat 5½ oz every day.  · 1 oz is about 1 oz meat, poultry, or fish, ¼ cup cooked beans, 1 egg, 1 Tbsp peanut butter, or ½ oz nuts or seeds.  Dairy   · Switch to fat-free or low-fat (1%) milk.  · For a 2,000 calorie daily food plan, eat 3 cups every day.  · 1 cup is about 1 cup milk or yogurt or soy milk (soy beverage), 1½ oz natural cheese, or 2 oz processed cheese.  Fats, Oils, and Empty Calories   · Only small amounts of oils are recommended.  · Empty calories are calories from solid fats or added sugars.  · Compare sodium in foods like soup, bread, and frozen meals. Choose the foods with lower numbers.  · Drink water instead of sugary drinks.  What foods can I eat?  Grains   Whole grains such as whole wheat, quinoa, millet,  and bulgur. Bread, rolls, and pasta made from whole grains. Brown or wild rice. Hot or cold cereals made from whole grains and without added sugar.  Vegetables   All fresh vegetables, especially fresh red, dark green, or orange vegetables. Peas and beans. Low-sodium frozen or canned vegetables prepared without added salt. Low-sodium vegetable juices.  Fruits   All fresh, frozen, and dried fruits. Canned fruit packed in water or fruit juice without added sugar. Fruit juices without added sugar.  Meats and Other Protein Sources   Boiled, baked, or grilled lean meat trimmed of fat. Skinless poultry. Fresh seafood and shellfish. Canned seafood packed in water. Unsalted nuts and unsalted nut butters. Tofu. Dried beans and pea. Eggs.  Dairy   Low-fat or fat-free milk, yogurt, and cheeses.  Sweets and Desserts   Frozen desserts made from low-fat milk.  Fats and Oils   Olive, peanut, and canola oils and margarine. Salad dressing and mayonnaise made from these oils.  Other   Soups and casseroles made from allowed ingredients and without added fat or salt.  The items listed above may not be a complete list of recommended foods or beverages. Contact your dietitian for more options.   What foods are not recommended?  Grains   Sweetened, low-fiber cereals. Packaged baked goods. Snack crackers and chips. Cheese crackers, butter crackers, and biscuits. Frozen waffles, sweet breads, doughnuts, pastries, packaged baking mixes, pancakes, cakes, and cookies.  Vegetables   Regular canned or frozen vegetables or vegetables prepared with salt. Canned tomatoes. Canned tomato sauce. Fried vegetables. Vegetables in cream sauce or cheese sauce.  Fruits   Fruits packed in syrup or made with added sugar.  Meats and Other Protein Sources   Marbled or fatty meats such as ribs. Poultry with skin. Fried meats, poultry, eggs, or fish. Sausages, hot dogs, and deli meats such as pastrami, bologna, or salami.  Dairy   Whole milk, cream, cheeses made  from whole milk, sour cream. Ice cream or yogurt made from whole milk or with added sugar.  Beverages   For adults, no more than one alcoholic drink per day. Regular soft drinks or other sugary beverages. Juice drinks.  Sweets and Desserts   Sugary or fatty desserts, candy, and other sweets.  Fats and Oils   Solid shortening or partially hydrogenated oils. Solid margarine. Margarine that contains trans fats. Butter.  The items listed above may not be a complete list of foods and beverages to avoid. Contact your dietitian for more information.   This information is not intended to replace advice given to you by your health care provider. Make sure you discuss any questions you have with your health care provider.  Document Released: 01/06/2009 Document Revised: 05/25/2017 Document Reviewed: 11/26/2014  [employer] Patient Education © 2017 [employer]  BMI for Adults  Body mass index (BMI) is a number that is calculated from a person's weight and height. In most adults, the number is used to find how much of an adult's weight is made up of fat. BMI is not as accurate as a direct measure of body fat.  How is BMI calculated?  BMI is calculated by dividing weight in kilograms by height in meters squared. It can also be calculated by dividing weight in pounds by height in inches squared, then multiplying the resulting number by 703. Charts are available to help you find your BMI quickly and easily without doing this calculation.  How is BMI interpreted?  Health care professionals use BMI charts to identify whether an adult is underweight, at a normal weight, or overweight based on the following guidelines:  · Underweight: BMI less than 18.5.  · Normal weight: BMI between 18.5 and 24.9.  · Overweight: BMI between 25 and 29.9.  · Obese: BMI of 30 and above.  BMI is usually interpreted the same for males and females.  Weight includes both fat and muscle, so someone with a muscular build, such as an athlete, may  have a BMI that is higher than 24.9. In cases like these, BMI may not accurately depict body fat. To determine if excess body fat is the cause of a BMI of 25 or higher, further assessments may need to be done by a health care provider.  Why is BMI a useful tool?  BMI is used to identify a possible weight problem that may be related to a medical problem or may increase the risk for medical problems. BMI can also be used to promote changes to reach a healthy weight.  This information is not intended to replace advice given to you by your health care provider. Make sure you discuss any questions you have with your health care provider.  Document Released: 08/29/2005 Document Revised: 04/27/2017 Document Reviewed: 05/15/2015  [employer] Interactive Patient Education © 2017 [employer]

## 2018-03-30 DIAGNOSIS — J34.3 NASAL TURBINATE HYPERTROPHY: Primary | ICD-10-CM

## 2018-03-30 DIAGNOSIS — J34.89 CONCHA BULLOSA: ICD-10-CM

## 2018-03-30 DIAGNOSIS — J34.2 DEVIATED NASAL SEPTUM: ICD-10-CM

## 2018-03-30 RX ORDER — MOMETASONE FUROATE 50 UG/1
2 SPRAY, METERED NASAL 2 TIMES DAILY
Qty: 17 G | Refills: 11 | Status: SHIPPED | OUTPATIENT
Start: 2018-03-30 | End: 2018-07-03 | Stop reason: HOSPADM

## 2018-04-06 DIAGNOSIS — E78.5 HYPERLIPIDEMIA, UNSPECIFIED HYPERLIPIDEMIA TYPE: ICD-10-CM

## 2018-04-06 RX ORDER — PRAVASTATIN SODIUM 20 MG
20 TABLET ORAL DAILY
Qty: 90 TABLET | Refills: 2 | Status: SHIPPED | OUTPATIENT
Start: 2018-04-06 | End: 2019-01-29

## 2018-04-11 RX ORDER — CYCLOBENZAPRINE HCL 5 MG
TABLET ORAL
Qty: 120 TABLET | Refills: 2 | Status: SHIPPED | OUTPATIENT
Start: 2018-04-11 | End: 2018-06-27

## 2018-04-13 RX ORDER — PROMETHAZINE HYDROCHLORIDE 25 MG/1
TABLET ORAL
Qty: 30 TABLET | Refills: 2 | Status: SHIPPED | OUTPATIENT
Start: 2018-04-13 | End: 2018-06-27

## 2018-04-20 NOTE — TELEPHONE ENCOUNTER
Lab Results & recommendations relayed in a letter.     ----- Message from Ashley Watts MD sent at 1/13/2017  8:06 AM CST -----  Please let him know that labs look good and hep c negative.    
oral

## 2018-04-25 RX ORDER — ALBUTEROL SULFATE 90 UG/1
AEROSOL, METERED RESPIRATORY (INHALATION)
Qty: 18 G | Refills: 2 | Status: SHIPPED | OUTPATIENT
Start: 2018-04-25 | End: 2018-06-27

## 2018-04-26 RX ORDER — TAMSULOSIN HYDROCHLORIDE 0.4 MG/1
CAPSULE ORAL
Qty: 30 CAPSULE | Refills: 2 | Status: SHIPPED | OUTPATIENT
Start: 2018-04-26 | End: 2018-05-02 | Stop reason: SDUPTHER

## 2018-05-02 ENCOUNTER — OFFICE VISIT (OUTPATIENT)
Dept: FAMILY MEDICINE CLINIC | Facility: CLINIC | Age: 53
End: 2018-05-02

## 2018-05-02 VITALS
HEIGHT: 70 IN | DIASTOLIC BLOOD PRESSURE: 84 MMHG | BODY MASS INDEX: 30.42 KG/M2 | WEIGHT: 212.5 LBS | SYSTOLIC BLOOD PRESSURE: 124 MMHG

## 2018-05-02 DIAGNOSIS — I10 ESSENTIAL HYPERTENSION: Primary | ICD-10-CM

## 2018-05-02 DIAGNOSIS — M48.062 SPINAL STENOSIS OF LUMBAR REGION WITH NEUROGENIC CLAUDICATION: ICD-10-CM

## 2018-05-02 PROCEDURE — 99213 OFFICE O/P EST LOW 20 MIN: CPT | Performed by: FAMILY MEDICINE

## 2018-05-02 RX ORDER — TRAMADOL HYDROCHLORIDE 50 MG/1
50 TABLET ORAL EVERY 6 HOURS PRN
Qty: 120 TABLET | Refills: 2 | Status: SHIPPED | OUTPATIENT
Start: 2018-05-02 | End: 2018-06-27

## 2018-05-02 RX ORDER — GABAPENTIN 600 MG/1
600 TABLET ORAL 3 TIMES DAILY
Qty: 90 TABLET | Refills: 2 | Status: SHIPPED | OUTPATIENT
Start: 2018-05-02

## 2018-05-02 NOTE — PROGRESS NOTES
Subjective   Evangelist Gipson is a 52 y.o. male.     Hypertension   This is a chronic problem. The current episode started more than 1 year ago. The problem is unchanged. The problem is controlled. Associated symptoms include headaches. Pertinent negatives include no anxiety, blurred vision, chest pain, malaise/fatigue, neck pain, orthopnea, palpitations, peripheral edema, PND, shortness of breath or sweats. There are no associated agents to hypertension. Current antihypertension treatment includes beta blockers, calcium channel blockers and alpha 1 blockers. The current treatment provides moderate improvement. There are no compliance problems.  There is no history of angina, kidney disease, CAD/MI, CVA, heart failure, left ventricular hypertrophy, PVD or retinopathy.      He has been on tramadol and gabapentin for his back pain. He has spinal stenosis. He would like to come off those medications because they are controlled.  He had been seeing a neurosurgeon in [place] and they were trying to avoid surgery if possible.  He feels that his pain is still there daily but the medication allows him to function.        Current Outpatient Prescriptions:   •  albuterol (PROAIR RESPICLICK) 108 (90 BASE) MCG/ACT inhaler, Inhale 2 puffs every 4 (four) hours as needed for shortness of air., Disp: , Rfl:   •  amLODIPine (NORVASC) 10 MG tablet, TAKE 1 TABLET BY MOUTH DAILY., Disp: 30 tablet, Rfl: 2  •  aspirin 81 MG EC tablet, Take 81 mg by mouth Daily., Disp: , Rfl:   •  azelastine (ASTELIN) 0.1 % nasal spray, 2 sprays into each nostril 2 (Two) Times a Day. Use in each nostril as directed, Disp: 30 mL, Rfl: 11  •  baclofen (LIORESAL) 20 MG tablet, TAKE ONE TABLET BY MOUTH THREE TIMES A DAY, Disp: 90 tablet, Rfl: 2  •  cetirizine (zyrTEC) 10 MG tablet, TAKE ONE TABLET BY MOUTH DAILY, Disp: 90 tablet, Rfl: 8  •  chlorothiazide (DIURIL) 500 MG tablet, TAKE 1 TABLET BY MOUTH EVERY MORNING., Disp: 30 tablet, Rfl: 2  •   clonazePAM (KlonoPIN) 0.5 MG tablet, Take 0.5 mg by mouth 3 (three) times a day as needed., Disp: , Rfl:   •  cyclobenzaprine (FLEXERIL) 5 MG tablet, TAKE 1-2 TABLETS BY MOUTH TWO TIMES A DAY AS NEEDED., Disp: 120 tablet, Rfl: 2  •  gabapentin (NEURONTIN) 600 MG tablet, Take 1 tablet by mouth 3 (Three) Times a Day., Disp: 90 tablet, Rfl: 2  •  ketoconazole (NIZORAL) 2 % shampoo, Apply  topically 2 (Two) Times a Week., Disp: 120 mL, Rfl: 1  •  metoprolol tartrate (LOPRESSOR) 100 MG tablet, TAKE 1 TABLET BY MOUTH 2 (TWO) TIMES A DAY., Disp: 60 tablet, Rfl: 2  •  metoprolol tartrate (LOPRESSOR) 25 MG tablet, TAKE ONE TABLET BY MOUTH TWO TIMES A DAY WITH 100MG TABLET, Disp: 60 tablet, Rfl: 2  •  mometasone (NASONEX) 50 MCG/ACT nasal spray, 2 sprays into each nostril 2 (Two) Times a Day., Disp: 17 g, Rfl: 11  •  montelukast (SINGULAIR) 10 MG tablet, TAKE ONE TABLET BY MOUTH AT BEDTIME, Disp: 90 tablet, Rfl: 2  •  Omega-3 Fatty Acids (FISH OIL) 1000 MG capsule capsule, Take  by mouth Daily With Breakfast., Disp: , Rfl:   •  pravastatin (PRAVACHOL) 20 MG tablet, TAKE 1 TABLET BY MOUTH DAILY., Disp: 90 tablet, Rfl: 2  •  promethazine (PHENERGAN) 25 MG tablet, TAKE 1 TABLET BY MOUTH EVERY 6 (SIX) HOURS AS NEEDED FOR NAUSEA OR VOMITING., Disp: 30 tablet, Rfl: 2  •  tamsulosin (FLOMAX) 0.4 MG capsule 24 hr capsule, Take 1 capsule by mouth Every Night., Disp: 30 capsule, Rfl: 2  •  traMADol (ULTRAM) 50 MG tablet, Take 1 tablet by mouth Every 6 (Six) Hours As Needed for Moderate Pain ., Disp: 120 tablet, Rfl: 2  •  VENTOLIN  (90 Base) MCG/ACT inhaler, INHALE 2 PUFFS BY MOUTH EVERY 4-6 HOURS AS NEEDED, Disp: 18 g, Rfl: 2    The following portions of the patient's history were reviewed and updated as appropriate: allergies, current medications, past family history, past medical history, past social history, past surgical history and problem list.    Review of Systems   Constitutional: Negative for activity change, appetite  "change, fatigue, malaise/fatigue and unexpected weight change.   Eyes: Negative for blurred vision and visual disturbance.   Respiratory: Negative for cough, shortness of breath and wheezing.    Cardiovascular: Negative for chest pain, palpitations, orthopnea, leg swelling and PND.   Gastrointestinal: Negative for abdominal distention, abdominal pain, constipation, diarrhea, nausea and vomiting.   Musculoskeletal: Positive for arthralgias and back pain. Negative for gait problem, joint swelling and neck pain.   Skin: Negative for pallor, rash and wound.   Neurological: Positive for weakness, numbness and headaches.   Psychiatric/Behavioral: Negative for dysphoric mood and sleep disturbance. The patient is not nervous/anxious.        Objective    Vitals:    05/02/18 1253   BP: 124/84   Weight: 96.4 kg (212 lb 8 oz)   Height: 177.8 cm (70\")       Physical Exam   Constitutional: He is oriented to person, place, and time. He appears well-developed and well-nourished. No distress.   Cardiovascular: Normal rate, regular rhythm and normal heart sounds.    No murmur heard.  No LE edema.   Pulmonary/Chest: Effort normal and breath sounds normal. No respiratory distress.   Abdominal: Soft. Bowel sounds are normal. He exhibits no distension. There is no tenderness.   Musculoskeletal:        Lumbar back: He exhibits pain and spasm. He exhibits normal range of motion.   Neurological: He is alert and oriented to person, place, and time.   Psychiatric: He has a normal mood and affect. His behavior is normal. Judgment and thought content normal.   Nursing note and vitals reviewed.      Assessment/Plan   Problems Addressed this Visit        Cardiovascular and Mediastinum    Hypertension - Primary       Other    Spinal stenosis of lumbar region    Relevant Medications    traMADol (ULTRAM) 50 MG tablet      Other Visit Diagnoses    None.       1.) HTN-  He has been doing better and current medication working well. Will continue. " Continue to monitor at home.  Will recheck labs when he returns.  2.) Spinal Stenosis with radiculopathy-  Continue tramadol and gabapentin. UDS reviewed and will repeat at next appointment.  The patient has read and signed the [facility] Controlled Substance Contract.  I will continue to see patient for regular follow up appointments.  They are well controlled on their medication.  JUDITH has been reviewed by me and is updated every 3 months. The patient is aware of the potential for addiction and dependence.  RTC in 3 months or sooner PRN

## 2018-05-04 RX ORDER — CHLOROTHIAZIDE 500 MG
TABLET ORAL
Qty: 30 TABLET | Refills: 2 | Status: SHIPPED | OUTPATIENT
Start: 2018-05-04 | End: 2018-06-27

## 2018-05-11 ENCOUNTER — OFFICE VISIT (OUTPATIENT)
Dept: OTOLARYNGOLOGY | Facility: CLINIC | Age: 53
End: 2018-05-11

## 2018-05-11 VITALS — TEMPERATURE: 98.3 F | HEIGHT: 70 IN | BODY MASS INDEX: 30.49 KG/M2 | WEIGHT: 213 LBS

## 2018-05-11 DIAGNOSIS — J34.2 DEVIATED NASAL SEPTUM: Primary | ICD-10-CM

## 2018-05-11 DIAGNOSIS — J34.3 NASAL TURBINATE HYPERTROPHY: ICD-10-CM

## 2018-05-11 DIAGNOSIS — J34.89 CONCHA BULLOSA: ICD-10-CM

## 2018-05-11 PROCEDURE — 99214 OFFICE O/P EST MOD 30 MIN: CPT | Performed by: OTOLARYNGOLOGY

## 2018-05-11 RX ORDER — AZELASTINE 1 MG/ML
2 SPRAY, METERED NASAL 2 TIMES DAILY
Qty: 30 ML | Refills: 11 | Status: SHIPPED | OUTPATIENT
Start: 2018-05-11 | End: 2018-07-03 | Stop reason: HOSPADM

## 2018-05-11 NOTE — PATIENT INSTRUCTIONS

## 2018-05-11 NOTE — PROGRESS NOTES
Subjective   Eavngelist Gipson is a 52 y.o. male.   CC; f/u nasal obst    History of Present Illness   Patient presents CT scan documenting the septum and turbinate hypertrophy yeni bullosa is run out of his Astelin spray didn't notice improvement with the Nasonex and Astelin.  Not had any facial pain fever headaches for sore throat    Vibrations when he uses his CPAP  The following portions of the patient's history were reviewed and updated as appropriate: allergies, current medications, past family history, past medical history, past social history, past surgical history and problem list.      Current Outpatient Prescriptions:   •  albuterol (PROAIR RESPICLICK) 108 (90 BASE) MCG/ACT inhaler, Inhale 2 puffs every 4 (four) hours as needed for shortness of air., Disp: , Rfl:   •  amLODIPine (NORVASC) 10 MG tablet, TAKE 1 TABLET BY MOUTH DAILY., Disp: 30 tablet, Rfl: 2  •  aspirin 81 MG EC tablet, Take 81 mg by mouth Daily., Disp: , Rfl:   •  azelastine (ASTELIN) 0.1 % nasal spray, 2 sprays into each nostril 2 (Two) Times a Day. Use in each nostril as directed, Disp: 30 mL, Rfl: 11  •  baclofen (LIORESAL) 20 MG tablet, TAKE ONE TABLET BY MOUTH THREE TIMES A DAY, Disp: 90 tablet, Rfl: 2  •  cetirizine (zyrTEC) 10 MG tablet, TAKE ONE TABLET BY MOUTH DAILY, Disp: 90 tablet, Rfl: 8  •  chlorothiazide (DIURIL) 500 MG tablet, TAKE 1 TABLET BY MOUTH EVERY MORNING., Disp: 30 tablet, Rfl: 2  •  clonazePAM (KlonoPIN) 0.5 MG tablet, Take 0.5 mg by mouth 3 (three) times a day as needed., Disp: , Rfl:   •  cyclobenzaprine (FLEXERIL) 5 MG tablet, TAKE 1-2 TABLETS BY MOUTH TWO TIMES A DAY AS NEEDED., Disp: 120 tablet, Rfl: 2  •  gabapentin (NEURONTIN) 600 MG tablet, Take 1 tablet by mouth 3 (Three) Times a Day., Disp: 90 tablet, Rfl: 2  •  ketoconazole (NIZORAL) 2 % shampoo, Apply  topically 2 (Two) Times a Week., Disp: 120 mL, Rfl: 1  •  metoprolol tartrate (LOPRESSOR) 100 MG tablet, TAKE 1 TABLET BY MOUTH 2 (TWO) TIMES A  DAY., Disp: 60 tablet, Rfl: 2  •  metoprolol tartrate (LOPRESSOR) 25 MG tablet, TAKE ONE TABLET BY MOUTH TWO TIMES A DAY WITH 100MG TABLET, Disp: 60 tablet, Rfl: 2  •  mometasone (NASONEX) 50 MCG/ACT nasal spray, 2 sprays into each nostril 2 (Two) Times a Day., Disp: 17 g, Rfl: 11  •  montelukast (SINGULAIR) 10 MG tablet, TAKE ONE TABLET BY MOUTH AT BEDTIME, Disp: 90 tablet, Rfl: 2  •  Omega-3 Fatty Acids (FISH OIL) 1000 MG capsule capsule, Take  by mouth Daily With Breakfast., Disp: , Rfl:   •  pravastatin (PRAVACHOL) 20 MG tablet, TAKE 1 TABLET BY MOUTH DAILY., Disp: 90 tablet, Rfl: 2  •  promethazine (PHENERGAN) 25 MG tablet, TAKE 1 TABLET BY MOUTH EVERY 6 (SIX) HOURS AS NEEDED FOR NAUSEA OR VOMITING., Disp: 30 tablet, Rfl: 2  •  tamsulosin (FLOMAX) 0.4 MG capsule 24 hr capsule, Take 1 capsule by mouth Every Night., Disp: 30 capsule, Rfl: 2  •  traMADol (ULTRAM) 50 MG tablet, Take 1 tablet by mouth Every 6 (Six) Hours As Needed for Moderate Pain ., Disp: 120 tablet, Rfl: 2  •  VENTOLIN  (90 Base) MCG/ACT inhaler, INHALE 2 PUFFS BY MOUTH EVERY 4-6 HOURS AS NEEDED, Disp: 18 g, Rfl: 2    Allergies   Allergen Reactions   • Gelatin Anaphylaxis, Hives and Swelling   • Ambien [Zolpidem Tartrate]    • Beef-Derived Products    • Dairy Aid [Lactase]    • Latex    • Pork-Derived Products    • Remeron [Mirtazapine]              Review of Systems   Constitutional: Negative for fever.   HENT: Positive for congestion. Negative for facial swelling and sinus pressure.    Respiratory: Positive for apnea.    Hematological: Negative for adenopathy.           Objective   Physical Exam   Constitutional: He is oriented to person, place, and time. He appears well-developed and well-nourished.   HENT:   Head: Normocephalic and atraumatic.   Right Ear: Hearing, tympanic membrane, external ear and ear canal normal.   Left Ear: Hearing, tympanic membrane, external ear and ear canal normal.   Nose: Mucosal edema, nasal deformity and  septal deviation present. No rhinorrhea. No epistaxis. Right sinus exhibits no maxillary sinus tenderness and no frontal sinus tenderness. Left sinus exhibits no maxillary sinus tenderness and no frontal sinus tenderness.   Mouth/Throat: Uvula is midline, oropharynx is clear and moist and mucous membranes are normal. No trismus in the jaw. Normal dentition. No oropharyngeal exudate or posterior oropharyngeal edema.   Eyes: Conjunctivae are normal.   puffiness  around lower lids   Neck: Normal range of motion and phonation normal. Neck supple. No JVD present. No tracheal deviation present. No thyroid mass and no thyromegaly present.       Pulmonary/Chest: Effort normal.   Lymphadenopathy:        Head (right side): No submental, no submandibular, no tonsillar, no preauricular, no posterior auricular and no occipital adenopathy present.        Head (left side): No submental, no submandibular, no tonsillar, no preauricular, no posterior auricular and no occipital adenopathy present.     He has no cervical adenopathy.        Right cervical: No superficial cervical, no deep cervical and no posterior cervical adenopathy present.       Left cervical: No superficial cervical, no deep cervical and no posterior cervical adenopathy present.   Neurological: He is alert and oriented to person, place, and time. No cranial nerve deficit.   Skin: Skin is warm.   Psychiatric: He has a normal mood and affect. His speech is normal and behavior is normal. Thought content normal.   Nursing note and vitals reviewed.  EXAMINATION:  CT sinus                    Indication: Sinusitis, <=12w, uncomplicated, J34.2 Deviated nasal  septum J34.3 Hypertrophy of nasal turbinates J32.8 Other chronic  sinusitis       History:  Correlative imaging:  CT facial bones 5/13/16        Technique:  iv contrast:  none                  This exam was performed according to the departmental  dose-optimization program which includes automated exposure  control,  adjustment of the mA and/or kV according to patient size  and/or use of iterative reconstruction technique.         FINDINGS:       Sinuses:                   Frontal sinuses:  negative           Ethmoidal sinuses:  negative                Maxillary sinuses:  No evidence of an air-fluid level. Focal  soft tissue density on the left likely a mucoid retention cyst  measuring 0.7 cm.             Sphenoidal sinuses:  negative              Nasal:    Osteomeatal units (OMU):  within normal limits               Yeni bullosa:  Present involving the middle turbinates  proximally.    Nasal septum:  1 cm rightward deviation     Misc:  (as visualized)     Orbits:  unremarkable      Mastoids:  unremarkable      Brain:  unremarkable                          IMPRESSION:  CONCLUSION:         1. 1 cm rightward deviation of the nasal septum.  2. Bilateral yeni bullosa.  3. 0.7 cm mucoid retention cyst in the left maxillary sinus.                      Assessment/Plan   Evangelist was seen today for follow-up.    Diagnoses and all orders for this visit:    Deviated nasal septum    Nasal turbinate hypertrophy    Yeni bullosa    Other orders  -     azelastine (ASTELIN) 0.1 % nasal spray; 2 sprays into each nostril 2 (Two) Times a Day. Use in each nostril as directed      We'll continue nasal  I refilled his Astelin.  Also given information on nasal saline irrigation discussed the technique and proper use of both types of sprays Dr. drake about surgeries leaned towards that medicine she knows some improvement he was to wait longer.  We discussed risks benefits failure rates success rate and tone is no guarantee as to help symptoms she still could have to use medications lifelong is can decide and think about it in follow-up about a month

## 2018-05-23 DIAGNOSIS — I10 ESSENTIAL HYPERTENSION: ICD-10-CM

## 2018-05-23 RX ORDER — AMLODIPINE BESYLATE 10 MG/1
TABLET ORAL
Qty: 30 TABLET | Refills: 2 | Status: SHIPPED | OUTPATIENT
Start: 2018-05-23 | End: 2018-06-27

## 2018-05-23 RX ORDER — METOPROLOL TARTRATE 100 MG/1
TABLET ORAL
Qty: 60 TABLET | Refills: 2 | Status: SHIPPED | OUTPATIENT
Start: 2018-05-23 | End: 2018-06-27

## 2018-06-07 ENCOUNTER — PREP FOR SURGERY (OUTPATIENT)
Dept: OTHER | Facility: HOSPITAL | Age: 53
End: 2018-06-07

## 2018-06-07 ENCOUNTER — OFFICE VISIT (OUTPATIENT)
Dept: OTOLARYNGOLOGY | Facility: CLINIC | Age: 53
End: 2018-06-07

## 2018-06-07 VITALS — TEMPERATURE: 98.2 F | BODY MASS INDEX: 31.12 KG/M2 | HEIGHT: 70 IN | WEIGHT: 217.4 LBS

## 2018-06-07 DIAGNOSIS — J34.3 NASAL TURBINATE HYPERTROPHY: Primary | ICD-10-CM

## 2018-06-07 DIAGNOSIS — J34.89 CONCHA BULLOSA: ICD-10-CM

## 2018-06-07 DIAGNOSIS — J34.89 NASAL OBSTRUCTION: Primary | ICD-10-CM

## 2018-06-07 DIAGNOSIS — J34.2 DEVIATED NASAL SEPTUM: ICD-10-CM

## 2018-06-07 DIAGNOSIS — J34.89 NASAL VALVE BLOCKAGE: ICD-10-CM

## 2018-06-07 PROCEDURE — 99214 OFFICE O/P EST MOD 30 MIN: CPT | Performed by: OTOLARYNGOLOGY

## 2018-06-07 NOTE — PATIENT INSTRUCTIONS

## 2018-06-08 NOTE — PROGRESS NOTES
Subjective   Evangelist Gipson is a 52 y.o. male.   CCf/u nasal obstruction  History of Present Illness   Signed trouble breathing through his nose on both sides congested some pressure does not have any fever chills some drainage not severe has multiple other medical problems she says her recently controlled any significant response to use of the nasal sprays terms of helping his symptoms      The following portions of the patient's history were reviewed and updated as appropriate: allergies, current medications, past family history, past medical history, past social history, past surgical history and problem list.      Evangelist Gipson reports that he quit smoking about 22 years ago. He has never used smokeless tobacco. He reports that he does not drink alcohol or use drugs.  Patient is not a tobacco user and has been counseled for use of tobacco products    Family History   Problem Relation Age of Onset   • Heart disease Mother    • Osteoporosis Mother    • Lung cancer Father    • Prostate cancer Father    • Coronary artery disease Father    • COPD Father    • Hyperlipidemia Father    • Hypertension Father    • Diabetes Maternal Grandmother    • Hypertension Other    • Cancer Other    • Alcohol abuse Brother    • Coronary artery disease Brother    • COPD Brother    • Mental illness Brother          Current Outpatient Prescriptions:   •  albuterol (PROAIR RESPICLICK) 108 (90 BASE) MCG/ACT inhaler, Inhale 2 puffs every 4 (four) hours as needed for shortness of air., Disp: , Rfl:   •  amLODIPine (NORVASC) 10 MG tablet, TAKE 1 TABLET BY MOUTH DAILY., Disp: 30 tablet, Rfl: 2  •  aspirin 81 MG EC tablet, Take 81 mg by mouth Daily., Disp: , Rfl:   •  azelastine (ASTELIN) 0.1 % nasal spray, 2 sprays into each nostril 2 (Two) Times a Day. Use in each nostril as directed, Disp: 30 mL, Rfl: 11  •  baclofen (LIORESAL) 20 MG tablet, TAKE ONE TABLET BY MOUTH THREE TIMES A DAY, Disp: 90 tablet, Rfl: 2  •  cetirizine  (zyrTEC) 10 MG tablet, TAKE ONE TABLET BY MOUTH DAILY, Disp: 90 tablet, Rfl: 8  •  chlorothiazide (DIURIL) 500 MG tablet, TAKE 1 TABLET BY MOUTH EVERY MORNING., Disp: 30 tablet, Rfl: 2  •  clonazePAM (KlonoPIN) 0.5 MG tablet, Take 0.5 mg by mouth 3 (three) times a day as needed., Disp: , Rfl:   •  cyclobenzaprine (FLEXERIL) 5 MG tablet, TAKE 1-2 TABLETS BY MOUTH TWO TIMES A DAY AS NEEDED., Disp: 120 tablet, Rfl: 2  •  gabapentin (NEURONTIN) 600 MG tablet, Take 1 tablet by mouth 3 (Three) Times a Day., Disp: 90 tablet, Rfl: 2  •  ketoconazole (NIZORAL) 2 % shampoo, Apply  topically 2 (Two) Times a Week., Disp: 120 mL, Rfl: 1  •  metoprolol tartrate (LOPRESSOR) 100 MG tablet, TAKE 1 TABLET BY MOUTH 2 (TWO) TIMES A DAY., Disp: 60 tablet, Rfl: 2  •  metoprolol tartrate (LOPRESSOR) 25 MG tablet, TAKE ONE TABLET BY MOUTH TWO TIMES A DAY WITH 100MG TABLET, Disp: 60 tablet, Rfl: 2  •  mometasone (NASONEX) 50 MCG/ACT nasal spray, 2 sprays into each nostril 2 (Two) Times a Day., Disp: 17 g, Rfl: 11  •  montelukast (SINGULAIR) 10 MG tablet, TAKE ONE TABLET BY MOUTH AT BEDTIME, Disp: 90 tablet, Rfl: 2  •  Omega-3 Fatty Acids (FISH OIL) 1000 MG capsule capsule, Take  by mouth Daily With Breakfast., Disp: , Rfl:   •  pravastatin (PRAVACHOL) 20 MG tablet, TAKE 1 TABLET BY MOUTH DAILY., Disp: 90 tablet, Rfl: 2  •  promethazine (PHENERGAN) 25 MG tablet, TAKE 1 TABLET BY MOUTH EVERY 6 (SIX) HOURS AS NEEDED FOR NAUSEA OR VOMITING., Disp: 30 tablet, Rfl: 2  •  tamsulosin (FLOMAX) 0.4 MG capsule 24 hr capsule, Take 1 capsule by mouth Every Night., Disp: 30 capsule, Rfl: 2  •  traMADol (ULTRAM) 50 MG tablet, Take 1 tablet by mouth Every 6 (Six) Hours As Needed for Moderate Pain ., Disp: 120 tablet, Rfl: 2  •  VENTOLIN  (90 Base) MCG/ACT inhaler, INHALE 2 PUFFS BY MOUTH EVERY 4-6 HOURS AS NEEDED, Disp: 18 g, Rfl: 2    Allergies   Allergen Reactions   • Gelatin Anaphylaxis, Hives and Swelling   • Ambien [Zolpidem Tartrate]    •  Beef-Derived Products    • Dairy Aid [Lactase]    • Latex    • Pork-Derived Products    • Remeron [Mirtazapine]        Past Medical History:   Diagnosis Date   • Allergic contact dermatitis    • Allergic rhinitis    • Anxiety    • Anxiety    • Arthritis    • Asthma    • Asthma    • Benign lipomatous neoplasm      Small lipoma clinically right chest wall      • Chest wall tenderness     right side of sternum   • Epilepsy    • Food allergy    • [place] de la Tourette's syndrome     neurology had been seeing him in [place]      • Headache    • Hypertension    • Hypertension    • Hypertension    • Insomnia    • Internal hemorrhoids    • Low back pain    • Malaise and fatigue    • Multiple joint pain    • Need for influenza vaccination    • Neuralgia and neuritis    • Polyp of intestine     +FH, leiomyoma      • Radiculitis    • Seborrheic dermatitis    • Skin sensation disturbance    • Spasm of back muscles    • Spinal stenosis of lumbar region          Review of Systems   Constitutional: Negative for fever.   HENT: Positive for congestion and postnasal drip. Negative for facial swelling, nosebleeds and sore throat.    Hematological: Negative for adenopathy.           Objective   Physical Exam   Constitutional: He is oriented to person, place, and time. He appears well-developed and well-nourished.   HENT:   Head: Normocephalic and atraumatic.   Right Ear: Hearing, tympanic membrane, external ear and ear canal normal.   Left Ear: Hearing, tympanic membrane, external ear and ear canal normal.   Nose: Mucosal edema, nasal deformity and septal deviation present. No rhinorrhea. No epistaxis. Right sinus exhibits no maxillary sinus tenderness and no frontal sinus tenderness. Left sinus exhibits no maxillary sinus tenderness and no frontal sinus tenderness.       Mouth/Throat: Uvula is midline and oropharynx is clear and moist. No trismus in the jaw. Normal dentition. No oropharyngeal exudate or posterior oropharyngeal  edema.   Eyes: Conjunctivae and EOM are normal.   Neck: Normal range of motion. Neck supple. No JVD present. No tracheal deviation present. No thyromegaly present.   Pulmonary/Chest: Effort normal.   Musculoskeletal: Normal range of motion.   Lymphadenopathy:        Head (right side): No submental, no submandibular, no tonsillar, no preauricular, no posterior auricular and no occipital adenopathy present.        Head (left side): No submental, no submandibular, no tonsillar, no preauricular, no posterior auricular and no occipital adenopathy present.     He has no cervical adenopathy.        Right cervical: No superficial cervical, no deep cervical and no posterior cervical adenopathy present.       Left cervical: No superficial cervical, no deep cervical and no posterior cervical adenopathy present.   Neurological: He is alert and oriented to person, place, and time. No cranial nerve deficit.   Skin: Skin is warm.   Psychiatric: He has a normal mood and affect. His speech is normal and behavior is normal. Thought content normal.   Nursing note and vitals reviewed.      Result        EXAMINATION:  CT sinus                    Indication: Sinusitis, <=12w, uncomplicated, J34.2 Deviated nasal  septum J34.3 Hypertrophy of nasal turbinates J32.8 Other chronic  sinusitis       History:  Correlative imaging:  CT facial bones 5/13/16        Technique:  iv contrast:  none                  This exam was performed according to the departmental  dose-optimization program which includes automated exposure  control, adjustment of the mA and/or kV according to patient size  and/or use of iterative reconstruction technique.         FINDINGS:       Sinuses:                   Frontal sinuses:  negative           Ethmoidal sinuses:  negative                Maxillary sinuses:  No evidence of an air-fluid level. Focal  soft tissue density on the left likely a mucoid retention cyst  measuring 0.7 cm.             Sphenoidal sinuses:   negative              Nasal:    Osteomeatal units (OMU):  within normal limits               Yeni bullosa:  Present involving the middle turbinates  proximally.    Nasal septum:  1 cm rightward deviation     Misc:  (as visualized)     Orbits:  unremarkable      Mastoids:  unremarkable      Brain:  unremarkable                          IMPRESSION:  CONCLUSION:         1. 1 cm rightward deviation of the nasal septum.  2. Bilateral yeni bullosa.  3. 0.7 cm mucoid retention cyst in the left maxillary sinus.                                         Electronically signed by:  ITZEL Yeboah MD  3/21/2018 4:29  PM CDT Workstation           Assessment/Plan   Evangelist was seen today for follow-up.    Diagnoses and all orders for this visit:    Nasal turbinate hypertrophy    Deviated nasal septum    Yeni bullosa    Nasal valve blockage      Discuss surgery there is no guarantee that symptoms resolved discussed recovery limitations complications failure rates success rate sense of smell could be worsened change in appearance concur with her scarring her activity other surgeries complications current results were as ideal.  Discussed limitations after surgery recovering pain discomfort: Based on the CT findings I don't think he needs sinus surgery or nasal issue.  Since she has not responded well to nasal sprays and was to consider discussed with general local anesthesia with sedation U Emily talk to the anesthesiologist  .  All questions were answered and he is agreement this approach and all questions were answered his wife is well consent was obtained

## 2018-06-27 ENCOUNTER — ANESTHESIA EVENT (OUTPATIENT)
Dept: PERIOP | Facility: HOSPITAL | Age: 53
End: 2018-06-27

## 2018-06-27 ENCOUNTER — APPOINTMENT (OUTPATIENT)
Dept: PREADMISSION TESTING | Facility: HOSPITAL | Age: 53
End: 2018-06-27

## 2018-06-27 VITALS
HEIGHT: 70 IN | SYSTOLIC BLOOD PRESSURE: 130 MMHG | BODY MASS INDEX: 30.31 KG/M2 | RESPIRATION RATE: 14 BRPM | HEART RATE: 66 BPM | DIASTOLIC BLOOD PRESSURE: 73 MMHG | WEIGHT: 211.69 LBS | OXYGEN SATURATION: 97 %

## 2018-06-27 LAB
ANION GAP SERPL CALCULATED.3IONS-SCNC: 12 MMOL/L (ref 5–15)
BUN BLD-MCNC: 11 MG/DL (ref 7–21)
BUN/CREAT SERPL: 10.8 (ref 7–25)
CALCIUM SPEC-SCNC: 9.7 MG/DL (ref 8.4–10.2)
CHLORIDE SERPL-SCNC: 103 MMOL/L (ref 95–110)
CO2 SERPL-SCNC: 27 MMOL/L (ref 22–31)
CREAT BLD-MCNC: 1.02 MG/DL (ref 0.7–1.3)
GFR SERPL CREATININE-BSD FRML MDRD: 76 ML/MIN/1.73 (ref 56–130)
GLUCOSE BLD-MCNC: 79 MG/DL (ref 60–100)
POTASSIUM BLD-SCNC: 3.6 MMOL/L (ref 3.5–5.1)
SODIUM BLD-SCNC: 142 MMOL/L (ref 137–145)

## 2018-06-27 PROCEDURE — 93010 ELECTROCARDIOGRAM REPORT: CPT | Performed by: INTERNAL MEDICINE

## 2018-06-27 PROCEDURE — 36415 COLL VENOUS BLD VENIPUNCTURE: CPT

## 2018-06-27 PROCEDURE — 93005 ELECTROCARDIOGRAM TRACING: CPT

## 2018-06-27 PROCEDURE — 80048 BASIC METABOLIC PNL TOTAL CA: CPT | Performed by: ANESTHESIOLOGY

## 2018-06-27 RX ORDER — CYCLOBENZAPRINE HCL 5 MG
5 TABLET ORAL 2 TIMES DAILY PRN
COMMUNITY
End: 2021-03-03

## 2018-06-27 RX ORDER — CETIRIZINE HYDROCHLORIDE 10 MG/1
10 TABLET ORAL DAILY
COMMUNITY

## 2018-06-27 RX ORDER — BACLOFEN 20 MG/1
20 TABLET ORAL 3 TIMES DAILY
COMMUNITY
End: 2018-07-03 | Stop reason: HOSPADM

## 2018-06-27 RX ORDER — MONTELUKAST SODIUM 10 MG/1
10 TABLET ORAL NIGHTLY
COMMUNITY
End: 2021-03-03

## 2018-06-27 RX ORDER — SODIUM CHLORIDE, SODIUM GLUCONATE, SODIUM ACETATE, POTASSIUM CHLORIDE, AND MAGNESIUM CHLORIDE 526; 502; 368; 37; 30 MG/100ML; MG/100ML; MG/100ML; MG/100ML; MG/100ML
1000 INJECTION, SOLUTION INTRAVENOUS CONTINUOUS
Status: CANCELLED | OUTPATIENT
Start: 2018-07-03

## 2018-06-27 RX ORDER — CHLORAL HYDRATE 500 MG
1000 CAPSULE ORAL
COMMUNITY
End: 2021-03-03

## 2018-06-27 RX ORDER — CHLOROTHIAZIDE 500 MG
500 TABLET ORAL EVERY MORNING
COMMUNITY
End: 2021-03-03

## 2018-06-27 RX ORDER — METOPROLOL TARTRATE 100 MG/1
100 TABLET ORAL 2 TIMES DAILY
COMMUNITY
End: 2020-01-30 | Stop reason: ALTCHOICE

## 2018-06-27 RX ORDER — AMLODIPINE BESYLATE 10 MG/1
10 TABLET ORAL DAILY
COMMUNITY
End: 2021-03-03

## 2018-06-27 RX ORDER — TRAMADOL HYDROCHLORIDE 50 MG/1
50 TABLET ORAL EVERY 6 HOURS PRN
COMMUNITY
End: 2020-01-30 | Stop reason: ALTCHOICE

## 2018-06-29 RX ORDER — PROMETHAZINE HYDROCHLORIDE 25 MG/1
TABLET ORAL
Qty: 30 TABLET | Refills: 1 | Status: SHIPPED | OUTPATIENT
Start: 2018-06-29

## 2018-06-29 RX ORDER — BACLOFEN 20 MG/1
TABLET ORAL
Qty: 90 TABLET | Refills: 0 | Status: SHIPPED | OUTPATIENT
Start: 2018-06-29

## 2018-07-01 NOTE — H&P
Subjective          Evangelist Gipson is a 52 y.o. male.     CCf/u nasal obstruction    History of Present Illness      Has trouble breathing through his nose on both sides congested some pressure does not have any fever, chills some drainage not severe has multiple other medical problems he says her recently controlled any significant response to use of the nasal sprays terms of helping his symptoms, want to be able to breath thru his nose better.  He has a Ct sinus which he provided.            The following portions of the patient's history were reviewed and updated as appropriate: allergies, current medications, past family history, past medical history, past social history, past surgical history and problem list.              Evangelist Gipson reports that he quit smoking about 22 years ago. He has never used smokeless tobacco. He reports that he does not drink alcohol or use drugs.    Patient is not a tobacco user and has been counseled for use of tobacco products                 Family History       Problem     Relation     Age of Onset       •     Heart disease     Mother             •     Osteoporosis     Mother             •     Lung cancer     Father             •     Prostate cancer     Father             •     Coronary artery disease     Father             •     COPD     Father             •     Hyperlipidemia     Father             •     Hypertension     Father             •     Diabetes     Maternal Grandmother             •     Hypertension     Other             •     Cancer     Other             •     Alcohol abuse     Brother             •     Coronary artery disease     Brother             •     COPD     Brother             •     Mental illness     Brother                            Current Outpatient Prescriptions:     •  albuterol (PROAIR RESPICLICK) 108 (90 BASE) MCG/ACT inhaler, Inhale 2 puffs every 4 (four) hours as needed for shortness of air., Disp: , Rfl:     •  amLODIPine  (NORVASC) 10 MG tablet, TAKE 1 TABLET BY MOUTH DAILY., Disp: 30 tablet, Rfl: 2    •  aspirin 81 MG EC tablet, Take 81 mg by mouth Daily., Disp: , Rfl:     •  azelastine (ASTELIN) 0.1 % nasal spray, 2 sprays into each nostril 2 (Two) Times a Day. Use in each nostril as directed, Disp: 30 mL, Rfl: 11    •  baclofen (LIORESAL) 20 MG tablet, TAKE ONE TABLET BY MOUTH THREE TIMES A DAY, Disp: 90 tablet, Rfl: 2    •  cetirizine (zyrTEC) 10 MG tablet, TAKE ONE TABLET BY MOUTH DAILY, Disp: 90 tablet, Rfl: 8    •  chlorothiazide (DIURIL) 500 MG tablet, TAKE 1 TABLET BY MOUTH EVERY MORNING., Disp: 30 tablet, Rfl: 2    •  clonazePAM (KlonoPIN) 0.5 MG tablet, Take 0.5 mg by mouth 3 (three) times a day as needed., Disp: , Rfl:     •  cyclobenzaprine (FLEXERIL) 5 MG tablet, TAKE 1-2 TABLETS BY MOUTH TWO TIMES A DAY AS NEEDED., Disp: 120 tablet, Rfl: 2    •  gabapentin (NEURONTIN) 600 MG tablet, Take 1 tablet by mouth 3 (Three) Times a Day., Disp: 90 tablet, Rfl: 2    •  ketoconazole (NIZORAL) 2 % shampoo, Apply  topically 2 (Two) Times a Week., Disp: 120 mL, Rfl: 1    •  metoprolol tartrate (LOPRESSOR) 100 MG tablet, TAKE 1 TABLET BY MOUTH 2 (TWO) TIMES A DAY., Disp: 60 tablet, Rfl: 2    •  metoprolol tartrate (LOPRESSOR) 25 MG tablet, TAKE ONE TABLET BY MOUTH TWO TIMES A DAY WITH 100MG TABLET, Disp: 60 tablet, Rfl: 2    •  mometasone (NASONEX) 50 MCG/ACT nasal spray, 2 sprays into each nostril 2 (Two) Times a Day., Disp: 17 g, Rfl: 11    •  montelukast (SINGULAIR) 10 MG tablet, TAKE ONE TABLET BY MOUTH AT BEDTIME, Disp: 90 tablet, Rfl: 2    •  Omega-3 Fatty Acids (FISH OIL) 1000 MG capsule capsule, Take  by mouth Daily With Breakfast., Disp: , Rfl:     •  pravastatin (PRAVACHOL) 20 MG tablet, TAKE 1 TABLET BY MOUTH DAILY., Disp: 90 tablet, Rfl: 2    •  promethazine (PHENERGAN) 25 MG tablet, TAKE 1 TABLET BY MOUTH EVERY 6 (SIX) HOURS AS NEEDED FOR NAUSEA OR VOMITING., Disp: 30 tablet, Rfl: 2    •  tamsulosin (FLOMAX) 0.4 MG  capsule 24 hr capsule, Take 1 capsule by mouth Every Night., Disp: 30 capsule, Rfl: 2    •  traMADol (ULTRAM) 50 MG tablet, Take 1 tablet by mouth Every 6 (Six) Hours As Needed for Moderate Pain ., Disp: 120 tablet, Rfl: 2    •  VENTOLIN  (90 Base) MCG/ACT inhaler, INHALE 2 PUFFS BY MOUTH EVERY 4-6 HOURS AS NEEDED, Disp: 18 g, Rfl: 2                Allergies       Allergen     Reactions       •     Gelatin     Anaphylaxis, Hives and Swelling       •     Ambien [Zolpidem Tartrate]             •     Beef-Derived Products             •     Dairy Aid [Lactase]             •     Latex             •     Pork-Derived Products             •     Remeron [Mirtazapine]                       Medical History              Past Medical History:       Diagnosis     Date       •     Allergic contact dermatitis             •     Allergic rhinitis             •     Anxiety             •     Anxiety             •     Arthritis             •     Asthma             •     Asthma             •     Benign lipomatous neoplasm                    Small lipoma clinically right chest wall          •     Chest wall tenderness                   right side of sternum       •     Epilepsy             •     Food allergy             •     [place] de la Tourette's syndrome                   neurology had been seeing him in [place]          •     Headache             •     Hypertension             •     Hypertension             •     Hypertension             •     Insomnia             •     Internal hemorrhoids             •     Low back pain             •     Malaise and fatigue             •     Multiple joint pain             •     Need for influenza vaccination             •     Neuralgia and neuritis             •     Polyp of intestine                   +FH, leiomyoma          •     Radiculitis             •     Seborrheic dermatitis             •     Skin sensation disturbance             •     Spasm of back muscles             •      Spinal stenosis of lumbar region                               Review of Systems     Constitutional: Negative for fever.     HENT: Positive for congestion and postnasal drip. Negative for facial swelling, nosebleeds and sore throat.      Hematological: Negative for adenopathy.                                  Objective          Physical Exam     Constitutional: He is oriented to person, place, and time. He appears well-developed and well-nourished.     HENT:     Head: Normocephalic and atraumatic.   Right Ear: Hearing, tympanic membrane, external ear and ear canal normal.   Left Ear: Hearing, tympanic membrane, external ear and ear canal normal.     Nose: Mucosal edema, nasal deformity and septal deviation present. No rhinorrhea. No epistaxis. Right sinus exhibits no maxillary sinus tenderness and no frontal sinus tenderness. Left sinus exhibits no maxillary sinus tenderness and no frontal sinus tenderness.     untitled image    Mouth/Throat: Uvula is midline and oropharynx is clear and moist. No trismus in the jaw. Normal dentition. No oropharyngeal exudate or posterior oropharyngeal edema.     Eyes: Conjunctivae and EOM are normal.     Neck: Normal range of motion. Neck supple. No JVD present. No tracheal deviation present. No thyromegaly present.     Pulmonary/Chest: Effort normal.   Musculoskeletal: Normal range of motion.   Lymphadenopathy:        Head (right side): No submental, no submandibular, no tonsillar, no preauricular, no posterior auricular and no occipital adenopathy present.        Head (left side): No submental, no submandibular, no tonsillar, no preauricular, no posterior auricular and no occipital adenopathy present.     He has no cervical adenopathy.        Right cervical: No superficial cervical, no deep cervical and no posterior cervical adenopathy present.       Left cervical: No superficial cervical, no deep cervical and no posterior cervical adenopathy present.   Neurological: He is  alert and oriented to person, place, and time. No cranial nerve deficit.     Skin: Skin is warm.   Psychiatric: He has a normal mood and affect. His speech is normal and behavior is normal. Thought content normal.     Nursing note and vitals reviewed.       Nose reveals turbinate hypertrophy and nasal septal deformity blocking his nose 80-90% on both sides of his nose. There is bilateral valve collapse with very narrow internal nasal angle.  Also positive Asher maneuver bilaterally       Result                   EXAMINATION:  CT sinus                      Indication: Sinusitis, <=12w, uncomplicated, J34.2 Deviated nasal    septum J34.3 Hypertrophy of nasal turbinates J32.8 Other chronic    sinusitis         History:    Correlative imaging:  CT facial bones 5/13/16          Technique:  iv contrast:  none            This exam was performed according to the departmental    dose-optimization program which includes automated exposure    control, adjustment of the mA and/or kV according to patient size    and/or use of iterative reconstruction technique.           FINDINGS:           Sinuses:                     Frontal sinuses:  negative             Ethmoidal sinuses:  negative                  Maxillary sinuses:  No evidence of an air-fluid level. Focal    soft tissue density on the left likely a mucoid retention cyst    measuring 0.7 cm.               Sphenoidal sinuses:  negative                  Nasal:      Osteomeatal units (OMU):  within normal limits                 Yeni bullosa:  Present involving the middle turbinates    proximally.      Nasal septum:  1 cm rightward deviation         Misc:  (as visualized)       Orbits:  unremarkable        Mastoids:  unremarkable        Brain:  unremarkable                    IMPRESSION:    CONCLUSION:             1. 1 cm rightward deviation of the nasal septum.    2. Bilateral yeni bullosa.    3. 0.7 cm mucoid retention cyst in the left maxillary sinus.                                   Electronically signed by:  ITZEL Yeboah MD  3/21/2018 4:29    PM CDT Workstation                                    Assessment/Plan          Evangelist was seen today for follow-up.         Diagnoses and all orders for this visit:         Nasal turbinate hypertrophy         Deviated nasal septum         Silvana bullosa         Nasal valve blockage              Discuss surgery there is no guarantee that symptoms resolved discussed recovery limitations complications failure rates success rate sense of smell could be worsened change in appearance concur with her scarring could require other surgeries complications current results were as ideal.  Discussed limitations after surgery recovering pain discomfort: Based on the CT findings I don't think he needs sinus surgery or nasal issue.  Since he has not responded well to nasal sprays and was to consider discussed with general local anesthesia with sedation -he will talk to the anesthesiologist regarding this issue  .    All questions were answered and he is agreement this approach and all questions were answered his wife is well consent was obtained                                      Richa Downs  Progress Notes Signed 06/07/2018 12:00 AM      Open Note                                                            Consult Note

## 2018-07-03 ENCOUNTER — ANESTHESIA (OUTPATIENT)
Dept: PERIOP | Facility: HOSPITAL | Age: 53
End: 2018-07-03

## 2018-07-03 ENCOUNTER — HOSPITAL ENCOUNTER (OUTPATIENT)
Facility: HOSPITAL | Age: 53
Setting detail: HOSPITAL OUTPATIENT SURGERY
Discharge: HOME | End: 2018-07-03
Attending: OTOLARYNGOLOGY | Admitting: ANESTHESIOLOGY

## 2018-07-03 VITALS
RESPIRATION RATE: 20 BRPM | SYSTOLIC BLOOD PRESSURE: 161 MMHG | HEIGHT: 70 IN | DIASTOLIC BLOOD PRESSURE: 93 MMHG | HEART RATE: 84 BPM | OXYGEN SATURATION: 95 % | BODY MASS INDEX: 30.2 KG/M2 | TEMPERATURE: 98.3 F | WEIGHT: 210.98 LBS

## 2018-07-03 DIAGNOSIS — J34.89 NASAL OBSTRUCTION: ICD-10-CM

## 2018-07-03 PROBLEM — J34.2 NASAL SEPTAL DEFORMITY: Status: ACTIVE | Noted: 2018-07-03

## 2018-07-03 PROBLEM — J34.3 NASAL TURBINATE HYPERTROPHY: Status: ACTIVE | Noted: 2018-07-03

## 2018-07-03 PROCEDURE — 25010000002 MIDAZOLAM PER 1 MG: Performed by: NURSE ANESTHETIST, CERTIFIED REGISTERED

## 2018-07-03 PROCEDURE — 25010000002 DEXAMETHASONE PER 1 MG: Performed by: NURSE ANESTHETIST, CERTIFIED REGISTERED

## 2018-07-03 PROCEDURE — 30465 REPAIR NASAL STENOSIS: CPT | Performed by: OTOLARYNGOLOGY

## 2018-07-03 PROCEDURE — 25010000002 ONDANSETRON PER 1 MG: Performed by: NURSE ANESTHETIST, CERTIFIED REGISTERED

## 2018-07-03 PROCEDURE — 88304 TISSUE EXAM BY PATHOLOGIST: CPT | Performed by: PATHOLOGY

## 2018-07-03 PROCEDURE — 25010000002 FENTANYL CITRATE (PF) 100 MCG/2ML SOLUTION: Performed by: NURSE ANESTHETIST, CERTIFIED REGISTERED

## 2018-07-03 PROCEDURE — 88304 TISSUE EXAM BY PATHOLOGIST: CPT | Performed by: OTOLARYNGOLOGY

## 2018-07-03 PROCEDURE — 25010000002 HYDROMORPHONE PER 4 MG: Performed by: NURSE ANESTHETIST, CERTIFIED REGISTERED

## 2018-07-03 PROCEDURE — 88300 SURGICAL PATH GROSS: CPT | Performed by: OTOLARYNGOLOGY

## 2018-07-03 PROCEDURE — 31240 NSL/SNS NDSC CNCH BULL RESCJ: CPT | Performed by: OTOLARYNGOLOGY

## 2018-07-03 PROCEDURE — 25010000002 PROPOFOL 10 MG/ML EMULSION: Performed by: NURSE ANESTHETIST, CERTIFIED REGISTERED

## 2018-07-03 PROCEDURE — 30130 EXCISE INFERIOR TURBINATE: CPT | Performed by: OTOLARYNGOLOGY

## 2018-07-03 RX ORDER — MIDAZOLAM HYDROCHLORIDE 1 MG/ML
INJECTION INTRAMUSCULAR; INTRAVENOUS AS NEEDED
Status: DISCONTINUED | OUTPATIENT
Start: 2018-07-03 | End: 2018-07-03 | Stop reason: SURG

## 2018-07-03 RX ORDER — EPHEDRINE SULFATE 50 MG/ML
5 INJECTION, SOLUTION INTRAVENOUS ONCE AS NEEDED
Status: DISCONTINUED | OUTPATIENT
Start: 2018-07-03 | End: 2018-07-03 | Stop reason: HOSPADM

## 2018-07-03 RX ORDER — CEFDINIR 300 MG/1
300 CAPSULE ORAL 2 TIMES DAILY
Qty: 14 CAPSULE | Refills: 0 | Status: SHIPPED | OUTPATIENT
Start: 2018-07-03 | End: 2018-07-20

## 2018-07-03 RX ORDER — LABETALOL HYDROCHLORIDE 5 MG/ML
5 INJECTION, SOLUTION INTRAVENOUS
Status: DISCONTINUED | OUTPATIENT
Start: 2018-07-03 | End: 2018-07-03 | Stop reason: HOSPADM

## 2018-07-03 RX ORDER — GLYCOPYRROLATE 0.2 MG/ML
INJECTION INTRAMUSCULAR; INTRAVENOUS AS NEEDED
Status: DISCONTINUED | OUTPATIENT
Start: 2018-07-03 | End: 2018-07-03 | Stop reason: SURG

## 2018-07-03 RX ORDER — ONDANSETRON 2 MG/ML
INJECTION INTRAMUSCULAR; INTRAVENOUS AS NEEDED
Status: DISCONTINUED | OUTPATIENT
Start: 2018-07-03 | End: 2018-07-03 | Stop reason: SURG

## 2018-07-03 RX ORDER — ROCURONIUM BROMIDE 10 MG/ML
INJECTION, SOLUTION INTRAVENOUS AS NEEDED
Status: DISCONTINUED | OUTPATIENT
Start: 2018-07-03 | End: 2018-07-03 | Stop reason: SURG

## 2018-07-03 RX ORDER — MEPERIDINE HYDROCHLORIDE 50 MG/ML
12.5 INJECTION INTRAMUSCULAR; INTRAVENOUS; SUBCUTANEOUS
Status: DISCONTINUED | OUTPATIENT
Start: 2018-07-03 | End: 2018-07-03 | Stop reason: HOSPADM

## 2018-07-03 RX ORDER — FENTANYL CITRATE 50 UG/ML
INJECTION, SOLUTION INTRAMUSCULAR; INTRAVENOUS AS NEEDED
Status: DISCONTINUED | OUTPATIENT
Start: 2018-07-03 | End: 2018-07-03 | Stop reason: SURG

## 2018-07-03 RX ORDER — ONDANSETRON 2 MG/ML
4 INJECTION INTRAMUSCULAR; INTRAVENOUS ONCE AS NEEDED
Status: DISCONTINUED | OUTPATIENT
Start: 2018-07-03 | End: 2018-07-03 | Stop reason: HOSPADM

## 2018-07-03 RX ORDER — DIPHENHYDRAMINE HYDROCHLORIDE 50 MG/ML
12.5 INJECTION INTRAMUSCULAR; INTRAVENOUS
Status: DISCONTINUED | OUTPATIENT
Start: 2018-07-03 | End: 2018-07-03 | Stop reason: HOSPADM

## 2018-07-03 RX ORDER — ACETAMINOPHEN 325 MG/1
650 TABLET ORAL ONCE AS NEEDED
Status: DISCONTINUED | OUTPATIENT
Start: 2018-07-03 | End: 2018-07-03 | Stop reason: HOSPADM

## 2018-07-03 RX ORDER — NALOXONE HCL 0.4 MG/ML
0.2 VIAL (ML) INJECTION AS NEEDED
Status: DISCONTINUED | OUTPATIENT
Start: 2018-07-03 | End: 2018-07-03 | Stop reason: HOSPADM

## 2018-07-03 RX ORDER — ACETAMINOPHEN 650 MG/1
650 SUPPOSITORY RECTAL ONCE AS NEEDED
Status: DISCONTINUED | OUTPATIENT
Start: 2018-07-03 | End: 2018-07-03 | Stop reason: HOSPADM

## 2018-07-03 RX ORDER — SODIUM CHLORIDE, SODIUM GLUCONATE, SODIUM ACETATE, POTASSIUM CHLORIDE, AND MAGNESIUM CHLORIDE 526; 502; 368; 37; 30 MG/100ML; MG/100ML; MG/100ML; MG/100ML; MG/100ML
1000 INJECTION, SOLUTION INTRAVENOUS CONTINUOUS
Status: DISCONTINUED | OUTPATIENT
Start: 2018-07-03 | End: 2018-07-03 | Stop reason: HOSPADM

## 2018-07-03 RX ORDER — LIDOCAINE HYDROCHLORIDE AND EPINEPHRINE 10; 10 MG/ML; UG/ML
INJECTION, SOLUTION INFILTRATION; PERINEURAL AS NEEDED
Status: DISCONTINUED | OUTPATIENT
Start: 2018-07-03 | End: 2018-07-03 | Stop reason: HOSPADM

## 2018-07-03 RX ORDER — HYDROCODONE BITARTRATE AND ACETAMINOPHEN 7.5; 325 MG/1; MG/1
1-2 TABLET ORAL EVERY 6 HOURS PRN
Qty: 20 TABLET | Refills: 0 | Status: SHIPPED | OUTPATIENT
Start: 2018-07-03 | End: 2018-07-20

## 2018-07-03 RX ORDER — FLUMAZENIL 0.1 MG/ML
0.2 INJECTION INTRAVENOUS AS NEEDED
Status: DISCONTINUED | OUTPATIENT
Start: 2018-07-03 | End: 2018-07-03 | Stop reason: HOSPADM

## 2018-07-03 RX ORDER — PROPOFOL 10 MG/ML
VIAL (ML) INTRAVENOUS AS NEEDED
Status: DISCONTINUED | OUTPATIENT
Start: 2018-07-03 | End: 2018-07-03 | Stop reason: SURG

## 2018-07-03 RX ORDER — HYDROCODONE BITARTRATE AND ACETAMINOPHEN 7.5; 325 MG/1; MG/1
1 TABLET ORAL ONCE AS NEEDED
Status: DISCONTINUED | OUTPATIENT
Start: 2018-07-03 | End: 2018-07-03 | Stop reason: HOSPADM

## 2018-07-03 RX ORDER — LIDOCAINE HYDROCHLORIDE 20 MG/ML
INJECTION, SOLUTION INFILTRATION; PERINEURAL AS NEEDED
Status: DISCONTINUED | OUTPATIENT
Start: 2018-07-03 | End: 2018-07-03 | Stop reason: SURG

## 2018-07-03 RX ORDER — HYDROMORPHONE HCL 110MG/55ML
0.5 PATIENT CONTROLLED ANALGESIA SYRINGE INTRAVENOUS
Status: DISCONTINUED | OUTPATIENT
Start: 2018-07-03 | End: 2018-07-03 | Stop reason: HOSPADM

## 2018-07-03 RX ORDER — DEXAMETHASONE SODIUM PHOSPHATE 4 MG/ML
INJECTION, SOLUTION INTRA-ARTICULAR; INTRALESIONAL; INTRAMUSCULAR; INTRAVENOUS; SOFT TISSUE AS NEEDED
Status: DISCONTINUED | OUTPATIENT
Start: 2018-07-03 | End: 2018-07-03 | Stop reason: SURG

## 2018-07-03 RX ADMIN — SODIUM CHLORIDE, SODIUM GLUCONATE, SODIUM ACETATE, POTASSIUM CHLORIDE, AND MAGNESIUM CHLORIDE 1000 ML: 526; 502; 368; 37; 30 INJECTION, SOLUTION INTRAVENOUS at 11:52

## 2018-07-03 RX ADMIN — HYDROMORPHONE HYDROCHLORIDE 0.5 MG: 2 INJECTION INTRAMUSCULAR; INTRAVENOUS; SUBCUTANEOUS at 14:10

## 2018-07-03 RX ADMIN — FENTANYL CITRATE 100 MCG: 50 INJECTION, SOLUTION INTRAMUSCULAR; INTRAVENOUS at 12:34

## 2018-07-03 RX ADMIN — HYDROMORPHONE HYDROCHLORIDE 0.5 MG: 2 INJECTION INTRAMUSCULAR; INTRAVENOUS; SUBCUTANEOUS at 14:03

## 2018-07-03 RX ADMIN — HYDROMORPHONE HYDROCHLORIDE 0.5 MG: 2 INJECTION INTRAMUSCULAR; INTRAVENOUS; SUBCUTANEOUS at 13:57

## 2018-07-03 RX ADMIN — PROPOFOL 200 MG: 10 INJECTION, EMULSION INTRAVENOUS at 12:34

## 2018-07-03 RX ADMIN — FENTANYL CITRATE 50 MCG: 50 INJECTION, SOLUTION INTRAMUSCULAR; INTRAVENOUS at 12:43

## 2018-07-03 RX ADMIN — HYDROCODONE BITARTRATE AND ACETAMINOPHEN 1 TABLET: 7.5; 325 TABLET ORAL at 14:38

## 2018-07-03 RX ADMIN — GLYCOPYRROLATE 0.1 MG: 0.2 INJECTION, SOLUTION INTRAMUSCULAR; INTRAVENOUS at 13:03

## 2018-07-03 RX ADMIN — HYDROMORPHONE HYDROCHLORIDE 0.5 MG: 2 INJECTION INTRAMUSCULAR; INTRAVENOUS; SUBCUTANEOUS at 14:22

## 2018-07-03 RX ADMIN — MIDAZOLAM 2 MG: 1 INJECTION INTRAMUSCULAR; INTRAVENOUS at 12:28

## 2018-07-03 RX ADMIN — ROCURONIUM BROMIDE 5 MG: 10 INJECTION INTRAVENOUS at 12:43

## 2018-07-03 RX ADMIN — LIDOCAINE HYDROCHLORIDE 100 MG: 20 INJECTION, SOLUTION INFILTRATION; PERINEURAL at 12:43

## 2018-07-03 RX ADMIN — ONDANSETRON 4 MG: 2 INJECTION INTRAMUSCULAR; INTRAVENOUS at 13:30

## 2018-07-03 RX ADMIN — DEXAMETHASONE SODIUM PHOSPHATE 8 MG: 4 INJECTION, SOLUTION INTRAMUSCULAR; INTRAVENOUS at 12:53

## 2018-07-03 NOTE — ANESTHESIA PROCEDURE NOTES
Airway  Urgency: elective    Airway not difficult    General Information and Staff    Patient location during procedure: OR  CRNA: EFRAIN BOO    Indications and Patient Condition  Indications for airway management: airway protection    Preoxygenated: yes  MILS not maintained throughout  Mask difficulty assessment: 1 - vent by mask    Final Airway Details  Final airway type: endotracheal airway      Successful airway: ETT  Cuffed: yes   Successful intubation technique: video laryngoscopy  Facilitating devices/methods: intubating stylet  Endotracheal tube insertion site: oral  Blade: Clarisa  Blade size: #4  ETT size: 7.5 (oral stephanie) mm  Cormack-Lehane Classification: grade I - full view of glottis  Placement verified by: chest auscultation and capnometry   Measured from: lips  Number of attempts at approach: 2 (first attempt with mac 4, no grade 3 view, 2nd attempt tena 4 grade 1 view )

## 2018-07-03 NOTE — BRIEF OP NOTE
SEPTOPLASTY SUBMUCOSAL RESECTION OF INFERIOR TURBINATES WITH NASAL VALVE IMPLANT  Progress Note    Evangelist Gipson  7/3/2018    Pre-op Diagnosis:   Nasal obstruction [J34.89] nasal septal deviation, yeni bullosa, nasal valve stenosis, turbinate hypertrophy       Post-Op Diagnosis Codes:     * Nasal obstruction [J34.89] same    Procedure/CPT® Codes:      Procedure(s):  NASAL VALVE AND NASAL SEPTAL REPAIR , BILATERAL TURBINOPLASTIES, BILATERAL ENDOSCPIC  CONCH BULLOSA SURGERIES                     (LATEX ALLERGY)    Surgeon(s):  Sagar Lang MD    Anesthesia: General with local 1%lidocaine 1:100:000    Staff:   Circulator: Dulce Metcalf RN  Scrub Person: Diann Bhatt  Assistant: Bernie Lucero    Estimated Blood Loss:  20 ml     Specimens:                ID Type Source Tests Collected by Time   A : septum Tissue Nose TISSUE PATHOLOGY EXAM Sagar Lagn MD 7/3/2018 1319   B : yeni bullosa Tissue Nose TISSUE PATHOLOGY EXAM Sagar Lang MD 7/3/2018 1319         Drains:  none    Findings:  Nsd, turb hypertrophy  Complications:  na      Sagar Lang MD     Date: 7/3/2018  Time: 1:48 PM

## 2018-07-03 NOTE — OP NOTE
OPERATIVE NOTE    Name:    Evangelist Gipson  YOB: 1965  Date of surgery:   7/3/2018    Pre-op Diagnosis:   Nasal obstruction [J34.89]  Secondary to yeni bullosa, nasal septal deformity, bilateral inferior turbinate hypertrophy, and nasal valve collapse and stenosis bilaterally  Post-op Diagnosis:    Post-Op Diagnosis Codes:     * Nasal obstruction [J34.89]  Same  Procedure:  Procedure(s):  NASAL VALVE AND NASAL SEPTAL REPAIR , BILATERAL TURBINOPLASTIES, BILATERAL ENDOSCPIC  CONCH BULLOSA SURGERIES                     (LATEX ALLERGY)    Surgeon:  Sagar Lang MD, AAOHNS    Anesthesia: General with local infiltration of approximately 16 mL of 1% lidocaine 1 100,000 epinephrine    Staff:   Circulator: Dulce Metcalf RN  Scrub Person: Diann Bhatt  Assistant: Bernie Lucero    Estimated Blood Loss:   Specimens:  30 ml  ID Type Source Tests Collected by Time   A : septum Tissue Nose TISSUE PATHOLOGY EXAM Sagar Lang MD 7/3/2018 1319   B : yeni bullosa Tissue Nose TISSUE PATHOLOGY EXAM Sagar Lang MD 7/3/2018 1319         Drains: None     Findings:   Superiorly deviated septum right worse than left marked turbinate hypertrophy yeni bullosa large on the left minimal on the right bilateral nasal valve stenosis collapse      Complications: None    IMPLANTS:   Nothing was implanted during the procedure    INDICATIONS: failed medical therapy and patient choice after discussion of the risks benefits of each treatment approach approach that he rates success rate recovering complications and risk.  The CT sinus scan confirmed the diagnosis and lack of sinusitis    PROCEDURE: The patient was taken to the operating room and placed in supine position.  Gen. anesthesia was carried out.  The CT scan was reviewed.  The timeout was carried out.  The nose is injected with lidocaine and epinephrine the septum turbinates yeni bullosa on Afrin pledgets were placed in the nose.  Patient was  prepped and draped.     Attention was then taken to the left nostril and hemitransfixion incision was made in the left with mucoperichondrial and periosteal flaps elevated.  The bony septum was fractured and the midportion of the bone removed opening up the airway.  Small metacarpal lesions removed in the process to try and put back in position later quilting sutures were placed through and through the septal mucosal flaps.  Due to the tendency of the cartilage on the right to flare laterally, despite the whipstitch to hold the cartilage and placed splints were placed and then sutured with nylon.  This was done at the end of the procedure.  Prior to this the inferior turbinates are outfractured and the mucosal and external mucosal reduction was carried out with cautery.    The yeni bullosa's were evaluated the right was crushed but not excised because it was relatively small and then the left had the lateral aspect partially removed opening of the nose.   An incision and nasal valve area skin in the  Inter- cartilaginous area was removed.  Cartilage from the septum between the upper and lower lateral cartilage ,was introduced and then sutured in place to batten nasal valve in place laterally.    This was done with PDS sutures.  After mention splint was placed and nose being irrigated prior to this reinspect for bleeding patient will continue recovery room without evidence of complications in stable condition  Spoke to the patient postoperatively reviewed the surgery and instructions              This document has been electronically signed by Sagar Lang MD on July 3, 2018 5:40 PM

## 2018-07-03 NOTE — ANESTHESIA POSTPROCEDURE EVALUATION
Patient: Evangelist Gipson    Procedure Summary     Date:  07/03/18 Room / Location:  [facility] OR 08 / [facility] OR    Anesthesia Start:  1228 Anesthesia Stop:  1343    Procedure:  NASAL VALVE AND NASAL SEPTAL REPAIR , BILATERAL TURBINOPLASTIES, BILATERAL ENDOSCPIC  CONCH BULLOSA SURGERIES                     (LATEX ALLERGY) (Bilateral ) Diagnosis:       Nasal obstruction      (Nasal obstruction [J34.89])    Surgeon:  Sagar Lang MD Provider:  Blair Pressley MD    Anesthesia Type:  general ASA Status:  3          Anesthesia Type: general  Last vitals  BP   143/80 (07/03/18 1156)   Temp   98.6 °F (37 °C) (07/03/18 1156)   Pulse   68 (07/03/18 1156)   Resp   18 (07/03/18 1156)     SpO2   97 % (07/03/18 1156)     Post Anesthesia Care and Evaluation    Patient location during evaluation: PACU  Patient participation: complete - patient participated  Level of consciousness: awake and awake and alert  Pain score: 3  Pain management: satisfactory to patient  Airway patency: patent  Anesthetic complications: No anesthetic complications  PONV Status: none  Cardiovascular status: acceptable and stable  Respiratory status: acceptable, room air and spontaneous ventilation  Hydration status: acceptable

## 2018-07-03 NOTE — INTERVAL H&P NOTE
Pt seen, no new issues noted.  All questions answered of pt and family  Vitals reviewed.  BRAXTON Lang MD

## 2018-07-05 LAB
LAB AP CASE REPORT: NORMAL
PATH REPORT.FINAL DX SPEC: NORMAL
PATH REPORT.GROSS SPEC: NORMAL

## 2018-07-10 ENCOUNTER — OFFICE VISIT (OUTPATIENT)
Dept: OTOLARYNGOLOGY | Facility: CLINIC | Age: 53
End: 2018-07-10

## 2018-07-10 VITALS — HEIGHT: 70 IN | TEMPERATURE: 98.2 F | BODY MASS INDEX: 29.89 KG/M2 | WEIGHT: 208.8 LBS

## 2018-07-10 DIAGNOSIS — J34.2 DEVIATED NASAL SEPTUM: Primary | ICD-10-CM

## 2018-07-10 PROCEDURE — 99024 POSTOP FOLLOW-UP VISIT: CPT | Performed by: OTOLARYNGOLOGY

## 2018-07-10 NOTE — PROGRESS NOTES
Mr. Gipson comes back after his nasal septal bowel surgery.  His splints removed without difficulty he can says he is breathing amazingly well.  He had many questions which I answered.  He's having some problems  with facialskin have asked him talk to sleep doctor because apparently he is having a reaction to the mask uses  forCPAP.  He is going to try using nasal pillows instead which she is tolerating the past.  Good position there is no septal hematoma is healing well recheck in 3 weeks.  Under gradually increase activity gently blow his nose.  He's not to get any trauma to the nose and to use nasal saline irrigation at least twice a day he's agreement with this all questions answered  BRAXTON Lang MD

## 2018-07-10 NOTE — PATIENT INSTRUCTIONS

## 2018-07-31 ENCOUNTER — OFFICE VISIT (OUTPATIENT)
Dept: OTOLARYNGOLOGY | Facility: CLINIC | Age: 53
End: 2018-07-31

## 2018-07-31 VITALS — BODY MASS INDEX: 29.6 KG/M2 | WEIGHT: 206.8 LBS | HEIGHT: 70 IN | TEMPERATURE: 98 F

## 2018-07-31 DIAGNOSIS — Z09 POSTOP CHECK: Primary | ICD-10-CM

## 2018-07-31 PROCEDURE — 99024 POSTOP FOLLOW-UP VISIT: CPT | Performed by: OTOLARYNGOLOGY

## 2018-07-31 RX ORDER — PREDNISOLONE ACETATE 10 MG/ML
SUSPENSION/ DROPS OPHTHALMIC
Refills: 1 | COMMUNITY
Start: 2018-07-27

## 2018-07-31 NOTE — PROGRESS NOTES
Patient comes back for routine follow-up had some crusting when to show me where he had some thick mucus and old blood mixed together is concerned based be something else..  I examined and found to more crusting showed him what was it's around the yeni bullosa on the left which healing but the piece I removed a smaller than when he had had explained it usually takes to 3 months to completely get that he quit forming but it's not enlarged and I see no pus coming out of the ostomy and a unit he's healing well otherwise.  His nasal breathing has improved he says he is breathing much better and is CPAP uses easier now that he has more open airway.    He will f/u in 6 weeks he's coughing unusual problems or questions will see him then all questions were answered.  Endoscopy was done to remove the crusting.    BRAXTON Lang M.D.

## 2018-09-18 ENCOUNTER — OFFICE VISIT (OUTPATIENT)
Dept: OTOLARYNGOLOGY | Facility: CLINIC | Age: 53
End: 2018-09-18

## 2018-09-18 VITALS — WEIGHT: 212 LBS | BODY MASS INDEX: 30.35 KG/M2 | TEMPERATURE: 97.4 F | HEIGHT: 70 IN

## 2018-09-18 DIAGNOSIS — Z09 POSTOP CHECK: Primary | ICD-10-CM

## 2018-09-18 PROCEDURE — 99024 POSTOP FOLLOW-UP VISIT: CPT | Performed by: OTOLARYNGOLOGY

## 2018-09-18 NOTE — PROGRESS NOTES
Patient comes back after his nasal surgery.  He is very happy unusual she says he is not any pain discomfort soreness.  He finds his CPAP use much easier now    He breathes well through his nose during the day he's not had any unusual bleeding or drainage.  Examination shows a much improved airway no crusting noted mouth walls are out compared to what was his no significant external deformity and septums straighter.  He is to call for heavy problems or questions will see him as needed  BRAXTON Lang MD

## 2018-09-18 NOTE — PATIENT INSTRUCTIONS

## 2018-11-15 ENCOUNTER — HOSPITAL ENCOUNTER (OUTPATIENT)
Dept: MRI IMAGING | Facility: HOSPITAL | Age: 53
Discharge: HOME | End: 2018-11-15
Admitting: FAMILY MEDICINE

## 2018-11-15 DIAGNOSIS — R20.2 NUMBNESS OR TINGLING: ICD-10-CM

## 2018-11-15 DIAGNOSIS — M54.2 NECK PAIN: ICD-10-CM

## 2018-11-15 DIAGNOSIS — R20.0 NUMBNESS OR TINGLING: ICD-10-CM

## 2018-11-15 PROCEDURE — 72141 MRI NECK SPINE W/O DYE: CPT

## 2019-01-29 ENCOUNTER — OFFICE VISIT (OUTPATIENT)
Dept: CARDIAC SURGERY | Facility: CLINIC | Age: 54
End: 2019-01-29

## 2019-01-29 VITALS
BODY MASS INDEX: 31.5 KG/M2 | TEMPERATURE: 98.9 F | OXYGEN SATURATION: 99 % | WEIGHT: 220 LBS | DIASTOLIC BLOOD PRESSURE: 65 MMHG | HEIGHT: 70 IN | SYSTOLIC BLOOD PRESSURE: 150 MMHG | HEART RATE: 74 BPM

## 2019-01-29 DIAGNOSIS — I10 ESSENTIAL HYPERTENSION: ICD-10-CM

## 2019-01-29 DIAGNOSIS — E78.5 HYPERLIPIDEMIA, UNSPECIFIED HYPERLIPIDEMIA TYPE: ICD-10-CM

## 2019-01-29 DIAGNOSIS — I65.22 STENOSIS OF LEFT CAROTID ARTERY: Primary | ICD-10-CM

## 2019-01-29 PROCEDURE — 99214 OFFICE O/P EST MOD 30 MIN: CPT | Performed by: NURSE PRACTITIONER

## 2019-01-29 RX ORDER — PRAVASTATIN SODIUM 20 MG
40 TABLET ORAL DAILY
Qty: 90 TABLET | Refills: 2
Start: 2019-01-29 | End: 2020-01-30 | Stop reason: SDUPTHER

## 2019-01-29 RX ORDER — ASPIRIN 81 MG/1
81 TABLET ORAL DAILY
COMMUNITY

## 2019-01-29 NOTE — PATIENT INSTRUCTIONS
mild Carotid Artery Stenosis bilateral remained stable Asymptomatic  Medical Management: ASA,STATIN   If you should experience any neurological symptoms including but not limited to visual or speech disturbances confusion, seizures, or weakness of limbs of one side of your body notify Heart and Vascular center immediately for evaluation or if after hours present to the nearest Emergency Department.   Return 1 year-Carotid Duplex

## 2019-01-30 NOTE — PROGRESS NOTES
Subjective   Patient ID: Evangelist Gipson is a 53 y.o. male is here today for follow-up.    Chief Complaint:    Chief Complaint   Patient presents with   • Carotid Artery Disease     1 year follow up        Carotid Artery Disease   Pertinent negatives include no numbness or weakness.     The following portions of the patient's history were reviewed and updated as appropriate: allergies, current medications, past family history, past medical history, past social history, past surgical history and problem list.  Recent images independently reviewed.  Available laboratory values reviewed.  PCP:  Ashley Watts MD  Cardiology:  Dr Causey ([facility])    53 y.o. male with HTN, carotid stenosis, CAD,  tourettes, DJD, anxiety, chronic back pain, DAVY.  former smoker.  Moderate vibration head when bending over x 6 months.  Abnormal MRI brain.  Controlled sleep apnea on bipap x 2 months.  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  No other associated signs, symptoms or modifying factors.    12/2014 Carotid Duplex:  PAXTON 0-15% (85cm/s), LICA 0-15% (122cm/s)  7/2016 Carotid Duplex:  PAXTON 0-15% (76cm/s) antegrade vert, LICA 0-15% (68cm/s) antegrade vert  7/2016 MRI Neck: PAXTON small ulceration posterior medial proximal ICA.  LICA 35%  7/2016 MRI Brain:  Mild microvascular disease.  11mm LEFT maxillary sinus retention cyst.  1/24/18: Carotid Duplex: PAXTON 0-49% LICA 0-49%. Antegrade  1/29/19: Carotid duplex: PAXTON 0-49% (72cm/s) LICA 0-49% (82cm/s) Antegrade    Past Medical History:   Diagnosis Date   • Allergic contact dermatitis    • Allergic rhinitis    • Anesthesia     uncontrolled bp and paralysis   • Anesthesia     2014 increased bp after surgery and paralysis (C3 surgery)   • Anxiety    • Anxiety    • Arthritis    • Asthma    • Asthma    • Benign lipomatous neoplasm      Small lipoma clinically right chest wall      • Chest wall tenderness     right side of sternum   • Epilepsy (CMS/HCC)      myoclonic movement disorder   • Food allergy    • [place]den de la Tourette's syndrome     neurology had been seeing him in [place]      • Headache    • Hypertension    • Hypertension    • Hypertension    • Insomnia    • Internal hemorrhoids    • Kyphosis    • Low back pain    • Malaise and fatigue    • Rhonda (CMS/HCC)    • Multiple joint pain    • Myoclonic disorder    • Need for influenza vaccination    • Neuralgia and neuritis    • OCD (obsessive compulsive disorder)    • Polyp of intestine     +FH, leiomyoma      • Psoriasis     psoriasis vs eczema vs fungal infections   • Radiculitis    • [place] spotted fever    • Seborrheic dermatitis    • Skin sensation disturbance    • Spasm of back muscles    • Spinal stenosis of lumbar region    • Tinnitus    • Urinary frequency    • Wears glasses      Past Surgical History:   Procedure Laterality Date   • BACK SURGERY     • CERVICAL DISC SURGERY      c3 , placed cage and titinium talha   • COLONOSCOPY W/ POLYPECTOMY  05/27/2015    A single polyp was found in the colon; removed by snare cautery polypectomy. Internal and external hemorrhoids found.   • ENDOSCOPY  05/27/2015    Esophagitis seen. Biopsy taken. Gastritis found in the body of the stomach. Biopsy taken. Normal duodenum.   • INCISION AND DRAINAGE ABSCESS  09/24/2013   • INJECTION OF MEDICATION  02/29/2016    Depo Medrol (Methylprednisone)   • INJECTION OF MEDICATION  06/23/2016    Kenalog   • INJECTION OF MEDICATION  10/21/2015    Toradol   • NASAL SEPTOPLASTY W/ TURBINOPLASTY Bilateral 7/3/2018    Procedure: NASAL VALVE AND NASAL SEPTAL REPAIR , BILATERAL TURBINOPLASTIES, BILATERAL ENDOSCPIC  CONCH BULLOSA SURGERIES                     (LATEX ALLERGY);  Surgeon: Sagar Lang MD;  Location: [facility];  Service: ENT   • PROCEDURE GENERIC CONVERTED  03/21/2016    BMI NOT DOC NO REASON GIVEN    • PROCEDURE GENERIC CONVERTED  05/04/2016    CT OF SINUS ORD W/SINUSITIS DX FOR DOC RSN    • PROCEDURE GENERIC  CONVERTED  08/27/2015    MOST RECENT SYSTOLIC BP < 140 mmHg    • PROCEDURE GENERIC CONVERTED  03/21/2016    TOBACCO NON-USER    • SKIN BIOPSY  02/26/2016   • STEROID INJECTION  04/14/2016    Celestone (betamethasone)       ALLERGIES:   Gelatin; Ambien [zolpidem tartrate]; Beef-derived products; Dairy aid [lactase]; Latex; Other; Pork-derived products; and Remeron [mirtazapine]    MEDICATIONS:      Current Outpatient Medications:   •  albuterol (PROAIR RESPICLICK) 108 (90 BASE) MCG/ACT inhaler, Inhale 2 puffs every 4 (four) hours as needed for shortness of air., Disp: , Rfl:   •  amLODIPine (NORVASC) 10 MG tablet, Take 10 mg by mouth Daily., Disp: , Rfl:   •  aspirin 81 MG EC tablet, Take 81 mg by mouth Daily., Disp: , Rfl:   •  baclofen (LIORESAL) 20 MG tablet, TAKE ONE TABLET BY MOUTH THREE TIMES A DAY, Disp: 90 tablet, Rfl: 0  •  cetirizine (zyrTEC) 10 MG tablet, Take 10 mg by mouth Daily., Disp: , Rfl:   •  chlorothiazide (DIURIL) 500 MG tablet, Take 500 mg by mouth Every Morning., Disp: , Rfl:   •  clonazePAM (KlonoPIN) 0.5 MG tablet, Take 0.5 mg by mouth 3 (Three) Times a Day., Disp: , Rfl:   •  cyclobenzaprine (FLEXERIL) 5 MG tablet, Take 5 mg by mouth 2 (Two) Times a Day As Needed for Muscle Spasms. 1-2 tablets, Disp: , Rfl:   •  gabapentin (NEURONTIN) 600 MG tablet, Take 1 tablet by mouth 3 (Three) Times a Day., Disp: 90 tablet, Rfl: 2  •  metoprolol tartrate (LOPRESSOR) 100 MG tablet, Take 100 mg by mouth 2 (Two) Times a Day., Disp: , Rfl:   •  metoprolol tartrate (LOPRESSOR) 25 MG tablet, Take 25 mg by mouth 2 (Two) Times a Day., Disp: , Rfl:   •  montelukast (SINGULAIR) 10 MG tablet, Take 10 mg by mouth Every Night., Disp: , Rfl:   •  Omega-3 Fatty Acids (FISH OIL) 1000 MG capsule capsule, Take 1,000 mg by mouth Daily With Breakfast., Disp: , Rfl:   •  pravastatin (PRAVACHOL) 20 MG tablet, Take 2 tablets by mouth Daily., Disp: 90 tablet, Rfl: 2  •  prednisoLONE acetate (PRED FORTE) 1 % ophthalmic  suspension, INSTILL ONE DROP IN LEFT EYE AS DIRECTED, Disp: , Rfl: 1  •  Probiotic Product (PROBIOTIC DAILY PO), Take 1 tablet by mouth Daily., Disp: , Rfl:   •  promethazine (PHENERGAN) 25 MG tablet, TAKE 1 TABLET BY MOUTH EVERY 6 (SIX) HOURS AS NEEDED FOR NAUSEA OR VOMITING., Disp: 30 tablet, Rfl: 1  •  tamsulosin (FLOMAX) 0.4 MG capsule 24 hr capsule, Take 1 capsule by mouth Every Night., Disp: 30 capsule, Rfl: 2  •  traMADol (ULTRAM) 50 MG tablet, Take 50 mg by mouth Every 6 (Six) Hours As Needed for Moderate Pain . On hold as instructed per Dr. Lang, Disp: , Rfl:     Review of Systems   Constitution: Negative for weakness.   Eyes: Negative for visual disturbance.   Cardiovascular: Negative for claudication and cyanosis.   Respiratory: Negative for shortness of breath.    Skin: Negative for color change and nail changes.   Musculoskeletal: Positive for arthritis, back pain and muscle weakness.   Gastrointestinal: Negative for dysphagia.   Neurological: Positive for dizziness. Negative for focal weakness, light-headedness, loss of balance, numbness and paresthesias.   Psychiatric/Behavioral: Negative for altered mental status.        Objective       Vitals:    01/29/19 1328   BP: 150/65   Pulse: 74   Temp: 98.9 °F (37.2 °C)   SpO2: 99%     Body mass index is 31.57 kg/m².  Physical Exam   Constitutional: He is oriented to person, place, and time. He appears well-developed.   HENT:   Head: Normocephalic.   Eyes: EOM are normal.   Neck: Neck supple. Carotid bruit is not present.   Cardiovascular: Normal rate.   Pulses:       Dorsalis pedis pulses are 2+ on the right side, and 2+ on the left side.        Posterior tibial pulses are 2+ on the right side, and 2+ on the left side.   Pulmonary/Chest: Effort normal and breath sounds normal.   Abdominal: Soft. Bowel sounds are normal.   Musculoskeletal: Normal range of motion.   Gait normal   Neurological: He is alert and oriented to person, place, and time.   Skin: Skin  is warm and dry.   No venous staining   Psychiatric: He has a normal mood and affect. Thought content normal.   Vitals reviewed.        Assessment/Plan   Independent Review of Radiographic Studies:    Detailed discussion regarding risks, benefits, and treatment plan.  Patient understands, agrees, and wishes to proceed with plan.     1. Stenosis of left carotid artery  mild Carotid Artery Stenosis bilateral remained stable Asymptomatic  Medical Management: ASA,STATIN   If you should experience any neurological symptoms including but not limited to visual or speech disturbances confusion, seizures, or weakness of limbs of one side of your body notify Heart and Vascular center immediately for evaluation or if after hours present to the nearest Emergency Department.   Return 1 year-Carotid Duplex  - Duplex Carotid Ultrasound CAR; Future    2. Essential hypertension      3. Hyperlipidemia, unspecified hyperlipidemia type  Lipid-lowering therapy has been proven beneficial in patients with vascular disease. Current guidelines recommend statin treatment for all patients with PAD and carotid stenosis. Statins are beneficial in preventing cardiovascular events, increasing functional capacity and lower the risk of adverse limb loss in PAD. Statins decrease the progression of plaque formation and may improve peripheral vessel lining, and aid in reversing atherosclerosis.  - pravastatin (PRAVACHOL) 20 MG tablet; Take 2 tablets by mouth Daily.  Dispense: 90 tablet; Refill: 2            This document has been electronically signed by MILLICENT Bhardwaj on January 30, 2019 3:02 PM

## 2019-02-19 ENCOUNTER — OFFICE VISIT (OUTPATIENT)
Dept: SLEEP MEDICINE | Facility: HOSPITAL | Age: 54
End: 2019-02-19

## 2019-02-19 VITALS
OXYGEN SATURATION: 98 % | HEIGHT: 70 IN | DIASTOLIC BLOOD PRESSURE: 78 MMHG | BODY MASS INDEX: 30.85 KG/M2 | SYSTOLIC BLOOD PRESSURE: 151 MMHG | HEART RATE: 70 BPM | WEIGHT: 215.5 LBS

## 2019-02-19 DIAGNOSIS — G47.33 OBSTRUCTIVE SLEEP APNEA, ADULT: Primary | ICD-10-CM

## 2019-02-19 PROCEDURE — 99213 OFFICE O/P EST LOW 20 MIN: CPT | Performed by: INTERNAL MEDICINE

## 2019-02-19 NOTE — PROGRESS NOTES
Sleep Clinic Follow Up    Date: 2019  Primary Care Physician: Ashley Watts MD    Last office visit: 2018 (I reviewed this note)    CC: Follow up: complex sleep apnea     Sleep Testin. PSG on 10/12/2016, AHI of 15.5  2. Currently on 20/9 cm H2O with 14 rate    Assessment and Plan:    1. Complex sleep apnea Established, stable (1)  1. Compliant and improved with PAP therapy  2. Continue PAP as prescribed.   3. Script for PAP supplies  2. 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 Was on klonopin - stopped but movement back so now back on 0.5 mg po TID    Interim History (1-3/4):  Since the last visit:    1) moderate complex sleep apnea -  Evangelist Gipson has remained compliant with Bipap S/T. He denies mask and machine issues, dry mouth, headaches, or pressures intolerance. He denies abnormal dreams, sleep paralysis, nasal congestion, URI sx.    PAP Data:    Time frame: 2018 - 2019   Compliance 100 %  Average use on days used: 11hrs 57 min  Percent of days with usage greater than or equal to 4 hours: 100%  PAP range : 20/9 with rate of 14 cm H2O  Average 90% pressure: 20/9 with rate of 14 cmH2O  Average AHI 4.0 events/hr  Mask type: Nasal mask  DME: [facility]    Bed time: 2230  Sleep latency: 30 minutes  Number of times awakens during the night: 0-2  Wake time: 0745  Estimated total sleep time at night: 8 hours  Caffeine intake: 16oz of coffee, 4oz of tea, and 0oz of soda  Alcohol intake: 0 drinks per week  Nap time: rare   Sleepiness with Driving: none    [place] - 12    2) Patient denies RLS symptoms.     PMHx, FH, SH reviewed and pertinent changes are: turbinate surgery and iritis      REVIEW OF SYSTEMS:   Negative for chest pain, fever, cough, SOA, abdominal pain. Smoking:none      Exam ():  Vitals:    19 1309   BP: 151/78   Pulse: 70   SpO2: 98%           19  1309   Weight: 97.8 kg (215 lb 8 oz)     Body mass index is 30.92 kg/m². Patient's  Body mass index is 30.92 kg/m². BMI is above normal parameters. Recommendations include: referral to primary care.      Gen:  No acute distress, alert, oriented  Lungs:  CTA with normal effort   CV:  RRR, no M/R/G  GI:  soft, non-tender  Psych:  Appropriate affect      Past Medical History:   Diagnosis Date   • Allergic contact dermatitis    • Allergic rhinitis    • Anesthesia     uncontrolled bp and paralysis   • Anesthesia     2014 increased bp after surgery and paralysis (C3 surgery)   • Anxiety    • Anxiety    • Arthritis    • Asthma    • Asthma    • Benign lipomatous neoplasm      Small lipoma clinically right chest wall      • Chest wall tenderness     right side of sternum   • Epilepsy (CMS/HCC)     myoclonic movement disorder   • Food allergy    • [place] de la Tourette's syndrome     neurology had been seeing him in [place]      • Headache    • Hypertension    • Hypertension    • Hypertension    • Insomnia    • Internal hemorrhoids    • Kyphosis    • Low back pain    • Malaise and fatigue    • Rhonda (CMS/HCC)    • Multiple joint pain    • Myoclonic disorder    • Need for influenza vaccination    • Neuralgia and neuritis    • OCD (obsessive compulsive disorder)    • Polyp of intestine     +FH, leiomyoma      • Psoriasis     psoriasis vs eczema vs fungal infections   • Radiculitis    • [place]resh [place] spotted fever    • Seborrheic dermatitis    • Skin sensation disturbance    • Spasm of back muscles    • Spinal stenosis of lumbar region    • Tinnitus    • Urinary frequency    • Wears glasses        Current Outpatient Medications:   •  albuterol (PROAIR RESPICLICK) 108 (90 BASE) MCG/ACT inhaler, Inhale 2 puffs every 4 (four) hours as needed for shortness of air., Disp: , Rfl:   •  amLODIPine (NORVASC) 10 MG tablet, Take 10 mg by mouth Daily., Disp: , Rfl:   •  aspirin 81 MG EC tablet, Take 81 mg by mouth Daily., Disp: , Rfl:   •  baclofen (LIORESAL) 20 MG tablet, TAKE ONE TABLET BY MOUTH THREE TIMES A DAY,  Disp: 90 tablet, Rfl: 0  •  cetirizine (zyrTEC) 10 MG tablet, Take 10 mg by mouth Daily., Disp: , Rfl:   •  chlorothiazide (DIURIL) 500 MG tablet, Take 500 mg by mouth Every Morning., Disp: , Rfl:   •  clonazePAM (KlonoPIN) 0.5 MG tablet, Take 0.5 mg by mouth 3 (Three) Times a Day., Disp: , Rfl:   •  cyclobenzaprine (FLEXERIL) 5 MG tablet, Take 5 mg by mouth 2 (Two) Times a Day As Needed for Muscle Spasms. 1-2 tablets, Disp: , Rfl:   •  gabapentin (NEURONTIN) 600 MG tablet, Take 1 tablet by mouth 3 (Three) Times a Day., Disp: 90 tablet, Rfl: 2  •  metoprolol tartrate (LOPRESSOR) 100 MG tablet, Take 100 mg by mouth 2 (Two) Times a Day., Disp: , Rfl:   •  metoprolol tartrate (LOPRESSOR) 25 MG tablet, Take 25 mg by mouth 2 (Two) Times a Day., Disp: , Rfl:   •  montelukast (SINGULAIR) 10 MG tablet, Take 10 mg by mouth Every Night., Disp: , Rfl:   •  Omega-3 Fatty Acids (FISH OIL) 1000 MG capsule capsule, Take 1,000 mg by mouth Daily With Breakfast., Disp: , Rfl:   •  pravastatin (PRAVACHOL) 20 MG tablet, Take 2 tablets by mouth Daily., Disp: 90 tablet, Rfl: 2  •  prednisoLONE acetate (PRED FORTE) 1 % ophthalmic suspension, INSTILL ONE DROP IN LEFT EYE AS DIRECTED, Disp: , Rfl: 1  •  Probiotic Product (PROBIOTIC DAILY PO), Take 1 tablet by mouth Daily., Disp: , Rfl:   •  promethazine (PHENERGAN) 25 MG tablet, TAKE 1 TABLET BY MOUTH EVERY 6 (SIX) HOURS AS NEEDED FOR NAUSEA OR VOMITING., Disp: 30 tablet, Rfl: 1  •  tamsulosin (FLOMAX) 0.4 MG capsule 24 hr capsule, Take 1 capsule by mouth Every Night., Disp: 30 capsule, Rfl: 2  •  traMADol (ULTRAM) 50 MG tablet, Take 50 mg by mouth Every 6 (Six) Hours As Needed for Moderate Pain . On hold as instructed per Dr. Lang, Disp: , Rfl:     Total time 15 min, more than half spent in face to face counseling and coordination of care.    RTC in 12 months     This document has been electronically signed by Juan Francisco Queen MD on February 19, 2019         CC: Ashley Watts,  MD          No ref. provider found

## 2019-04-25 RX ORDER — AZELASTINE HYDROCHLORIDE 137 UG/1
SPRAY, METERED NASAL
Qty: 30 ML | Refills: 11 | OUTPATIENT
Start: 2019-04-25

## 2019-04-25 RX ORDER — MOMETASONE FUROATE 50 UG/1
SPRAY, METERED NASAL
Qty: 17 G | Refills: 11 | OUTPATIENT
Start: 2019-04-25

## 2019-04-26 ENCOUNTER — APPOINTMENT (OUTPATIENT)
Dept: LAB | Facility: HOSPITAL | Age: 54
End: 2019-04-26

## 2019-04-26 ENCOUNTER — TRANSCRIBE ORDERS (OUTPATIENT)
Dept: LAB | Facility: HOSPITAL | Age: 54
End: 2019-04-26

## 2019-04-26 DIAGNOSIS — R53.83 FATIGUE, UNSPECIFIED TYPE: Primary | ICD-10-CM

## 2019-04-26 DIAGNOSIS — N28.9 URETERAL SLUDGE: ICD-10-CM

## 2019-04-26 PROCEDURE — 82607 VITAMIN B-12: CPT | Performed by: FAMILY MEDICINE

## 2019-04-26 PROCEDURE — 80048 BASIC METABOLIC PNL TOTAL CA: CPT | Performed by: FAMILY MEDICINE

## 2019-04-26 PROCEDURE — 36415 COLL VENOUS BLD VENIPUNCTURE: CPT | Performed by: FAMILY MEDICINE

## 2019-04-27 LAB
ANION GAP SERPL CALCULATED.3IONS-SCNC: 12.8 MMOL/L
BUN BLD-MCNC: 15 MG/DL (ref 6–20)
BUN/CREAT SERPL: 15 (ref 7–25)
CALCIUM SPEC-SCNC: 9.7 MG/DL (ref 8.6–10.5)
CHLORIDE SERPL-SCNC: 105 MMOL/L (ref 98–107)
CO2 SERPL-SCNC: 25.2 MMOL/L (ref 22–29)
CREAT BLD-MCNC: 1 MG/DL (ref 0.76–1.27)
GFR SERPL CREATININE-BSD FRML MDRD: 78 ML/MIN/1.73
GLUCOSE BLD-MCNC: 88 MG/DL (ref 65–99)
POTASSIUM BLD-SCNC: 3.9 MMOL/L (ref 3.5–5.2)
SODIUM BLD-SCNC: 143 MMOL/L (ref 136–145)
VIT B12 BLD-MCNC: 185 PG/ML (ref 211–946)

## 2019-05-03 ENCOUNTER — TRANSCRIBE ORDERS (OUTPATIENT)
Dept: LAB | Facility: HOSPITAL | Age: 54
End: 2019-05-03

## 2019-05-03 DIAGNOSIS — D51.8 OTHER VITAMIN B12 DEFICIENCY ANEMIA: Primary | ICD-10-CM

## 2019-05-22 ENCOUNTER — APPOINTMENT (OUTPATIENT)
Dept: LAB | Facility: HOSPITAL | Age: 54
End: 2019-05-22

## 2019-05-22 ENCOUNTER — TRANSCRIBE ORDERS (OUTPATIENT)
Dept: LAB | Facility: HOSPITAL | Age: 54
End: 2019-05-22

## 2019-05-22 DIAGNOSIS — R53.83 FATIGUE, UNSPECIFIED TYPE: Primary | ICD-10-CM

## 2019-05-22 LAB
ALBUMIN SERPL-MCNC: 4.9 G/DL (ref 3.5–5.2)
ALBUMIN/GLOB SERPL: 2.1 G/DL
ALP SERPL-CCNC: 66 U/L (ref 39–117)
ALT SERPL W P-5'-P-CCNC: 30 U/L (ref 1–41)
ANION GAP SERPL CALCULATED.3IONS-SCNC: 14.7 MMOL/L
AST SERPL-CCNC: 29 U/L (ref 1–40)
BASOPHILS # BLD AUTO: 0.03 10*3/MM3 (ref 0–0.2)
BASOPHILS NFR BLD AUTO: 0.5 % (ref 0–1.5)
BILIRUB SERPL-MCNC: 1 MG/DL (ref 0.2–1.2)
BUN BLD-MCNC: 13 MG/DL (ref 6–20)
BUN/CREAT SERPL: 10.5 (ref 7–25)
CALCIUM SPEC-SCNC: 9.7 MG/DL (ref 8.6–10.5)
CHLORIDE SERPL-SCNC: 101 MMOL/L (ref 98–107)
CO2 SERPL-SCNC: 26.3 MMOL/L (ref 22–29)
CREAT BLD-MCNC: 1.24 MG/DL (ref 0.76–1.27)
DEPRECATED RDW RBC AUTO: 45.4 FL (ref 37–54)
EOSINOPHIL # BLD AUTO: 0.19 10*3/MM3 (ref 0–0.4)
EOSINOPHIL NFR BLD AUTO: 3.3 % (ref 0.3–6.2)
ERYTHROCYTE [DISTWIDTH] IN BLOOD BY AUTOMATED COUNT: 13.4 % (ref 12.3–15.4)
GFR SERPL CREATININE-BSD FRML MDRD: 61 ML/MIN/1.73
GLOBULIN UR ELPH-MCNC: 2.3 GM/DL
GLUCOSE BLD-MCNC: 101 MG/DL (ref 65–99)
HCT VFR BLD AUTO: 43.9 % (ref 37.5–51)
HCYS SERPL-MCNC: 11.8 UMOL/L (ref 0–15)
HGB BLD-MCNC: 14.9 G/DL (ref 13–17.7)
IMM GRANULOCYTES # BLD AUTO: 0.01 10*3/MM3 (ref 0–0.05)
IMM GRANULOCYTES NFR BLD AUTO: 0.2 % (ref 0–0.5)
LYMPHOCYTES # BLD AUTO: 1.61 10*3/MM3 (ref 0.7–3.1)
LYMPHOCYTES NFR BLD AUTO: 28.1 % (ref 19.6–45.3)
MCH RBC QN AUTO: 31.1 PG (ref 26.6–33)
MCHC RBC AUTO-ENTMCNC: 33.9 G/DL (ref 31.5–35.7)
MCV RBC AUTO: 91.6 FL (ref 79–97)
MONOCYTES # BLD AUTO: 0.59 10*3/MM3 (ref 0.1–0.9)
MONOCYTES NFR BLD AUTO: 10.3 % (ref 5–12)
NEUTROPHILS # BLD AUTO: 3.3 10*3/MM3 (ref 1.7–7)
NEUTROPHILS NFR BLD AUTO: 57.6 % (ref 42.7–76)
NRBC BLD AUTO-RTO: 0 /100 WBC (ref 0–0.2)
PLATELET # BLD AUTO: 205 10*3/MM3 (ref 140–450)
PMV BLD AUTO: 11.5 FL (ref 6–12)
POTASSIUM BLD-SCNC: 3.8 MMOL/L (ref 3.5–5.2)
PROT SERPL-MCNC: 7.2 G/DL (ref 6–8.5)
RBC # BLD AUTO: 4.79 10*6/MM3 (ref 4.14–5.8)
SODIUM BLD-SCNC: 142 MMOL/L (ref 136–145)
WBC NRBC COR # BLD: 5.73 10*3/MM3 (ref 3.4–10.8)

## 2019-05-22 PROCEDURE — 86618 LYME DISEASE ANTIBODY: CPT | Performed by: FAMILY MEDICINE

## 2019-05-22 PROCEDURE — 36415 COLL VENOUS BLD VENIPUNCTURE: CPT | Performed by: FAMILY MEDICINE

## 2019-05-22 PROCEDURE — 86617 LYME DISEASE ANTIBODY: CPT | Performed by: FAMILY MEDICINE

## 2019-05-22 PROCEDURE — 85025 COMPLETE CBC W/AUTO DIFF WBC: CPT | Performed by: FAMILY MEDICINE

## 2019-05-22 PROCEDURE — 80053 COMPREHEN METABOLIC PANEL: CPT | Performed by: FAMILY MEDICINE

## 2019-05-22 PROCEDURE — 83921 ORGANIC ACID SINGLE QUANT: CPT | Performed by: FAMILY MEDICINE

## 2019-05-22 PROCEDURE — 83090 ASSAY OF HOMOCYSTEINE: CPT | Performed by: FAMILY MEDICINE

## 2019-05-23 LAB — B BURGDOR IGG+IGM SER-ACNC: <0.91 ISR (ref 0–0.9)

## 2019-05-27 LAB
B BURGDOR IGG PATRN SER IB-IMP: NEGATIVE
B BURGDOR IGM PATRN SER IB-IMP: NEGATIVE
B BURGDOR18KD IGG SER QL IB: NORMAL
B BURGDOR23KD IGG SER QL IB: NORMAL
B BURGDOR23KD IGM SER QL IB: NORMAL
B BURGDOR28KD IGG SER QL IB: NORMAL
B BURGDOR30KD IGG SER QL IB: NORMAL
B BURGDOR39KD IGG SER QL IB: NORMAL
B BURGDOR39KD IGM SER QL IB: NORMAL
B BURGDOR41KD IGG SER QL IB: NORMAL
B BURGDOR41KD IGM SER QL IB: NORMAL
B BURGDOR45KD IGG SER QL IB: NORMAL
B BURGDOR58KD IGG SER QL IB: NORMAL
B BURGDOR66KD IGG SER QL IB: NORMAL
B BURGDOR93KD IGG SER QL IB: NORMAL

## 2019-05-28 LAB
Lab: NORMAL
METHYLMALONATE SERPL-SCNC: 196 NMOL/L (ref 0–378)

## 2019-06-05 ENCOUNTER — TRANSCRIBE ORDERS (OUTPATIENT)
Dept: LAB | Facility: HOSPITAL | Age: 54
End: 2019-06-05

## 2019-06-05 DIAGNOSIS — R34 DECREASED URINE OUTPUT: Primary | ICD-10-CM

## 2019-06-10 ENCOUNTER — APPOINTMENT (OUTPATIENT)
Dept: LAB | Facility: HOSPITAL | Age: 54
End: 2019-06-10

## 2019-06-10 LAB
ANION GAP SERPL CALCULATED.3IONS-SCNC: 16.2 MMOL/L
BILIRUB UR QL STRIP: NEGATIVE
BUN BLD-MCNC: 12 MG/DL (ref 6–20)
BUN/CREAT SERPL: 12.1 (ref 7–25)
CALCIUM SPEC-SCNC: 10.1 MG/DL (ref 8.6–10.5)
CHLORIDE SERPL-SCNC: 100 MMOL/L (ref 98–107)
CLARITY UR: CLEAR
CO2 SERPL-SCNC: 25.8 MMOL/L (ref 22–29)
COLOR UR: YELLOW
CREAT BLD-MCNC: 0.99 MG/DL (ref 0.76–1.27)
FOLATE SERPL-MCNC: >20 NG/ML (ref 4.78–24.2)
GFR SERPL CREATININE-BSD FRML MDRD: 78 ML/MIN/1.73
GLUCOSE BLD-MCNC: 92 MG/DL (ref 65–99)
GLUCOSE UR STRIP-MCNC: NEGATIVE MG/DL
HGB UR QL STRIP.AUTO: NEGATIVE
KETONES UR QL STRIP: NEGATIVE
LEUKOCYTE ESTERASE UR QL STRIP.AUTO: NEGATIVE
NITRITE UR QL STRIP: NEGATIVE
PH UR STRIP.AUTO: 8 [PH] (ref 5–8)
POTASSIUM BLD-SCNC: 3.6 MMOL/L (ref 3.5–5.2)
PROT UR QL STRIP: NEGATIVE
SODIUM BLD-SCNC: 142 MMOL/L (ref 136–145)
SP GR UR STRIP: 1.02 (ref 1–1.03)
UROBILINOGEN UR QL STRIP: NORMAL
VIT B12 BLD-MCNC: 734 PG/ML (ref 211–946)

## 2019-06-10 PROCEDURE — 36415 COLL VENOUS BLD VENIPUNCTURE: CPT | Performed by: FAMILY MEDICINE

## 2019-06-10 PROCEDURE — 82607 VITAMIN B-12: CPT | Performed by: FAMILY MEDICINE

## 2019-06-10 PROCEDURE — 82746 ASSAY OF FOLIC ACID SERUM: CPT | Performed by: FAMILY MEDICINE

## 2019-06-10 PROCEDURE — 80048 BASIC METABOLIC PNL TOTAL CA: CPT | Performed by: FAMILY MEDICINE

## 2019-06-10 PROCEDURE — 81003 URINALYSIS AUTO W/O SCOPE: CPT | Performed by: FAMILY MEDICINE

## 2019-07-26 ENCOUNTER — TRANSCRIBE ORDERS (OUTPATIENT)
Dept: LAB | Facility: HOSPITAL | Age: 54
End: 2019-07-26

## 2019-07-26 ENCOUNTER — LAB (OUTPATIENT)
Dept: LAB | Facility: HOSPITAL | Age: 54
End: 2019-07-26

## 2019-07-26 DIAGNOSIS — M62.81 MUSCLE WEAKNESS (GENERALIZED): ICD-10-CM

## 2019-07-26 DIAGNOSIS — M62.81 MUSCLE WEAKNESS (GENERALIZED): Primary | ICD-10-CM

## 2019-07-26 PROCEDURE — 84156 ASSAY OF PROTEIN URINE: CPT

## 2019-07-26 PROCEDURE — 86335 IMMUNFIX E-PHORSIS/URINE/CSF: CPT

## 2019-07-26 PROCEDURE — 84166 PROTEIN E-PHORESIS/URINE/CSF: CPT

## 2019-07-26 PROCEDURE — 81050 URINALYSIS VOLUME MEASURE: CPT

## 2019-07-30 ENCOUNTER — LAB (OUTPATIENT)
Dept: LAB | Facility: HOSPITAL | Age: 54
End: 2019-07-30

## 2019-07-30 ENCOUNTER — TRANSCRIBE ORDERS (OUTPATIENT)
Dept: LAB | Facility: HOSPITAL | Age: 54
End: 2019-07-30

## 2019-07-30 DIAGNOSIS — M62.81 MUSCLE WEAKNESS: ICD-10-CM

## 2019-07-30 DIAGNOSIS — R34 DECREASED URINE OUTPUT: ICD-10-CM

## 2019-07-30 DIAGNOSIS — M79.10 MYALGIA: ICD-10-CM

## 2019-07-30 DIAGNOSIS — R34 DECREASED URINE OUTPUT: Primary | ICD-10-CM

## 2019-07-30 DIAGNOSIS — M79.10 MYALGIA: Primary | ICD-10-CM

## 2019-07-30 LAB
ALBUMIN 24H MFR UR ELPH: 36.5 %
ALPHA1 GLOB 24H MFR UR ELPH: 9.8 %
ALPHA2 GLOB 24H MFR UR ELPH: 17.2 %
ANION GAP SERPL CALCULATED.3IONS-SCNC: 14.4 MMOL/L (ref 5–15)
B-GLOBULIN MFR UR ELPH: 27.1 %
BILIRUB UR QL STRIP: NEGATIVE
BUN BLD-MCNC: 15 MG/DL (ref 6–20)
BUN/CREAT SERPL: 13.4 (ref 7–25)
CALCIUM SPEC-SCNC: 10 MG/DL (ref 8.6–10.5)
CHLORIDE SERPL-SCNC: 100 MMOL/L (ref 98–107)
CLARITY UR: CLEAR
CO2 SERPL-SCNC: 25.6 MMOL/L (ref 22–29)
COLOR UR: YELLOW
CREAT BLD-MCNC: 1.12 MG/DL (ref 0.76–1.27)
GAMMA GLOB 24H MFR UR ELPH: 9.4 %
GFR SERPL CREATININE-BSD FRML MDRD: 68 ML/MIN/1.73
GLUCOSE BLD-MCNC: 107 MG/DL (ref 65–99)
GLUCOSE UR STRIP-MCNC: NEGATIVE MG/DL
HAV IGM SERPL QL IA: NORMAL
HBV CORE IGM SERPL QL IA: NORMAL
HBV SURFACE AG SERPL QL IA: NORMAL
HCV AB SER DONR QL: NORMAL
HGB UR QL STRIP.AUTO: NEGATIVE
HIV 1 & 2 AB SER-IMP: NORMAL
INTERPRETATION UR IFE-IMP: NORMAL
KETONES UR QL STRIP: NEGATIVE
LEUKOCYTE ESTERASE UR QL STRIP.AUTO: NEGATIVE
M PROTEIN 24H MFR UR ELPH: NORMAL %
NITRITE UR QL STRIP: NEGATIVE
PH UR STRIP.AUTO: 7 [PH] (ref 5–8)
POTASSIUM BLD-SCNC: 3.7 MMOL/L (ref 3.5–5.2)
PROT 24H UR-MRATE: 115 MG/24 HR (ref 30–150)
PROT UR QL STRIP: NEGATIVE
PROT UR-MCNC: 10 MG/DL
SODIUM BLD-SCNC: 140 MMOL/L (ref 136–145)
SP GR UR STRIP: 1.01 (ref 1–1.03)
UROBILINOGEN UR QL STRIP: NORMAL

## 2019-07-30 PROCEDURE — 84244 ASSAY OF RENIN: CPT

## 2019-07-30 PROCEDURE — 86160 COMPLEMENT ANTIGEN: CPT

## 2019-07-30 PROCEDURE — 87086 URINE CULTURE/COLONY COUNT: CPT

## 2019-07-30 PROCEDURE — 86618 LYME DISEASE ANTIBODY: CPT

## 2019-07-30 PROCEDURE — 82164 ANGIOTENSIN I ENZYME TEST: CPT

## 2019-07-30 PROCEDURE — 80048 BASIC METABOLIC PNL TOTAL CA: CPT

## 2019-07-30 PROCEDURE — 80074 ACUTE HEPATITIS PANEL: CPT

## 2019-07-30 PROCEDURE — 36415 COLL VENOUS BLD VENIPUNCTURE: CPT

## 2019-07-30 PROCEDURE — 81003 URINALYSIS AUTO W/O SCOPE: CPT

## 2019-07-30 PROCEDURE — 86617 LYME DISEASE ANTIBODY: CPT

## 2019-07-31 LAB
ACE SERPL-CCNC: 56 U/L (ref 14–82)
B BURGDOR IGG+IGM SER-ACNC: <0.91 ISR (ref 0–0.9)
BACTERIA SPEC AEROBE CULT: NO GROWTH
C3 SERPL-MCNC: 154 MG/DL (ref 82–167)
C4 SERPL-MCNC: 37 MG/DL (ref 14–44)

## 2019-08-02 LAB — RENIN PLAS-CCNC: 32.38 NG/ML/HR (ref 0.17–5.38)

## 2019-08-21 ENCOUNTER — HOSPITAL ENCOUNTER (OUTPATIENT)
Dept: MRI IMAGING | Facility: HOSPITAL | Age: 54
Discharge: HOME | End: 2019-08-21
Admitting: FAMILY MEDICINE

## 2019-08-21 DIAGNOSIS — R55 SYNCOPE, UNSPECIFIED SYNCOPE TYPE: ICD-10-CM

## 2019-08-21 DIAGNOSIS — M62.81 MUSCLE WEAKNESS (GENERALIZED): ICD-10-CM

## 2019-08-21 PROCEDURE — A9576 INJ PROHANCE MULTIPACK: HCPCS | Performed by: FAMILY MEDICINE

## 2019-08-21 PROCEDURE — 70553 MRI BRAIN STEM W/O & W/DYE: CPT

## 2019-08-21 PROCEDURE — 25010000002 GADOTERIDOL PER 1 ML: Performed by: FAMILY MEDICINE

## 2019-08-21 RX ADMIN — GADOTERIDOL 20 ML: 279.3 INJECTION, SOLUTION INTRAVENOUS at 09:34

## 2019-09-13 ENCOUNTER — TRANSCRIBE ORDERS (OUTPATIENT)
Dept: LAB | Facility: HOSPITAL | Age: 54
End: 2019-09-13

## 2019-09-13 DIAGNOSIS — M10.9 GOUT, UNSPECIFIED CAUSE, UNSPECIFIED CHRONICITY, UNSPECIFIED SITE: Primary | ICD-10-CM

## 2019-09-16 ENCOUNTER — LAB (OUTPATIENT)
Dept: LAB | Facility: HOSPITAL | Age: 54
End: 2019-09-16

## 2019-09-16 DIAGNOSIS — M10.9 GOUT, UNSPECIFIED CAUSE, UNSPECIFIED CHRONICITY, UNSPECIFIED SITE: ICD-10-CM

## 2019-09-16 LAB — URATE SERPL-MCNC: 6.6 MG/DL (ref 3.4–7)

## 2019-09-16 PROCEDURE — 36415 COLL VENOUS BLD VENIPUNCTURE: CPT

## 2019-09-16 PROCEDURE — 84550 ASSAY OF BLOOD/URIC ACID: CPT

## 2019-09-30 ENCOUNTER — LAB (OUTPATIENT)
Dept: LAB | Facility: HOSPITAL | Age: 54
End: 2019-09-30

## 2019-09-30 ENCOUNTER — TRANSCRIBE ORDERS (OUTPATIENT)
Dept: LAB | Facility: HOSPITAL | Age: 54
End: 2019-09-30

## 2019-09-30 ENCOUNTER — TRANSCRIBE ORDERS (OUTPATIENT)
Dept: PHYSICAL THERAPY | Facility: HOSPITAL | Age: 54
End: 2019-09-30

## 2019-09-30 DIAGNOSIS — M43.22 CERVICAL VERTEBRAL FUSION: ICD-10-CM

## 2019-09-30 DIAGNOSIS — G25.3 MYOCLONUS: ICD-10-CM

## 2019-09-30 DIAGNOSIS — G95.9 DISEASE OF SPINAL CORD (HCC): ICD-10-CM

## 2019-09-30 DIAGNOSIS — G25.3 MYOCLONUS: Primary | ICD-10-CM

## 2019-09-30 DIAGNOSIS — G25.3 MYOCLONIA: Primary | ICD-10-CM

## 2019-09-30 DIAGNOSIS — R26.9 NEUROLOGIC GAIT DYSFUNCTION: ICD-10-CM

## 2019-09-30 DIAGNOSIS — R26.89 BALANCE PROBLEM: ICD-10-CM

## 2019-09-30 LAB
CERULOPLASMIN SERPL-MCNC: 24 MG/DL (ref 16–31)
TSH SERPL DL<=0.05 MIU/L-ACNC: 1.25 UIU/ML (ref 0.27–4.2)

## 2019-09-30 PROCEDURE — 82390 ASSAY OF CERULOPLASMIN: CPT

## 2019-09-30 PROCEDURE — 82525 ASSAY OF COPPER: CPT

## 2019-09-30 PROCEDURE — 36415 COLL VENOUS BLD VENIPUNCTURE: CPT

## 2019-09-30 PROCEDURE — 84443 ASSAY THYROID STIM HORMONE: CPT | Performed by: NURSE PRACTITIONER

## 2019-10-02 LAB — COPPER SERPL-MCNC: 100 UG/DL (ref 72–166)

## 2019-10-14 ENCOUNTER — HOSPITAL ENCOUNTER (OUTPATIENT)
Dept: MRI IMAGING | Facility: HOSPITAL | Age: 54
Discharge: HOME | End: 2019-10-14

## 2019-10-14 ENCOUNTER — HOSPITAL ENCOUNTER (OUTPATIENT)
Dept: MRI IMAGING | Facility: HOSPITAL | Age: 54
Discharge: HOME | End: 2019-10-14
Admitting: NURSE PRACTITIONER

## 2019-10-14 DIAGNOSIS — G25.3 MYOCLONUS: ICD-10-CM

## 2019-10-14 PROCEDURE — 72146 MRI CHEST SPINE W/O DYE: CPT

## 2019-10-14 PROCEDURE — 72141 MRI NECK SPINE W/O DYE: CPT

## 2019-10-14 PROCEDURE — 72148 MRI LUMBAR SPINE W/O DYE: CPT

## 2019-12-09 RX ORDER — AZELASTINE HYDROCHLORIDE 137 UG/1
SPRAY, METERED NASAL
Qty: 30 ML | Refills: 11 | OUTPATIENT
Start: 2019-12-09

## 2020-01-30 ENCOUNTER — OFFICE VISIT (OUTPATIENT)
Dept: CARDIAC SURGERY | Facility: CLINIC | Age: 55
End: 2020-01-30

## 2020-01-30 VITALS
WEIGHT: 198.8 LBS | SYSTOLIC BLOOD PRESSURE: 132 MMHG | OXYGEN SATURATION: 99 % | TEMPERATURE: 98.3 F | HEIGHT: 69 IN | HEART RATE: 70 BPM | DIASTOLIC BLOOD PRESSURE: 84 MMHG | BODY MASS INDEX: 29.44 KG/M2

## 2020-01-30 DIAGNOSIS — I10 ESSENTIAL HYPERTENSION: ICD-10-CM

## 2020-01-30 DIAGNOSIS — I65.22 STENOSIS OF LEFT CAROTID ARTERY: Primary | ICD-10-CM

## 2020-01-30 DIAGNOSIS — R07.89 ATYPICAL CHEST PAIN: ICD-10-CM

## 2020-01-30 DIAGNOSIS — E78.5 HYPERLIPIDEMIA, UNSPECIFIED HYPERLIPIDEMIA TYPE: ICD-10-CM

## 2020-01-30 PROCEDURE — 99214 OFFICE O/P EST MOD 30 MIN: CPT | Performed by: NURSE PRACTITIONER

## 2020-01-30 RX ORDER — PRAVASTATIN SODIUM 20 MG
40 TABLET ORAL DAILY
Qty: 90 TABLET | Refills: 3
Start: 2020-01-30 | End: 2021-03-03

## 2020-01-30 RX ORDER — CYANOCOBALAMIN 1000 UG/ML
INJECTION, SOLUTION INTRAMUSCULAR; SUBCUTANEOUS
COMMUNITY
Start: 2020-01-06

## 2020-01-30 RX ORDER — ACETAMINOPHEN AND CODEINE PHOSPHATE 300; 30 MG/1; MG/1
TABLET ORAL
COMMUNITY
Start: 2020-01-21

## 2020-01-30 RX ORDER — BETAMETHASONE DIPROPIONATE 0.5 MG/G
LOTION TOPICAL
COMMUNITY

## 2020-01-30 NOTE — PATIENT INSTRUCTIONS
mild Carotid Artery Stenosis bilateral remained stable Asymptomatic   Medical Management: ASA,STATIN   If you should experience any neurological symptoms including but not limited to visual or speech disturbances confusion, seizures, or weakness of limbs of one side of your body notify Heart and Vascular center immediately for evaluation or if after hours present to the nearest Emergency Department.    Return 1 year      Uncontrolled Hypertension/chest pain  -Referral Cardiology

## 2020-01-30 NOTE — PROGRESS NOTES
CVTS Office Progress Note       Subjective   Patient ID: Evangelist Gipson is a 54 y.o. male is here today for follow-up.    Chief Complaint:    Chief Complaint   Patient presents with   • Carotid Artery Disease       The following portions of the patient's history were reviewed and updated as appropriate: allergies, current medications, past family history, past medical history, past social history, past surgical history and problem list.  Recent images independently reviewed.  Available laboratory values reviewed.    PCP:  Ashley Watts MD  Cardiology:  [facility] ([facility])    54 y.o. male with HTN, carotid stenosis, CAD,  tourettes, DJD, anxiety, chronic back pain, DAVY.  former smoker.  Moderate vibration head when bending over x 6 months.  Abnormal MRI brain.  Controlled sleep apnea on bipap x 2 months.  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  No other associated signs, symptoms or modifying factors.    12/2014 Carotid Duplex:  PAXTON 0-15% (85cm/s), LICA 0-15% (122cm/s)  7/2016 Carotid Duplex:  PAXTON 0-15% (76cm/s) antegrade vert, LICA 0-15% (68cm/s) antegrade vert  7/2016 MRI Neck: PAXTON small ulceration posterior medial proximal ICA.  LICA 35%  7/2016 MRI Brain:  Mild microvascular disease.  11mm LEFT maxillary sinus retention cyst.  1/24/18: Carotid Duplex: PAXTON 0-49% LICA 0-49%. Antegrade  1/29/19: Carotid duplex: PAXTON 0-49% (72cm/s) LICA 0-49% (82cm/s) Antegrade  1/30/2020 Carotid Duplex: RIGHT 0-49% (91/0.8) LEFT 0-49% (102/0.9) Antegrade         Past Medical History:   Diagnosis Date   • Allergic contact dermatitis    • Allergic rhinitis    • Anesthesia     uncontrolled bp and paralysis   • Anesthesia     2014 increased bp after surgery and paralysis (C3 surgery)   • Anxiety    • Anxiety    • Arthritis    • Asthma    • Asthma    • Benign lipomatous neoplasm      Small lipoma clinically right chest wall      • Chest wall tenderness     right side of sternum   • Epilepsy  (CMS/HCC)     myoclonic movement disorder   • Food allergy    • [place] de [place] Tourette's syndrome     neurology had been seeing him in [place]      • Headache    • Hypertension    • Hypertension    • Hypertension    • Insomnia    • Internal hemorrhoids    • Kyphosis    • Low back pain    • Malaise and fatigue    • Rhonda (CMS/HCC)    • Multiple joint pain    • Myoclonic disorder    • Need for influenza vaccination    • Neuralgia and neuritis    • OCD (obsessive compulsive disorder)    • Polyp of intestine     +FH, leiomyoma      • Psoriasis     psoriasis vs eczema vs fungal infections   • Radiculitis    • [place] spotted fever    • Seborrheic dermatitis    • Skin sensation disturbance    • Spasm of back muscles    • Spinal stenosis of lumbar region    • Tinnitus    • Urinary frequency    • Wears glasses      Past Surgical History:   Procedure Laterality Date   • BACK SURGERY     • CERVICAL DISC SURGERY      c3 , placed cage and titinium talha   • COLONOSCOPY W/ POLYPECTOMY  05/27/2015    A single polyp was found in the colon; removed by snare cautery polypectomy. Internal and external hemorrhoids found.   • ENDOSCOPY  05/27/2015    Esophagitis seen. Biopsy taken. Gastritis found in the body of the stomach. Biopsy taken. Normal duodenum.   • INCISION AND DRAINAGE ABSCESS  09/24/2013   • INJECTION OF MEDICATION  02/29/2016    Depo Medrol (Methylprednisone)   • INJECTION OF MEDICATION  06/23/2016    Kenalog   • INJECTION OF MEDICATION  10/21/2015    Toradol   • NASAL SEPTOPLASTY W/ TURBINOPLASTY Bilateral 7/3/2018    Procedure: NASAL VALVE AND NASAL SEPTAL REPAIR , BILATERAL TURBINOPLASTIES, BILATERAL ENDOSCPIC  CONCH BULLOSA SURGERIES                     (LATEX ALLERGY);  Surgeon: Sagar Lang MD;  Location: [facility];  Service: ENT   • PROCEDURE GENERIC CONVERTED  03/21/2016    BMI NOT DOC NO REASON GIVEN    • PROCEDURE GENERIC CONVERTED  05/04/2016    CT OF SINUS ORD W/SINUSITIS DX FOR DOC RSN    •  PROCEDURE GENERIC CONVERTED  2015    MOST RECENT SYSTOLIC BP < 140 mmHg    • PROCEDURE GENERIC CONVERTED  2016    TOBACCO NON-USER    • SKIN BIOPSY  2016   • STEROID INJECTION  2016    Celestone (betamethasone)     Family History   Problem Relation Age of Onset   • Heart disease Mother    • Osteoporosis Mother    • Lung cancer Father    • Prostate cancer Father    • Coronary artery disease Father    • COPD Father    • Hyperlipidemia Father    • Hypertension Father    • Heart failure Father    • Diabetes Maternal Grandmother    • Hypertension Other    • Cancer Other    • Alcohol abuse Brother    • Coronary artery disease Brother    • COPD Brother    • Mental illness Brother      Social History     Tobacco Use   • Smoking status: Former Smoker     Last attempt to quit:      Years since quittin.0   • Smokeless tobacco: Never Used   Substance Use Topics   • Alcohol use: No   • Drug use: No       ALLERGIES:   Gelatin; Ambien [zolpidem tartrate]; Beef-derived products; Dairy aid [lactase]; Latex; Other; Pork-derived products; and Remeron [mirtazapine]    MEDICATIONS:      Current Outpatient Medications:   •  acetaminophen-codeine (TYLENOL #3) 300-30 MG per tablet, , Disp: , Rfl:   •  albuterol (PROAIR RESPICLICK) 108 (90 BASE) MCG/ACT inhaler, Inhale 2 puffs every 4 (four) hours as needed for shortness of air., Disp: , Rfl:   •  amLODIPine (NORVASC) 10 MG tablet, Take 10 mg by mouth Daily., Disp: , Rfl:   •  aspirin 81 MG EC tablet, Take 81 mg by mouth Daily., Disp: , Rfl:   •  Azilsartan-Chlorthalidone (EDARBYCLOR) 40-25 MG tablet, Edarbyclor 40 mg-25 mg tablet  TAKE ONE TABLET BY MOUTH DAILY, Disp: , Rfl:   •  baclofen (LIORESAL) 20 MG tablet, TAKE ONE TABLET BY MOUTH THREE TIMES A DAY, Disp: 90 tablet, Rfl: 0  •  betamethasone dipropionate (DIPROLENE) 0.05 % lotion, betamethasone dipropionate 0.05 % lotion  APPLY TO AFFECTED AREAS OF SCALP BY TOPICAL ROUTE 1-2 TIMES PER DAY ; RUB IN  GENTLY AND COMPLETELY. DO NOT RINSE OUT., Disp: , Rfl:   •  cetirizine (zyrTEC) 10 MG tablet, Take 10 mg by mouth Daily., Disp: , Rfl:   •  chlorothiazide (DIURIL) 500 MG tablet, Take 500 mg by mouth Every Morning., Disp: , Rfl:   •  clonazePAM (KlonoPIN) 0.5 MG tablet, Take 0.5 mg by mouth 3 (Three) Times a Day., Disp: , Rfl:   •  cyanocobalamin 1000 MCG/ML injection, , Disp: , Rfl:   •  cyclobenzaprine (FLEXERIL) 5 MG tablet, Take 5 mg by mouth 2 (Two) Times a Day As Needed for Muscle Spasms. 1-2 tablets, Disp: , Rfl:   •  gabapentin (NEURONTIN) 600 MG tablet, Take 1 tablet by mouth 3 (Three) Times a Day., Disp: 90 tablet, Rfl: 2  •  montelukast (SINGULAIR) 10 MG tablet, Take 10 mg by mouth Every Night., Disp: , Rfl:   •  Omega-3 Fatty Acids (FISH OIL) 1000 MG capsule capsule, Take 1,000 mg by mouth Daily With Breakfast., Disp: , Rfl:   •  pravastatin (PRAVACHOL) 20 MG tablet, Take 2 tablets by mouth Daily., Disp: 90 tablet, Rfl: 3  •  prednisoLONE acetate (PRED FORTE) 1 % ophthalmic suspension, INSTILL ONE DROP IN LEFT EYE AS DIRECTED, Disp: , Rfl: 1  •  Probiotic Product (PROBIOTIC DAILY PO), Take 1 tablet by mouth Daily., Disp: , Rfl:   •  promethazine (PHENERGAN) 25 MG tablet, TAKE 1 TABLET BY MOUTH EVERY 6 (SIX) HOURS AS NEEDED FOR NAUSEA OR VOMITING., Disp: 30 tablet, Rfl: 1  •  tamsulosin (FLOMAX) 0.4 MG capsule 24 hr capsule, Take 1 capsule by mouth Every Night., Disp: 30 capsule, Rfl: 2    Review of Systems   Constitution: Positive for malaise/fatigue.   Eyes: Negative for visual disturbance.   Cardiovascular: Positive for chest pain. Negative for claudication and cyanosis.   Respiratory: Negative for shortness of breath.    Skin: Negative for color change and nail changes.   Musculoskeletal: Positive for arthritis, back pain and joint pain. Negative for muscle weakness.   Gastrointestinal: Negative for dysphagia.   Neurological: Negative for focal weakness, light-headedness, loss of balance, numbness,  paresthesias and weakness.   Psychiatric/Behavioral: Negative for altered mental status. The patient is nervous/anxious.    All other systems reviewed and are negative.       Objective   Temp:  [98.3 °F (36.8 °C)] 98.3 °F (36.8 °C)  Heart Rate:  [70] 70  BP: (132)/(84) 132/84  Body mass index is 29.36 kg/m².  Physical Exam   Constitutional: He is oriented to person, place, and time. He appears well-nourished.   HENT:   Head: Atraumatic.   Eyes: EOM are normal.   Neck: Neck supple. Carotid bruit is not present.   Cardiovascular: Normal rate and normal heart sounds.   Pulses:       Dorsalis pedis pulses are 2+ on the right side, and 2+ on the left side.        Posterior tibial pulses are 2+ on the right side, and 2+ on the left side.   Pulmonary/Chest: Effort normal and breath sounds normal.   Abdominal: Soft. Bowel sounds are normal.   Musculoskeletal: Normal range of motion. He exhibits no edema.   Gait normal   Neurological: He is alert and oriented to person, place, and time.   Skin: Skin is warm and dry. Capillary refill takes less than 2 seconds.   No venous staining   Psychiatric: He has a normal mood and affect. Thought content normal.   Vitals reviewed.        Assessment & Plan     Independent Review of Radiographic Studies:  Detailed discussion regarding risks, benefits, and treatment plan. Images independently reviewed. Patient understands, agrees, and wishes to proceed with plan.     1. Stenosis of left carotid artery  mild Carotid Artery Stenosis bilateral remained stable Asymptomatic   Medical Management: ASA,STATIN   If you should experience any neurological symptoms including but not limited to visual or speech disturbances confusion, seizures, or weakness of limbs of one side of your body notify Heart and Vascular center immediately for evaluation or if after hours present to the nearest Emergency Department.    Return 1 year  - Duplex Carotid Ultrasound CAR; Future    2. Essential  hypertension  uncontrolled  - Ambulatory Referral to Cardiology    3. Atypical chest pain  uncontrolled  - Ambulatory Referral to Cardiology    4. Hyperlipidemia, unspecified hyperlipidemia type  Lipid-lowering therapy has been proven beneficial in patients with vascular disease. Current guidelines recommend statin treatment for all patients with PAD and carotid stenosis. Statins are beneficial in preventing cardiovascular events, increasing functional capacity and lower the risk of adverse limb loss in PAD. Statins decrease the progression of plaque formation and may improve peripheral vessel lining, and aid in reversing atherosclerosis.  - pravastatin (PRAVACHOL) 20 MG tablet; Take 2 tablets by mouth Daily.  Dispense: 90 tablet; Refill: 3            This document has been electronically signed by MILLICENT Bhardwaj on January 30, 2020 2:01 PM

## 2020-02-26 ENCOUNTER — OFFICE VISIT (OUTPATIENT)
Dept: SLEEP MEDICINE | Facility: HOSPITAL | Age: 55
End: 2020-02-26

## 2020-02-26 VITALS
HEIGHT: 69 IN | BODY MASS INDEX: 29.47 KG/M2 | HEART RATE: 71 BPM | DIASTOLIC BLOOD PRESSURE: 81 MMHG | OXYGEN SATURATION: 98 % | SYSTOLIC BLOOD PRESSURE: 138 MMHG | WEIGHT: 199 LBS

## 2020-02-26 DIAGNOSIS — G47.31 COMPLEX SLEEP APNEA SYNDROME: Primary | ICD-10-CM

## 2020-02-26 PROCEDURE — 99214 OFFICE O/P EST MOD 30 MIN: CPT | Performed by: NURSE PRACTITIONER

## 2020-02-26 RX ORDER — POTASSIUM CHLORIDE 1500 MG/1
20 TABLET, FILM COATED, EXTENDED RELEASE ORAL 2 TIMES DAILY
COMMUNITY

## 2020-02-26 NOTE — PROGRESS NOTES
Sleep Clinic Follow Up    Date: 2020  Primary Care Physician: Ashley Watts MD    Last office visit: 2019 (I reviewed this note)    CC: Follow up: Complex sleep apnea on BiPAP      Interim History:  Since the last visit:    1) moderate complex sleep apnea -  Evangelist Gipson has remained compliant with BiPAP. He denies mask and machine issues, dry mouth, headaches, or pressures intolerance. He denies abnormal dreams, sleep paralysis, nasal congestion, URI sx. Patient states that he wants to try a different mask without so many straps.    Sleep Testin. PSG on 10/12/2016, AHI of 15.5   2. Currently on 20/9 cm H2O, rate of 14    PAP Data:    Time frame: 2019-2020   Compliance 100 %  Average use on days used: 10 hrs 58 min  Percent of days with usage greater than or equal to 4 hours: 100%  PAP range : 20/9cm H2O, rate of 14  Average 90% pressure: 20/9 cmH2O, rate of 14  Leak: 45 L/minute  Average AHI 4.0 events/hr  Mask type: Nasal mask  DME: [facility]    Bed time: 2200  Sleep latency: 30-60 minutes  Number of times awakens during the night: 2  Wake time: 0730  Estimated total sleep time at night: 8-9 hours  Caffeine intake: 2 cups of coffee, 0-1 cups of tea, and 0 sodas per day  Alcohol intake: 0 drinks per week  Nap time: occasional - wears machine   Sleepiness with Driving: none     [place] - 14    2) Patient denies RLS symptoms.     PMHx, FH, SH reviewed and pertinent changes are: Medication changes. Spinal cord injury of unknown cause.      REVIEW OF SYSTEMS:   Negative for chest pain, SOA, fever, chills, cough, N/V/D, abdominal pain.    Smoking:none      Exam:  Vitals:    20 08   BP: 138/81   Pulse: 71   SpO2: 98%           20   Weight: 90.3 kg (199 lb)     Body mass index is 29.37 kg/m². Patient's Body mass index is 29.37 kg/m². BMI is above normal parameters. Recommendations include: referral to primary care.      Gen:                No distress,  conversant, pleasant, appears stated age, alert, oriented  Eyes:               Anicteric sclera, moist conjunctiva, no lid lag                           PERRL, EOMI   Heent:             NC/AT                          Oropharynx clear                          Normal hearing  Lungs:             Normal effort, non-labored breathing                          Clear to auscultation bilaterally          CV:                  Normal S1/S2, no murmur                          No lower extremity edema  ABD:               Soft, rounded, non-distended                          Normal bowel sounds                    Psych:             Appropriate affect  Neuro:             CN 2-12 appear intact      Past Medical History:   Diagnosis Date   • Allergic contact dermatitis    • Allergic rhinitis    • Anesthesia     uncontrolled bp and paralysis   • Anesthesia     2014 increased bp after surgery and paralysis (C3 surgery)   • Anxiety    • Anxiety    • Arthritis    • Asthma    • Asthma    • Benign lipomatous neoplasm      Small lipoma clinically right chest wall      • Chest wall tenderness     right side of sternum   • Epilepsy (CMS/HCC)     myoclonic movement disorder   • Food allergy    • [place] de la Tourette's syndrome     neurology had been seeing him in [place]      • Headache    • Hypertension    • Hypertension    • Hypertension    • Insomnia    • Internal hemorrhoids    • Kyphosis    • Low back pain    • Malaise and fatigue    • Rhonda (CMS/HCC)    • Multiple joint pain    • Myoclonic disorder    • Need for influenza vaccination    • Neuralgia and neuritis    • OCD (obsessive compulsive disorder)    • Polyp of intestine     +FH, leiomyoma      • Psoriasis     psoriasis vs eczema vs fungal infections   • Radiculitis    • [place] spotted fever    • Seborrheic dermatitis    • Skin sensation disturbance    • Spasm of back muscles    • Spinal stenosis of lumbar region    • Tinnitus    • Urinary frequency    • Wears glasses         Current Outpatient Medications:   •  acetaminophen-codeine (TYLENOL #3) 300-30 MG per tablet, , Disp: , Rfl:   •  albuterol (PROAIR RESPICLICK) 108 (90 BASE) MCG/ACT inhaler, Inhale 2 puffs every 4 (four) hours as needed for shortness of air., Disp: , Rfl:   •  amLODIPine (NORVASC) 10 MG tablet, Take 10 mg by mouth Daily., Disp: , Rfl:   •  aspirin 81 MG EC tablet, Take 81 mg by mouth Daily., Disp: , Rfl:   •  Azilsartan-Chlorthalidone (EDARBYCLOR) 40-25 MG tablet, Edarbyclor 40 mg-25 mg tablet  TAKE ONE TABLET BY MOUTH DAILY, Disp: , Rfl:   •  baclofen (LIORESAL) 20 MG tablet, TAKE ONE TABLET BY MOUTH THREE TIMES A DAY, Disp: 90 tablet, Rfl: 0  •  betamethasone dipropionate (DIPROLENE) 0.05 % lotion, betamethasone dipropionate 0.05 % lotion  APPLY TO AFFECTED AREAS OF SCALP BY TOPICAL ROUTE 1-2 TIMES PER DAY ; RUB IN GENTLY AND COMPLETELY. DO NOT RINSE OUT., Disp: , Rfl:   •  cetirizine (zyrTEC) 10 MG tablet, Take 10 mg by mouth Daily., Disp: , Rfl:   •  clonazePAM (KlonoPIN) 0.5 MG tablet, Take 0.5 mg by mouth 3 (Three) Times a Day., Disp: , Rfl:   •  cyanocobalamin 1000 MCG/ML injection, , Disp: , Rfl:   •  gabapentin (NEURONTIN) 600 MG tablet, Take 1 tablet by mouth 3 (Three) Times a Day., Disp: 90 tablet, Rfl: 2  •  montelukast (SINGULAIR) 10 MG tablet, Take 10 mg by mouth Every Night., Disp: , Rfl:   •  Omega-3 Fatty Acids (FISH OIL) 1000 MG capsule capsule, Take 1,000 mg by mouth Daily With Breakfast., Disp: , Rfl:   •  pravastatin (PRAVACHOL) 20 MG tablet, Take 2 tablets by mouth Daily., Disp: 90 tablet, Rfl: 3  •  prednisoLONE acetate (PRED FORTE) 1 % ophthalmic suspension, INSTILL ONE DROP IN LEFT EYE AS DIRECTED, Disp: , Rfl: 1  •  Probiotic Product (PROBIOTIC DAILY PO), Take 1 tablet by mouth Daily., Disp: , Rfl:   •  promethazine (PHENERGAN) 25 MG tablet, TAKE 1 TABLET BY MOUTH EVERY 6 (SIX) HOURS AS NEEDED FOR NAUSEA OR VOMITING., Disp: 30 tablet, Rfl: 1  •  tamsulosin (FLOMAX) 0.4 MG capsule 24  hr capsule, Take 1 capsule by mouth Every Night., Disp: 30 capsule, Rfl: 2  •  chlorothiazide (DIURIL) 500 MG tablet, Take 500 mg by mouth Every Morning., Disp: , Rfl:   •  cyclobenzaprine (FLEXERIL) 5 MG tablet, Take 5 mg by mouth 2 (Two) Times a Day As Needed for Muscle Spasms. 1-2 tablets, Disp: , Rfl:       Assessment and Plan:    1. Complex sleep apnea Established, stable (1)  1. Compliant with PAP therapy  2. Continue PAP as prescribed  3. Script for PAP supplies  4. Return to clinic in 1 year with compliance report unless change in symptoms in interim period  2. Myoclonus - Established, stable        15 of 25 minutes spent face-to-face counseling patient extensively regarding:   PAP therapy, PAP compliance and PAP maintenance      RTC in 12 months. Patient agrees to return sooner if changes in symptoms.        This document has been electronically signed by MILLICENT Thompson on February 26, 2020 8:13 AM          CC: Ashley Watts MD          No ref. provider found

## 2020-07-23 ENCOUNTER — TRANSCRIBE ORDERS (OUTPATIENT)
Dept: LAB | Facility: HOSPITAL | Age: 55
End: 2020-07-23

## 2020-07-23 DIAGNOSIS — E87.6 LOW BLOOD POTASSIUM: Primary | ICD-10-CM

## 2020-07-23 DIAGNOSIS — D50.9 MICROCYTIC ANEMIA: ICD-10-CM

## 2020-07-23 DIAGNOSIS — D64.9 ANEMIA, UNSPECIFIED TYPE: ICD-10-CM

## 2020-07-23 DIAGNOSIS — R79.89 LOW SERUM SODIUM: ICD-10-CM

## 2020-11-20 ENCOUNTER — LAB (OUTPATIENT)
Dept: LAB | Facility: HOSPITAL | Age: 55
End: 2020-11-20

## 2020-11-20 LAB
ANION GAP SERPL CALCULATED.3IONS-SCNC: 9.5 MMOL/L (ref 5–15)
BUN SERPL-MCNC: 15 MG/DL (ref 6–20)
BUN/CREAT SERPL: 12.9 (ref 7–25)
CALCIUM SPEC-SCNC: 9.8 MG/DL (ref 8.6–10.5)
CHLORIDE SERPL-SCNC: 105 MMOL/L (ref 98–107)
CO2 SERPL-SCNC: 27.5 MMOL/L (ref 22–29)
CREAT SERPL-MCNC: 1.16 MG/DL (ref 0.76–1.27)
GFR SERPL CREATININE-BSD FRML MDRD: 65 ML/MIN/1.73
GLUCOSE SERPL-MCNC: 177 MG/DL (ref 65–99)
POTASSIUM SERPL-SCNC: 3.7 MMOL/L (ref 3.5–5.2)
SODIUM SERPL-SCNC: 142 MMOL/L (ref 136–145)

## 2020-11-20 PROCEDURE — 86340 INTRINSIC FACTOR ANTIBODY: CPT | Performed by: FAMILY MEDICINE

## 2020-11-20 PROCEDURE — 36415 COLL VENOUS BLD VENIPUNCTURE: CPT | Performed by: FAMILY MEDICINE

## 2020-11-20 PROCEDURE — 80048 BASIC METABOLIC PNL TOTAL CA: CPT | Performed by: FAMILY MEDICINE

## 2020-11-22 LAB — IF BLOCK AB SER-ACNC: 1.1 AU/ML (ref 0–1.1)

## 2020-11-24 ENCOUNTER — DOCUMENTATION (OUTPATIENT)
Dept: CARDIAC REHAB | Facility: HOSPITAL | Age: 55
End: 2020-11-24

## 2020-11-25 ENCOUNTER — TRANSCRIBE ORDERS (OUTPATIENT)
Dept: CARDIOLOGY | Facility: CLINIC | Age: 55
End: 2020-11-25

## 2020-11-25 DIAGNOSIS — Z95.1 S/P CABG (CORONARY ARTERY BYPASS GRAFT): Primary | ICD-10-CM

## 2020-12-02 ENCOUNTER — TELEPHONE (OUTPATIENT)
Dept: CARDIAC REHAB | Facility: HOSPITAL | Age: 55
End: 2020-12-02

## 2021-02-08 ENCOUNTER — TRANSCRIBE ORDERS (OUTPATIENT)
Dept: LAB | Facility: HOSPITAL | Age: 56
End: 2021-02-08

## 2021-02-08 DIAGNOSIS — I10 ESSENTIAL (PRIMARY) HYPERTENSION: Primary | ICD-10-CM

## 2021-02-08 DIAGNOSIS — R07.9 CHEST PAIN, UNSPECIFIED TYPE: ICD-10-CM

## 2021-02-08 DIAGNOSIS — G25.3 MYOCLONUS: ICD-10-CM

## 2021-02-09 ENCOUNTER — LAB (OUTPATIENT)
Dept: LAB | Facility: HOSPITAL | Age: 56
End: 2021-02-09

## 2021-02-09 LAB
ALBUMIN SERPL-MCNC: 4.2 G/DL (ref 3.5–5.2)
ALBUMIN/GLOB SERPL: 1.7 G/DL
ALP SERPL-CCNC: 73 U/L (ref 39–117)
ALT SERPL W P-5'-P-CCNC: 13 U/L (ref 1–41)
ANION GAP SERPL CALCULATED.3IONS-SCNC: 11.3 MMOL/L (ref 5–15)
AST SERPL-CCNC: 15 U/L (ref 1–40)
BASOPHILS # BLD AUTO: 0.07 10*3/MM3 (ref 0–0.2)
BASOPHILS NFR BLD AUTO: 0.6 % (ref 0–1.5)
BILIRUB SERPL-MCNC: 1.6 MG/DL (ref 0–1.2)
BUN SERPL-MCNC: 20 MG/DL (ref 6–20)
BUN/CREAT SERPL: 14.8 (ref 7–25)
CALCIUM SPEC-SCNC: 10 MG/DL (ref 8.6–10.5)
CHLORIDE SERPL-SCNC: 101 MMOL/L (ref 98–107)
CO2 SERPL-SCNC: 28.7 MMOL/L (ref 22–29)
CREAT SERPL-MCNC: 1.35 MG/DL (ref 0.76–1.27)
DEPRECATED RDW RBC AUTO: 47.3 FL (ref 37–54)
EOSINOPHIL # BLD AUTO: 0.14 10*3/MM3 (ref 0–0.4)
EOSINOPHIL NFR BLD AUTO: 1.2 % (ref 0.3–6.2)
ERYTHROCYTE [DISTWIDTH] IN BLOOD BY AUTOMATED COUNT: 14.9 % (ref 12.3–15.4)
GFR SERPL CREATININE-BSD FRML MDRD: 54 ML/MIN/1.73
GLOBULIN UR ELPH-MCNC: 2.5 GM/DL
GLUCOSE SERPL-MCNC: 110 MG/DL (ref 65–99)
HCT VFR BLD AUTO: 40.2 % (ref 37.5–51)
HGB BLD-MCNC: 13.8 G/DL (ref 13–17.7)
IMM GRANULOCYTES # BLD AUTO: 0.06 10*3/MM3 (ref 0–0.05)
IMM GRANULOCYTES NFR BLD AUTO: 0.5 % (ref 0–0.5)
LYMPHOCYTES # BLD AUTO: 1.3 10*3/MM3 (ref 0.7–3.1)
LYMPHOCYTES NFR BLD AUTO: 10.7 % (ref 19.6–45.3)
MCH RBC QN AUTO: 30.1 PG (ref 26.6–33)
MCHC RBC AUTO-ENTMCNC: 34.3 G/DL (ref 31.5–35.7)
MCV RBC AUTO: 87.8 FL (ref 79–97)
MONOCYTES # BLD AUTO: 1.52 10*3/MM3 (ref 0.1–0.9)
MONOCYTES NFR BLD AUTO: 12.6 % (ref 5–12)
NEUTROPHILS NFR BLD AUTO: 74.4 % (ref 42.7–76)
NEUTROPHILS NFR BLD AUTO: 9.02 10*3/MM3 (ref 1.7–7)
NRBC BLD AUTO-RTO: 0 /100 WBC (ref 0–0.2)
NT-PROBNP SERPL-MCNC: 558.3 PG/ML (ref 0–900)
PLATELET # BLD AUTO: 249 10*3/MM3 (ref 140–450)
PMV BLD AUTO: 11.3 FL (ref 6–12)
POTASSIUM SERPL-SCNC: 3.8 MMOL/L (ref 3.5–5.2)
PROT SERPL-MCNC: 6.7 G/DL (ref 6–8.5)
RBC # BLD AUTO: 4.58 10*6/MM3 (ref 4.14–5.8)
SODIUM SERPL-SCNC: 141 MMOL/L (ref 136–145)
WBC # BLD AUTO: 12.11 10*3/MM3 (ref 3.4–10.8)

## 2021-02-09 PROCEDURE — 80053 COMPREHEN METABOLIC PANEL: CPT | Performed by: FAMILY MEDICINE

## 2021-02-09 PROCEDURE — 85025 COMPLETE CBC W/AUTO DIFF WBC: CPT | Performed by: FAMILY MEDICINE

## 2021-02-09 PROCEDURE — 83880 ASSAY OF NATRIURETIC PEPTIDE: CPT | Performed by: FAMILY MEDICINE

## 2021-02-09 PROCEDURE — 36415 COLL VENOUS BLD VENIPUNCTURE: CPT | Performed by: FAMILY MEDICINE

## 2021-02-10 ENCOUNTER — LAB (OUTPATIENT)
Dept: LAB | Facility: HOSPITAL | Age: 56
End: 2021-02-10

## 2021-02-10 LAB
ANION GAP SERPL CALCULATED.3IONS-SCNC: 11.6 MMOL/L (ref 5–15)
BASOPHILS # BLD AUTO: 0.05 10*3/MM3 (ref 0–0.2)
BASOPHILS NFR BLD AUTO: 0.5 % (ref 0–1.5)
BUN SERPL-MCNC: 22 MG/DL (ref 6–20)
BUN/CREAT SERPL: 16.4 (ref 7–25)
CALCIUM SPEC-SCNC: 10 MG/DL (ref 8.6–10.5)
CERULOPLASMIN SERPL-MCNC: 32 MG/DL (ref 16–31)
CHLORIDE SERPL-SCNC: 102 MMOL/L (ref 98–107)
CO2 SERPL-SCNC: 25.4 MMOL/L (ref 22–29)
CREAT SERPL-MCNC: 1.34 MG/DL (ref 0.76–1.27)
DEPRECATED RDW RBC AUTO: 46.8 FL (ref 37–54)
EOSINOPHIL # BLD AUTO: 0.15 10*3/MM3 (ref 0–0.4)
EOSINOPHIL NFR BLD AUTO: 1.4 % (ref 0.3–6.2)
ERYTHROCYTE [DISTWIDTH] IN BLOOD BY AUTOMATED COUNT: 14.6 % (ref 12.3–15.4)
GFR SERPL CREATININE-BSD FRML MDRD: 55 ML/MIN/1.73
GLUCOSE SERPL-MCNC: 158 MG/DL (ref 65–99)
HCT VFR BLD AUTO: 37.9 % (ref 37.5–51)
HGB BLD-MCNC: 12.7 G/DL (ref 13–17.7)
IMM GRANULOCYTES # BLD AUTO: 0.06 10*3/MM3 (ref 0–0.05)
IMM GRANULOCYTES NFR BLD AUTO: 0.6 % (ref 0–0.5)
LYMPHOCYTES # BLD AUTO: 1.2 10*3/MM3 (ref 0.7–3.1)
LYMPHOCYTES NFR BLD AUTO: 11 % (ref 19.6–45.3)
MCH RBC QN AUTO: 29.3 PG (ref 26.6–33)
MCHC RBC AUTO-ENTMCNC: 33.5 G/DL (ref 31.5–35.7)
MCV RBC AUTO: 87.3 FL (ref 79–97)
MONOCYTES # BLD AUTO: 0.74 10*3/MM3 (ref 0.1–0.9)
MONOCYTES NFR BLD AUTO: 6.8 % (ref 5–12)
NEUTROPHILS NFR BLD AUTO: 79.7 % (ref 42.7–76)
NEUTROPHILS NFR BLD AUTO: 8.69 10*3/MM3 (ref 1.7–7)
NRBC BLD AUTO-RTO: 0 /100 WBC (ref 0–0.2)
NT-PROBNP SERPL-MCNC: 537.7 PG/ML (ref 0–900)
PLATELET # BLD AUTO: 307 10*3/MM3 (ref 140–450)
PMV BLD AUTO: 11.6 FL (ref 6–12)
POTASSIUM SERPL-SCNC: 3.9 MMOL/L (ref 3.5–5.2)
RBC # BLD AUTO: 4.34 10*6/MM3 (ref 4.14–5.8)
SODIUM SERPL-SCNC: 139 MMOL/L (ref 136–145)
WBC # BLD AUTO: 10.89 10*3/MM3 (ref 3.4–10.8)

## 2021-02-10 PROCEDURE — 83880 ASSAY OF NATRIURETIC PEPTIDE: CPT | Performed by: PSYCHIATRY & NEUROLOGY

## 2021-02-10 PROCEDURE — 80048 BASIC METABOLIC PNL TOTAL CA: CPT | Performed by: PSYCHIATRY & NEUROLOGY

## 2021-02-10 PROCEDURE — 86341 ISLET CELL ANTIBODY: CPT | Performed by: PSYCHIATRY & NEUROLOGY

## 2021-02-10 PROCEDURE — 82390 ASSAY OF CERULOPLASMIN: CPT | Performed by: PSYCHIATRY & NEUROLOGY

## 2021-02-10 PROCEDURE — 85025 COMPLETE CBC W/AUTO DIFF WBC: CPT | Performed by: FAMILY MEDICINE

## 2021-02-10 PROCEDURE — 36415 COLL VENOUS BLD VENIPUNCTURE: CPT | Performed by: FAMILY MEDICINE

## 2021-02-13 LAB — GAD65 AB SER IA-ACNC: <5 U/ML (ref 0–5)

## 2021-03-03 ENCOUNTER — OFFICE VISIT (OUTPATIENT)
Dept: SLEEP MEDICINE | Facility: HOSPITAL | Age: 56
End: 2021-03-03

## 2021-03-03 DIAGNOSIS — G47.31 COMPLEX SLEEP APNEA SYNDROME: Primary | ICD-10-CM

## 2021-03-03 PROCEDURE — 99442 PR PHYS/QHP TELEPHONE EVALUATION 11-20 MIN: CPT | Performed by: NURSE PRACTITIONER

## 2021-03-03 RX ORDER — PRIMIDONE 50 MG/1
100 TABLET ORAL NIGHTLY
COMMUNITY

## 2021-03-03 RX ORDER — BUSPIRONE HYDROCHLORIDE 5 MG/1
5 TABLET ORAL 3 TIMES DAILY
COMMUNITY

## 2021-03-03 RX ORDER — METOPROLOL SUCCINATE 100 MG/1
100 TABLET, EXTENDED RELEASE ORAL DAILY
COMMUNITY

## 2021-03-03 RX ORDER — FOLIC ACID 1 MG/1
1 TABLET ORAL DAILY
COMMUNITY

## 2021-03-03 RX ORDER — EPINEPHRINE 0.3 MG/.3ML
0.3 INJECTION SUBCUTANEOUS AS NEEDED
COMMUNITY

## 2021-03-03 RX ORDER — FUROSEMIDE 40 MG/1
40 TABLET ORAL 2 TIMES DAILY
COMMUNITY

## 2021-03-03 RX ORDER — ATORVASTATIN CALCIUM 40 MG/1
40 TABLET, FILM COATED ORAL DAILY
COMMUNITY

## 2021-03-03 RX ORDER — CLOPIDOGREL BISULFATE 75 MG/1
75 TABLET ORAL DAILY
COMMUNITY

## 2021-03-03 RX ORDER — FAMOTIDINE 20 MG/1
20 TABLET, FILM COATED ORAL 2 TIMES DAILY
COMMUNITY

## 2021-03-03 RX ORDER — CLOTRIMAZOLE AND BETAMETHASONE DIPROPIONATE 10; .5 MG/ML; MG/ML
LOTION TOPICAL 2 TIMES DAILY
COMMUNITY

## 2021-03-03 RX ORDER — CLOBETASOL PROPIONATE 0.5 MG/ML
LOTION TOPICAL WEEKLY
COMMUNITY

## 2021-03-03 RX ORDER — CYCLOPENTOLATE HYDROCHLORIDE 10 MG/ML
1 SOLUTION/ DROPS OPHTHALMIC DAILY PRN
COMMUNITY

## 2021-03-03 RX ORDER — HYDROCORTISONE 25 MG/ML
LOTION TOPICAL 2 TIMES DAILY
COMMUNITY

## 2021-03-03 RX ORDER — AZELASTINE 1 MG/ML
1 SPRAY, METERED NASAL 2 TIMES DAILY
COMMUNITY

## 2021-03-03 RX ORDER — FERROUS SULFATE TAB EC 324 MG (65 MG FE EQUIVALENT) 324 (65 FE) MG
324 TABLET DELAYED RESPONSE ORAL
COMMUNITY

## 2021-03-03 RX ORDER — FLUTICASONE PROPIONATE 50 MCG
2 SPRAY, SUSPENSION (ML) NASAL DAILY
COMMUNITY

## 2021-03-03 RX ORDER — MOMETASONE FUROATE 50 UG/1
2 SPRAY, METERED NASAL DAILY
COMMUNITY

## 2021-03-03 RX ORDER — KETOCONAZOLE 20 MG/G
CREAM TOPICAL DAILY
COMMUNITY

## 2021-03-03 NOTE — PROGRESS NOTES
Sleep Clinic Follow Up - Telephone Visit      You have chosen to receive care through a telephone visit. Do you consent to use a telephone visit for your medical care today? Yes    Date: 3/3/2021  Primary Care Provider: Ashley Watts MD    Last office visit: 2020 (I reviewed this note)    CC: Follow up: Complex sleep apnea on BiPAP S/T      Interim History:  Since the last visit:    1) moderate complex sleep apnea -  Evangelist Gipson has reportedly remained compliant with BiPAP. He denies mask and machine issues, dry mouth, headaches, or pressures intolerance. He denies abnormal dreams, sleep paralysis, nasal congestion, URI sx.    2) Patient denies RLS symptoms.     Sleep Testin. PSG on 10/12/2016, AHI of 15.5    2. Currently on BiPAP S/T 20/9 cm H2O, rate of 14    PAP Data:    No new data available  Mask type: Nasal mask  DME: [facility]    Bed time: 5820-7752  Sleep latency: 30-60 minutes  Number of times awakens during the night: 2  Wake time: 9927-4885  Estimated total sleep time at night: 7-9 hours  Caffeine intake: 2 cups of coffee, 0-1 cups of tea, and 0 sodas per day  Alcohol intake: 0 drinks per week  Nap time: occasional   Sleepiness with Driving: none       PMHx, FH, SH reviewed and pertinent changes are: Had quadruple bypass surgery in September ([facility]) - hospitalized for 2 weeks. Had pericarditis and pleural effusion in January - hospitalized. [facility] 3/[facility]/21 ([facility] - some improvement but still having LV dysfunction.      REVIEW OF SYSTEMS:   Negative for chest pain, SOA, fever, chills, cough, N/V/D, abdominal pain.    Smoking:none    Evangelist Gipson  reports that he quit smoking about 25 years ago. He has never used smokeless tobacco.    Exam:  Unable to perform physical exam due to conducting telephone visit    Patient's There is no height or weight on file to calculate BMI.        Past Medical History:   Diagnosis Date   • Allergic contact  dermatitis    • Allergic rhinitis    • Anesthesia     uncontrolled bp and paralysis   • Anesthesia     2014 increased bp after surgery and paralysis (C3 surgery)   • Anxiety    • Anxiety    • Arthritis    • Asthma    • Asthma    • Benign lipomatous neoplasm      Small lipoma clinically right chest wall      • Chest wall tenderness     right side of sternum   • Epilepsy (CMS/HCC)     myoclonic movement disorder   • Food allergy    • [place] Tourette's syndrome     neurology had been seeing him in [place]      • Headache    • Hypertension    • Hypertension    • Hypertension    • Insomnia    • Internal hemorrhoids    • Kyphosis    • Low back pain    • Malaise and fatigue    • Rhonda (CMS/HCC)    • Multiple joint pain    • Myoclonic disorder    • Need for influenza vaccination    • Neuralgia and neuritis    • OCD (obsessive compulsive disorder)    • Polyp of intestine     +FH, leiomyoma      • Psoriasis     psoriasis vs eczema vs fungal infections   • Radiculitis    • [place] spotted fever    • Seborrheic dermatitis    • Skin sensation disturbance    • Spasm of back muscles    • Spinal stenosis of lumbar region    • Tinnitus    • Urinary frequency    • Wears glasses        Current Outpatient Medications:   •  acetaminophen-codeine (TYLENOL #3) 300-30 MG per tablet, , Disp: , Rfl:   •  albuterol (PROAIR RESPICLICK) 108 (90 BASE) MCG/ACT inhaler, Inhale 2 puffs every 4 (four) hours as needed for shortness of air., Disp: , Rfl:   •  aspirin 81 MG EC tablet, Take 81 mg by mouth Daily., Disp: , Rfl:   •  atorvastatin (LIPITOR) 40 MG tablet, Take 40 mg by mouth Daily., Disp: , Rfl:   •  azelastine (ASTELIN) 0.1 % nasal spray, 1 spray into the nostril(s) as directed by provider 2 (Two) Times a Day. Use in each nostril as directed, Disp: , Rfl:   •  baclofen (LIORESAL) 20 MG tablet, TAKE ONE TABLET BY MOUTH THREE TIMES A DAY, Disp: 90 tablet, Rfl: 0  •  betamethasone dipropionate (DIPROLENE) 0.05 % lotion,  betamethasone dipropionate 0.05 % lotion  APPLY TO AFFECTED AREAS OF SCALP BY TOPICAL ROUTE 1-2 TIMES PER DAY ; RUB IN GENTLY AND COMPLETELY. DO NOT RINSE OUT., Disp: , Rfl:   •  busPIRone (BUSPAR) 5 MG tablet, Take 5 mg by mouth 3 (Three) Times a Day., Disp: , Rfl:   •  cetirizine (zyrTEC) 10 MG tablet, Take 10 mg by mouth Daily., Disp: , Rfl:   •  clobetasol (CLOBEX) 0.05 % lotion, Apply  topically to the appropriate area as directed 1 (One) Time Per Week., Disp: , Rfl:   •  clonazePAM (KlonoPIN) 0.5 MG tablet, Take 1 mg by mouth 3 (Three) Times a Day As Needed., Disp: , Rfl:   •  clopidogrel (PLAVIX) 75 MG tablet, Take 75 mg by mouth Daily., Disp: , Rfl:   •  clotrimazole-betamethasone (LOTRISONE) 1-0.05 % lotion, Apply  topically to the appropriate area as directed 2 (Two) Times a Day., Disp: , Rfl:   •  cyanocobalamin 1000 MCG/ML injection, , Disp: , Rfl:   •  cyclopentolate (CYCLOGYL) 1 % ophthalmic solution, Administer 1 drop to both eyes Daily As Needed., Disp: , Rfl:   •  diclofenac (VOLTAREN) 50 MG EC tablet, Take 50 mg by mouth 2 (Two) Times a Day., Disp: , Rfl:   •  EPINEPHrine (EPIPEN) 0.3 MG/0.3ML solution auto-injector injection, Inject 0.3 mg into the appropriate muscle as directed by prescriber As Needed., Disp: , Rfl:   •  famotidine (PEPCID) 20 MG tablet, Take 20 mg by mouth 2 (Two) Times a Day., Disp: , Rfl:   •  ferrous sulfate 324 (65 Fe) MG tablet delayed-release EC tablet, Take 324 mg by mouth Daily With Breakfast., Disp: , Rfl:   •  fluticasone (FLONASE) 50 MCG/ACT nasal spray, 2 sprays into the nostril(s) as directed by provider Daily., Disp: , Rfl:   •  folic acid (FOLVITE) 1 MG tablet, Take 1 mg by mouth Daily., Disp: , Rfl:   •  furosemide (LASIX) 40 MG tablet, Take 40 mg by mouth 2 (Two) Times a Day., Disp: , Rfl:   •  gabapentin (NEURONTIN) 600 MG tablet, Take 1 tablet by mouth 3 (Three) Times a Day., Disp: 90 tablet, Rfl: 2  •  hydrocortisone 2.5 % lotion, Apply  topically to the  appropriate area as directed 2 (Two) Times a Day., Disp: , Rfl:   •  ketoconazole (NIZORAL) 2 % cream, Apply  topically to the appropriate area as directed Daily., Disp: , Rfl:   •  metoprolol succinate XL (TOPROL-XL) 100 MG 24 hr tablet, Take 100 mg by mouth Daily., Disp: , Rfl:   •  mometasone (NASONEX) 50 MCG/ACT nasal spray, 2 sprays into the nostril(s) as directed by provider Daily., Disp: , Rfl:   •  potassium chloride ER (K-TAB) 20 MEQ tablet controlled-release ER tablet, Take 20 mEq by mouth 2 (Two) Times a Day., Disp: , Rfl:   •  prednisoLONE acetate (PRED FORTE) 1 % ophthalmic suspension, INSTILL ONE DROP IN LEFT EYE AS DIRECTED, Disp: , Rfl: 1  •  primidone (MYSOLINE) 50 MG tablet, Take 100 mg by mouth Every Night., Disp: , Rfl:   •  promethazine (PHENERGAN) 25 MG tablet, TAKE 1 TABLET BY MOUTH EVERY 6 (SIX) HOURS AS NEEDED FOR NAUSEA OR VOMITING., Disp: 30 tablet, Rfl: 1  •  tamsulosin (FLOMAX) 0.4 MG capsule 24 hr capsule, Take 1 capsule by mouth Every Night., Disp: 30 capsule, Rfl: 2     WBC   Date Value Ref Range Status   02/10/2021 10.89 (H) 3.40 - 10.80 10*3/mm3 Final   02/05/2021 7.1 4.1 - 10.9 THOUS/uL Final     RBC   Date Value Ref Range Status   02/10/2021 4.34 4.14 - 5.80 10*6/mm3 Final   02/05/2021 4.75 3.35 - 5.50 MIL/uL Final     Hemoglobin   Date Value Ref Range Status   02/10/2021 12.7 (L) 13.0 - 17.7 g/dL Final   02/05/2021 14.4 12.9 - 16.6 GM/DL Final     Hematocrit   Date Value Ref Range Status   02/10/2021 37.9 37.5 - 51.0 % Final   02/05/2021 44.5 38.0 - 48.0 % Final     MCV   Date Value Ref Range Status   02/10/2021 87.3 79.0 - 97.0 fL Final   02/05/2021 93.7 81.0 - 95.0 FL Final     MCH   Date Value Ref Range Status   02/10/2021 29.3 26.6 - 33.0 pg Final   02/05/2021 30.3 27.0 - 33.0 PG Final     MCHC   Date Value Ref Range Status   02/10/2021 33.5 31.5 - 35.7 g/dL Final   02/05/2021 32.4 (L) 33.0 - 37.0 G/DL Final     RDW   Date Value Ref Range Status   02/10/2021 14.6 12.3 - 15.4  % Final   02/05/2021 15.2 (H) 11.5 - 14.5 % Final     RDW-SD   Date Value Ref Range Status   02/10/2021 46.8 37.0 - 54.0 fl Final   02/05/2021 52.1 (H) 35.0 - 42.0 FL Final     MPV   Date Value Ref Range Status   02/10/2021 11.6 6.0 - 12.0 fL Final   02/05/2021 10.9 (H) 7.4 - 10.4 FL Final     Platelets   Date Value Ref Range Status   02/10/2021 307 140 - 450 10*3/mm3 Final   02/05/2021 316 130 - 400 THOUS/uL Final     Neutrophil Rel %   Date Value Ref Range Status   02/05/2021 66.4 42.2 - 75.2 % Final     Neutrophil %   Date Value Ref Range Status   02/10/2021 79.7 (H) 42.7 - 76.0 % Final     Lymphocyte Rel %   Date Value Ref Range Status   02/05/2021 19.6 (L) 20.5 - 51.1 % Final     Lymphocyte %   Date Value Ref Range Status   02/10/2021 11.0 (L) 19.6 - 45.3 % Final     Monocyte Rel %   Date Value Ref Range Status   02/05/2021 10.8 (H) 1.7 - 9.3 % Final     Monocyte %   Date Value Ref Range Status   02/10/2021 6.8 5.0 - 12.0 % Final     Eosinophil %   Date Value Ref Range Status   02/10/2021 1.4 0.3 - 6.2 % Final   02/05/2021 2.0 1.0 - 3.0 % Final     Basophil Rel %   Date Value Ref Range Status   02/05/2021 0.8 0.0 - 2.0 % Final     Basophil %   Date Value Ref Range Status   02/10/2021 0.5 0.0 - 1.5 % Final     Immature Grans %   Date Value Ref Range Status   02/10/2021 0.6 (H) 0.0 - 0.5 % Final   02/05/2021 0.4 0.0 - 0.4 % Final     Comment:     IG PARAMETER REFLECTS THE  COMBINATION OF METAMYELOCYTES,  MYELOCYTES, AND PROMYELOCYTES.     Neutrophils Absolute   Date Value Ref Range Status   02/05/2021 4.7 1.7 - 8.7 THOUS/uL Final     Neutrophils, Absolute   Date Value Ref Range Status   02/10/2021 8.69 (H) 1.70 - 7.00 10*3/mm3 Final     Lymphocytes Absolute   Date Value Ref Range Status   02/05/2021 1.4 0.8 - 5.6 THOUS/uL Final     Lymphocytes, Absolute   Date Value Ref Range Status   02/10/2021 1.20 0.70 - 3.10 10*3/mm3 Final     Monocytes Absolute   Date Value Ref Range Status   02/05/2021 0.8 0.1 - 1.0  THOUS/uL Final     Monocytes, Absolute   Date Value Ref Range Status   02/10/2021 0.74 0.10 - 0.90 10*3/mm3 Final     Eosinophils Absolute   Date Value Ref Range Status   02/05/2021 0.1 0.0 - 0.3 THOUS/uL Final     Eosinophils, Absolute   Date Value Ref Range Status   02/10/2021 0.15 0.00 - 0.40 10*3/mm3 Final     Basophils Absolute   Date Value Ref Range Status   02/05/2021 0.1 0.0 - 0.2 THOUS/uL Final     Basophils, Absolute   Date Value Ref Range Status   02/10/2021 0.05 0.00 - 0.20 10*3/mm3 Final     Immature Grans, Absolute   Date Value Ref Range Status   02/10/2021 0.06 (H) 0.00 - 0.05 10*3/mm3 Final   02/05/2021 0.03 0.00 - 0.04 THOUS/uL Final     nRBC   Date Value Ref Range Status   02/10/2021 0.0 0.0 - 0.2 /100 WBC Final       Lab Results   Component Value Date    GLUCOSE 158 (H) 02/10/2021    BUN 22 (H) 02/10/2021    CREATININE 1.34 (H) 02/10/2021    EGFRIFNONA 55 (L) 02/10/2021    BCR 16.4 02/10/2021    K 3.9 02/10/2021    CO2 25.4 02/10/2021    CALCIUM 10.0 02/10/2021    ALBUMIN 4.20 02/09/2021    LABIL2 1.9 10/25/2019    AST 15 02/09/2021    ALT 13 02/09/2021         Assessment and Plan:    1. Complex sleep apnea - Established, stable (1)  1. Reportedly compliant with PAP therapy - obtain compliance report   2. Continue PAP as prescribed  3. Script for PAP supplies  4. Return to clinic in 1 year with compliance report unless change in symptoms in interim period      This visit has been rescheduled as a phone visit to comply with patient safety concerns in accordance with CDC recommendations. Total time of discussion was 15 minutes.    I spent 30 minutes caring for Evangelist on this date of service. This time includes time spent by me in the following activities: preparing for the visit, obtaining and/or reviewing a separately obtained history, counseling and educating the patient/family/caregiver, documenting information in the medical record and care coordination; discussing PAP therapy, PAP compliance,  PAP maintenance and Medication changes      RTC in 12 months. Patient agrees to return sooner if changes in symptoms.      This document has been electronically signed by MILLICENT Thompson on March 3, 2021 16:35 CST          CC: Ashley Watts MD          No ref. provider found

## 2021-03-08 ENCOUNTER — DOCUMENTATION (OUTPATIENT)
Dept: SLEEP MEDICINE | Facility: HOSPITAL | Age: 56
End: 2021-03-08

## 2021-03-08 NOTE — PROGRESS NOTES
PAP Data:    Time frame: 03/10/2020-03/04/2021   Compliance 95 %  Average use on days used: 8 hrs 52 min  Percent of days with usage greater than or equal to 4 hours: 93.9%  PAP range : 20/9 cm H2O  Average 90% pressure: 20/9 cmH2O  Leak: 0 minutes  Average AHI 2.7 events/hr      Appropriate usage and AHI. Continue with yearly follow up as scheduled 03/03/2022.

## 2021-08-24 DIAGNOSIS — G47.31 COMPLEX SLEEP APNEA SYNDROME: Primary | ICD-10-CM

## 2021-10-14 NOTE — PATIENT INSTRUCTIONS

## 2022-03-03 ENCOUNTER — OFFICE VISIT (OUTPATIENT)
Dept: SLEEP MEDICINE | Facility: HOSPITAL | Age: 57
End: 2022-03-03

## 2022-03-03 VITALS
DIASTOLIC BLOOD PRESSURE: 91 MMHG | WEIGHT: 191 LBS | SYSTOLIC BLOOD PRESSURE: 144 MMHG | OXYGEN SATURATION: 97 % | HEIGHT: 69 IN | HEART RATE: 63 BPM | BODY MASS INDEX: 28.29 KG/M2

## 2022-03-03 DIAGNOSIS — G47.31 COMPLEX SLEEP APNEA SYNDROME: Primary | ICD-10-CM

## 2022-03-03 PROCEDURE — 99214 OFFICE O/P EST MOD 30 MIN: CPT | Performed by: NURSE PRACTITIONER

## 2022-03-03 RX ORDER — POTASSIUM CHLORIDE 20 MEQ/1
TABLET, EXTENDED RELEASE ORAL
COMMUNITY
Start: 2022-03-01

## 2022-03-03 RX ORDER — DOCUSATE SODIUM 100 MG/1
TABLET, FILM COATED ORAL
COMMUNITY
Start: 2022-03-01

## 2022-03-03 RX ORDER — OMEPRAZOLE 20 MG/1
20 CAPSULE, DELAYED RELEASE ORAL
COMMUNITY
Start: 2022-02-14

## 2022-03-03 RX ORDER — NITROFURANTOIN 25; 75 MG/1; MG/1
CAPSULE ORAL
COMMUNITY
End: 2022-03-03

## 2022-03-03 RX ORDER — COLCHICINE 0.6 MG/1
TABLET ORAL
COMMUNITY
End: 2022-03-03

## 2022-03-03 RX ORDER — CLONAZEPAM 1 MG/1
TABLET ORAL
COMMUNITY
Start: 2022-02-03

## 2022-03-03 NOTE — PROGRESS NOTES
Sleep Clinic Follow Up    Date: 3/3/2022  Primary Care Provider: Ashley Watts MD    Last office visit: 2021 (telephone visit) (I reviewed this note)    CC: Follow up: Complex sleep apnea on BiPAP S/T, yearly follow up      Interim History:  Since the last visit:    1) moderate complex sleep apnea -  Evangelist Gipson has remained compliant with BiPAP. He denies mask and machine issues, dry mouth, headaches, or pressures intolerance. He does not report abnormal dreams, sleep paralysis, nasal congestion, URI sx. He still has not received his replacement BiPAP from [facility]. We discussed options for obtaining a new machine, and patient wishes to switch suppliers to [facility] at this time due to machine availability.     Sleep Testin. Split night PSG on 10/12/2016, AHI of 15.5   2. PAP titration on same night recommended BiPAP 20/9 cm H2O  3. Currently on BiPAP S/T 20/9 cm H2O, rate of 14    PAP Data:    Time frame: 2021-2022   Compliance: 100%  Average use on days used: 10 hrs 50 min  Percent of days with usage greater than or equal to 4 hours: 99.7%  PAP range: 20/9 cm H2O  Average 90% pressure: 20/9 cmH2O  Leak: ~24 L/minute  Average AHI: 1.5 events/hr  Mask type: Nasal mask  DME: [facility], switching to [facility] due to machine availability     Bed time: 1883-0842  Sleep latency: varies   Number of times awakens during the night: 0  Wake time: 8078-6550  Estimated total sleep time at night: 9-10 hours  Caffeine intake: 2 cups of coffee, 0 cups of tea, and 0 sodas per day  Alcohol intake: 0 drinks per week  Sleepiness with Driving: none     [place] - 3    PMHx, FH, SH reviewed and pertinent changes are: Started Primidone.    REVIEW OF SYSTEMS:   Negative for chest pain, SOA, fever, chills, cough, N/V/D, abdominal pain.    Smoking:none    Evangelist Gipson  reports that he quit smoking about 26 years ago. He has never used smokeless tobacco.      Exam:  Vitals:     03/03/22 1333   BP: 144/91   Pulse: 63   SpO2: 97%           03/03/22  1333   Weight: 86.6 kg (191 lb)     Body mass index is 28.19 kg/m². Patient's Body mass index is 28.19 kg/m². indicating that he is overweight (BMI 25-29.9). Patient's (Body mass index is 28.19 kg/m².) indicates that they are overweight with health conditions that include obstructive sleep apnea, hypertension, dyslipidemias and osteoarthritis . Weight is unchanged. BMI is is above average; BMI management plan is completed. I recommend portion control and increasing exercise.       Gen:                No distress, conversant, pleasant, appears stated age, alert, oriented  Eyes:               Anicteric sclera, moist conjunctiva, no lid lag                           PERRL, EOMI   Heent:             NC/AT                          Normal hearing  Lungs:             Normal effort, non-labored breathing        CV:                  Normal S1/S2                          No lower extremity edema  ABD:               Rounded, non-distended                    Psych:             Appropriate affect  Neuro:             CN 2-12 appear intact    Past Medical History:   Diagnosis Date   • Allergic contact dermatitis    • Allergic rhinitis    • Anesthesia     uncontrolled bp and paralysis   • Anesthesia     2014 increased bp after surgery and paralysis (C3 surgery)   • Anxiety    • Anxiety    • Arthritis    • Asthma    • Asthma    • Benign lipomatous neoplasm      Small lipoma clinically right chest wall      • Chest wall tenderness     right side of sternum   • Epilepsy (HCC)     myoclonic movement disorder   • Food allergy    • [place] Tourette's syndrome     neurology had been seeing him in [place]      • Headache    • Hypertension    • Hypertension    • Hypertension    • Insomnia    • Internal hemorrhoids    • Kyphosis    • Low back pain    • Malaise and fatigue    • Rhonda (HCC)    • Multiple joint pain    • Myoclonic disorder    • Need for influenza  vaccination    • Neuralgia and neuritis    • OCD (obsessive compulsive disorder)    • Polyp of intestine     +FH, leiomyoma      • Psoriasis     psoriasis vs eczema vs fungal infections   • Radiculitis    • [place] spotted fever    • Seborrheic dermatitis    • Skin sensation disturbance    • Spasm of back muscles    • Spinal stenosis of lumbar region    • Tinnitus    • Urinary frequency    • Wears glasses        Current Outpatient Medications:   •  acetaminophen-codeine (TYLENOL #3) 300-30 MG per tablet, , Disp: , Rfl:   •  albuterol (PROAIR RESPICLICK) 108 (90 BASE) MCG/ACT inhaler, Inhale 2 puffs every 4 (four) hours as needed for shortness of air., Disp: , Rfl:   •  aspirin 81 MG EC tablet, Take 81 mg by mouth Daily., Disp: , Rfl:   •  atorvastatin (LIPITOR) 40 MG tablet, Take 40 mg by mouth Daily., Disp: , Rfl:   •  azelastine (ASTELIN) 0.1 % nasal spray, 1 spray into the nostril(s) as directed by provider 2 (Two) Times a Day. Use in each nostril as directed, Disp: , Rfl:   •  baclofen (LIORESAL) 20 MG tablet, TAKE ONE TABLET BY MOUTH THREE TIMES A DAY, Disp: 90 tablet, Rfl: 0  •  betamethasone dipropionate (DIPROLENE) 0.05 % lotion, betamethasone dipropionate 0.05 % lotion  APPLY TO AFFECTED AREAS OF SCALP BY TOPICAL ROUTE 1-2 TIMES PER DAY ; RUB IN GENTLY AND COMPLETELY. DO NOT RINSE OUT., Disp: , Rfl:   •  busPIRone (BUSPAR) 5 MG tablet, Take 5 mg by mouth 3 (Three) Times a Day., Disp: , Rfl:   •  cetirizine (zyrTEC) 10 MG tablet, Take 10 mg by mouth Daily., Disp: , Rfl:   •  clobetasol (CLOBEX) 0.05 % lotion, Apply  topically to the appropriate area as directed 1 (One) Time Per Week., Disp: , Rfl:   •  clonazePAM (KlonoPIN) 1 MG tablet, TAKE 1 TABLET (1 MG) BY MOUTH 3 TIMES DAILY AS NEEDED, Disp: , Rfl:   •  clopidogrel (PLAVIX) 75 MG tablet, Take 75 mg by mouth Daily., Disp: , Rfl:   •  clotrimazole-betamethasone (LOTRISONE) 1-0.05 % lotion, Apply  topically to the appropriate area as directed 2  (Two) Times a Day., Disp: , Rfl:   •  cyanocobalamin 1000 MCG/ML injection, , Disp: , Rfl:   •  cyclopentolate (CYCLOGYL) 1 % ophthalmic solution, Administer 1 drop to both eyes Daily As Needed., Disp: , Rfl:   •  diclofenac (VOLTAREN) 50 MG EC tablet, Take 50 mg by mouth 2 (Two) Times a Day., Disp: , Rfl:   •   MG capsule, TAKE 1 TABLET (100 MG) BY MOUTH 2 TIMES DAILY AS NEEDED, Disp: , Rfl:   •  EPINEPHrine (EPIPEN) 0.3 MG/0.3ML solution auto-injector injection, Inject 0.3 mg into the appropriate muscle as directed by prescriber As Needed., Disp: , Rfl:   •  famotidine (PEPCID) 20 MG tablet, Take 20 mg by mouth 2 (Two) Times a Day., Disp: , Rfl:   •  ferrous sulfate 324 (65 Fe) MG tablet delayed-release EC tablet, Take 324 mg by mouth Daily With Breakfast., Disp: , Rfl:   •  fluticasone (FLONASE) 50 MCG/ACT nasal spray, 2 sprays into the nostril(s) as directed by provider Daily., Disp: , Rfl:   •  folic acid (FOLVITE) 1 MG tablet, Take 1 mg by mouth Daily., Disp: , Rfl:   •  furosemide (LASIX) 40 MG tablet, Take 40 mg by mouth 2 (Two) Times a Day., Disp: , Rfl:   •  gabapentin (NEURONTIN) 600 MG tablet, Take 1 tablet by mouth 3 (Three) Times a Day., Disp: 90 tablet, Rfl: 2  •  hydrocortisone 2.5 % lotion, Apply  topically to the appropriate area as directed 2 (Two) Times a Day., Disp: , Rfl:   •  ketoconazole (NIZORAL) 2 % cream, Apply  topically to the appropriate area as directed Daily., Disp: , Rfl:   •  metoprolol succinate XL (TOPROL-XL) 100 MG 24 hr tablet, Take 100 mg by mouth Daily., Disp: , Rfl:   •  mometasone (NASONEX) 50 MCG/ACT nasal spray, 2 sprays into the nostril(s) as directed by provider Daily., Disp: , Rfl:   •  omeprazole (priLOSEC) 20 MG capsule, Take 20 mg by mouth Before Breakfast., Disp: , Rfl:   •  potassium chloride (K-DUR,KLOR-CON) 20 MEQ CR tablet, TAKE 1 TABLET (20 MEQ) BY MOUTH 2 TIMES DAILY, Disp: , Rfl:   •  potassium chloride ER (K-TAB) 20 MEQ tablet controlled-release ER  tablet, Take 20 mEq by mouth 2 (Two) Times a Day., Disp: , Rfl:   •  prednisoLONE acetate (PRED FORTE) 1 % ophthalmic suspension, INSTILL ONE DROP IN LEFT EYE AS DIRECTED, Disp: , Rfl: 1  •  primidone (MYSOLINE) 50 MG tablet, Take 100 mg by mouth Every Night., Disp: , Rfl:   •  promethazine (PHENERGAN) 25 MG tablet, TAKE 1 TABLET BY MOUTH EVERY 6 (SIX) HOURS AS NEEDED FOR NAUSEA OR VOMITING., Disp: 30 tablet, Rfl: 1  •  tamsulosin (FLOMAX) 0.4 MG capsule 24 hr capsule, Take 1 capsule by mouth Every Night., Disp: 30 capsule, Rfl: 2  •  methotrexate 2.5 MG tablet, methotrexate sodium 2.5 mg tablet, Disp: , Rfl:     WBC   Date Value Ref Range Status   10/27/2021 4.6 4.1 - 10.9 THOUS/uL Final     RBC   Date Value Ref Range Status   10/27/2021 5.07 3.35 - 5.50 MIL/uL Final     Hemoglobin   Date Value Ref Range Status   10/27/2021 15.3 12.9 - 16.6 GM/DL Final     Hematocrit   Date Value Ref Range Status   10/27/2021 48.0 38.0 - 48.0 % Final     MCV   Date Value Ref Range Status   10/27/2021 94.7 81.0 - 95.0 FL Final     MCH   Date Value Ref Range Status   10/27/2021 30.2 27.0 - 33.0 PG Final     MCHC   Date Value Ref Range Status   10/27/2021 31.9 (L) 33.0 - 37.0 G/DL Final     RDW   Date Value Ref Range Status   10/27/2021 13.5 11.5 - 14.5 % Final     RDW-SD   Date Value Ref Range Status   10/27/2021 47.7 (H) 35.0 - 42.0 FL Final     MPV   Date Value Ref Range Status   10/27/2021 10.8 (H) 7.4 - 10.4 FL Final     Platelets   Date Value Ref Range Status   10/27/2021 173 130 - 400 THOUS/uL Final     Neutrophil Rel %   Date Value Ref Range Status   10/27/2021 45.6 42.2 - 75.2 % Final     Lymphocyte Rel %   Date Value Ref Range Status   10/27/2021 34.2 20.5 - 51.1 % Final     Monocyte Rel %   Date Value Ref Range Status   10/27/2021 13.7 (H) 1.7 - 9.3 % Final     Eosinophil %   Date Value Ref Range Status   10/27/2021 5.0 (H) 1.0 - 3.0 % Final     Basophil Rel %   Date Value Ref Range Status   10/27/2021 1.3 0.0 - 2.0 % Final      Immature Grans %   Date Value Ref Range Status   10/27/2021 0.2 0.0 - 0.4 % Final     Comment:     IG PARAMETER REFLECTS THE  COMBINATION OF METAMYELOCYTES,  MYELOCYTES, AND PROMYELOCYTES.     Neutrophils Absolute   Date Value Ref Range Status   10/27/2021 2.1 1.7 - 8.7 THOUS/uL Final     Lymphocytes Absolute   Date Value Ref Range Status   10/27/2021 1.6 0.8 - 5.6 THOUS/uL Final     Monocytes Absolute   Date Value Ref Range Status   10/27/2021 0.6 0.1 - 1.0 THOUS/uL Final     Eosinophils Absolute   Date Value Ref Range Status   10/27/2021 0.2 0.0 - 0.3 THOUS/uL Final     Basophils Absolute   Date Value Ref Range Status   10/27/2021 0.1 0.0 - 0.2 THOUS/uL Final     Immature Grans, Absolute   Date Value Ref Range Status   10/27/2021 0.01 0.00 - 0.04 THOUS/uL Final     nRBC   Date Value Ref Range Status   10/27/2021 0.0 0 /100 WBC'S Final   02/10/2021 0.0 0.0 - 0.2 /100 WBC Final       Lab Results   Component Value Date    GLUCOSE 158 (H) 02/10/2021    BUN 22 (H) 02/10/2021    CREATININE 1.34 (H) 02/10/2021    EGFRIFNONA 55 (L) 02/10/2021    BCR 16.4 02/10/2021    K 3.9 02/10/2021    CO2 25.4 02/10/2021    CALCIUM 10.0 02/10/2021    ALBUMIN 4.20 02/09/2021    LABIL2 1.9 10/25/2019    AST 15 02/09/2021    ALT 13 02/09/2021       Assessment and Plan:    1. Complex sleep apnea - Established, stable (1)  1. Compliant with PAP therapy  2. Continue PAP as prescribed - patient is aware of the safety recall and registered his machine months ago. He has been using an in line filter and is very uncomfortable with the dryness caused by not being able to use humidification  3. Script for PAP supplies and new BiPAP S/T 20/9 cm H2O, rate 14 ([facility])  4. Return to clinic in 30-90 days with compliance report unless change in symptoms in interim period      I spent 35 minutes caring for Evangelist on this date of service. This time includes time spent by me in the following activities: preparing for the visit, reviewing tests,  obtaining and/or reviewing a separately obtained history, performing a medically appropriate examination and/or evaluation , counseling and educating the patient/family/caregiver, documenting information in the medical record and care coordination; discussing PAP therapy, PAP compliance and PAP maintenance    Patient to follow up in 31-90 days with compliance report. Patient agrees to return sooner if changes in symptoms.        This document has been electronically signed by MILLICENT Paulino on March 3, 2022 13:36 CST          CC: Ashley Watts MD          No ref. provider found

## 2022-04-04 ENCOUNTER — LAB (OUTPATIENT)
Dept: LAB | Facility: HOSPITAL | Age: 57
End: 2022-04-04

## 2022-04-04 ENCOUNTER — TRANSCRIBE ORDERS (OUTPATIENT)
Dept: LAB | Facility: HOSPITAL | Age: 57
End: 2022-04-04

## 2022-04-04 DIAGNOSIS — R10.9 LEFT SIDED ABDOMINAL PAIN: ICD-10-CM

## 2022-04-04 DIAGNOSIS — R63.4 WEIGHT LOSS: ICD-10-CM

## 2022-04-04 DIAGNOSIS — R10.9 RIGHT SIDED ABDOMINAL PAIN: ICD-10-CM

## 2022-04-04 DIAGNOSIS — R63.4 WEIGHT LOSS: Primary | ICD-10-CM

## 2022-04-04 DIAGNOSIS — R13.10 DYSPHAGIA, UNSPECIFIED TYPE: ICD-10-CM

## 2022-04-04 PROCEDURE — 83993 ASSAY FOR CALPROTECTIN FECAL: CPT

## 2022-04-04 PROCEDURE — 89125 SPECIMEN FAT STAIN: CPT

## 2022-04-04 PROCEDURE — 82653 EL-1 FECAL QUANTITATIVE: CPT

## 2022-04-05 LAB
FATTY ACIDS: NORMAL
NEUTRAL FATS: NORMAL

## 2022-04-06 ENCOUNTER — TRANSCRIBE ORDERS (OUTPATIENT)
Dept: LAB | Facility: HOSPITAL | Age: 57
End: 2022-04-06

## 2022-04-06 DIAGNOSIS — R63.4 WEIGHT LOSS: ICD-10-CM

## 2022-04-06 DIAGNOSIS — R13.10 DYSPHAGIA, UNSPECIFIED TYPE: ICD-10-CM

## 2022-04-06 DIAGNOSIS — R10.9 LEFT SIDED ABDOMINAL PAIN: Primary | ICD-10-CM

## 2022-04-06 DIAGNOSIS — R10.9 RIGHT SIDED ABDOMINAL PAIN: ICD-10-CM

## 2022-04-09 LAB — CALPROTECTIN STL-MCNT: <16 UG/G (ref 0–120)

## 2022-04-10 LAB — ELASTASE PANC STL-MCNT: 50 UG ELAST./G

## 2022-04-11 ENCOUNTER — LAB (OUTPATIENT)
Dept: LAB | Facility: HOSPITAL | Age: 57
End: 2022-04-11

## 2022-04-11 DIAGNOSIS — R10.9 LEFT SIDED ABDOMINAL PAIN: ICD-10-CM

## 2022-04-11 DIAGNOSIS — R63.4 WEIGHT LOSS: ICD-10-CM

## 2022-04-11 DIAGNOSIS — R13.10 DYSPHAGIA, UNSPECIFIED TYPE: ICD-10-CM

## 2022-04-11 DIAGNOSIS — R10.9 RIGHT SIDED ABDOMINAL PAIN: ICD-10-CM

## 2022-04-11 PROCEDURE — 86671 FUNGUS NES ANTIBODY: CPT

## 2022-04-11 PROCEDURE — 83516 IMMUNOASSAY NONANTIBODY: CPT

## 2022-04-11 PROCEDURE — 36415 COLL VENOUS BLD VENIPUNCTURE: CPT

## 2022-04-14 LAB
BAKER'S YEAST IGG QN IA: 33 UNITS (ref 0–50)
CHITOBIOSIDE IGA SERPL IA-ACNC: 12 UNITS (ref 0–90)
LAMINARIBIOSIDE IGG SERPL IA-ACNC: 33 UNITS (ref 0–60)
MANNOBIOSIDE IGG SERPL IA-ACNC: 17 UNITS (ref 0–100)

## 2022-04-29 ENCOUNTER — TRANSCRIBE ORDERS (OUTPATIENT)
Dept: LAB | Facility: HOSPITAL | Age: 57
End: 2022-04-29

## 2022-04-29 DIAGNOSIS — K86.89 PANCREATIC INSUFFICIENCY: Primary | ICD-10-CM

## 2022-04-29 DIAGNOSIS — K86.1 CHRONIC PANCREATITIS, UNSPECIFIED PANCREATITIS TYPE: Primary | ICD-10-CM

## 2022-05-02 ENCOUNTER — LAB (OUTPATIENT)
Dept: LAB | Facility: HOSPITAL | Age: 57
End: 2022-05-02

## 2022-05-02 ENCOUNTER — TRANSCRIBE ORDERS (OUTPATIENT)
Dept: LAB | Facility: HOSPITAL | Age: 57
End: 2022-05-02

## 2022-05-02 DIAGNOSIS — I25.10 ATHEROSCLEROSIS OF NATIVE CORONARY ARTERY, UNSPECIFIED WHETHER ANGINA PRESENT, UNSPECIFIED WHETHER NATIVE OR TRANSPLANTED HEART: Primary | ICD-10-CM

## 2022-05-02 DIAGNOSIS — I25.10 ATHEROSCLEROSIS OF NATIVE CORONARY ARTERY, UNSPECIFIED WHETHER ANGINA PRESENT, UNSPECIFIED WHETHER NATIVE OR TRANSPLANTED HEART: ICD-10-CM

## 2022-05-02 DIAGNOSIS — K86.1 CHRONIC PANCREATITIS, UNSPECIFIED PANCREATITIS TYPE: ICD-10-CM

## 2022-05-02 DIAGNOSIS — K86.89 PANCREATIC INSUFFICIENCY: ICD-10-CM

## 2022-05-02 PROCEDURE — 36415 COLL VENOUS BLD VENIPUNCTURE: CPT

## 2022-05-02 PROCEDURE — 80048 BASIC METABOLIC PNL TOTAL CA: CPT

## 2022-05-02 PROCEDURE — 84597 ASSAY OF VITAMIN K: CPT

## 2022-05-02 PROCEDURE — 84590 ASSAY OF VITAMIN A: CPT

## 2022-05-02 PROCEDURE — 84630 ASSAY OF ZINC: CPT

## 2022-05-02 PROCEDURE — 82306 VITAMIN D 25 HYDROXY: CPT

## 2022-05-02 PROCEDURE — 82787 IGG 1 2 3 OR 4 EACH: CPT

## 2022-05-02 PROCEDURE — 82784 ASSAY IGA/IGD/IGG/IGM EACH: CPT

## 2022-05-02 PROCEDURE — 84255 ASSAY OF SELENIUM: CPT

## 2022-05-03 LAB
25(OH)D3 SERPL-MCNC: 21.9 NG/ML (ref 30–100)
ANION GAP SERPL CALCULATED.3IONS-SCNC: 13.8 MMOL/L (ref 5–15)
BUN SERPL-MCNC: 14 MG/DL (ref 6–20)
BUN/CREAT SERPL: 14.4 (ref 7–25)
CALCIUM SPEC-SCNC: 9.4 MG/DL (ref 8.6–10.5)
CHLORIDE SERPL-SCNC: 106 MMOL/L (ref 98–107)
CO2 SERPL-SCNC: 24.2 MMOL/L (ref 22–29)
CREAT SERPL-MCNC: 0.97 MG/DL (ref 0.76–1.27)
EGFRCR SERPLBLD CKD-EPI 2021: 90.5 ML/MIN/1.73
GLUCOSE SERPL-MCNC: 75 MG/DL (ref 65–99)
IGG SERPL-MCNC: 543 MG/DL (ref 603–1613)
IGG1 SER-MCNC: 315 MG/DL (ref 248–810)
IGG2 SER-MCNC: 127 MG/DL (ref 130–555)
IGG3 SER-MCNC: 19 MG/DL (ref 15–102)
IGG4 SER-MCNC: 27 MG/DL (ref 2–96)
POTASSIUM SERPL-SCNC: 3.9 MMOL/L (ref 3.5–5.2)
SODIUM SERPL-SCNC: 144 MMOL/L (ref 136–145)

## 2022-05-04 LAB — SELENIUM SERPL-MCNC: 116 UG/L (ref 93–198)

## 2022-05-05 LAB — ZINC SERPL-MCNC: 93 UG/DL (ref 44–115)

## 2022-05-11 LAB — VIT A SERPL-MCNC: 61.1 UG/DL (ref 20.1–62)

## 2022-05-16 LAB — PHYTONADIONE SERPL-MCNC: 0.65 NG/ML (ref 0.1–2.2)

## 2022-05-31 ENCOUNTER — OFFICE VISIT (OUTPATIENT)
Dept: SLEEP MEDICINE | Facility: HOSPITAL | Age: 57
End: 2022-05-31

## 2022-05-31 VITALS
WEIGHT: 188 LBS | DIASTOLIC BLOOD PRESSURE: 76 MMHG | OXYGEN SATURATION: 96 % | HEART RATE: 54 BPM | BODY MASS INDEX: 27.85 KG/M2 | HEIGHT: 69 IN | SYSTOLIC BLOOD PRESSURE: 122 MMHG

## 2022-05-31 DIAGNOSIS — G47.31 COMPLEX SLEEP APNEA SYNDROME: Primary | ICD-10-CM

## 2022-05-31 PROCEDURE — 99213 OFFICE O/P EST LOW 20 MIN: CPT | Performed by: NURSE PRACTITIONER

## 2022-05-31 RX ORDER — MAGNESIUM 64 MG (MAGNESIUM CHLORIDE) TABLET,DELAYED RELEASE
128 DAILY
COMMUNITY
Start: 2022-05-23 | End: 2022-11-20

## 2022-05-31 RX ORDER — MOMETASONE FUROATE 50 UG/1
2 SPRAY, METERED NASAL 2 TIMES DAILY
COMMUNITY
Start: 2022-03-07 | End: 2023-03-08

## 2022-05-31 RX ORDER — PRAVASTATIN SODIUM 40 MG
TABLET ORAL
COMMUNITY
End: 2022-05-31 | Stop reason: SDUPTHER

## 2022-05-31 RX ORDER — DULOXETIN HYDROCHLORIDE 30 MG/1
30 CAPSULE, DELAYED RELEASE ORAL DAILY
COMMUNITY
Start: 2022-05-05

## 2022-05-31 RX ORDER — PREDNISONE 20 MG/1
TABLET ORAL
COMMUNITY
Start: 2022-05-12

## 2022-05-31 RX ORDER — KETOCONAZOLE 20 MG/ML
SHAMPOO TOPICAL
COMMUNITY
Start: 2022-04-04

## 2022-05-31 RX ORDER — POLYETHYLENE GLYCOL 3350 17 G/17G
17 POWDER, FOR SOLUTION ORAL DAILY
COMMUNITY

## 2022-05-31 RX ORDER — ERGOCALCIFEROL 1.25 MG/1
50000 CAPSULE ORAL
COMMUNITY
Start: 2022-05-23 | End: 2022-08-22

## 2022-05-31 NOTE — PROGRESS NOTES
Sleep Clinic Follow Up    Date: 2022  Primary Care Provider: Ashley Watts MD    Last office visit: 2022 (I reviewed this note)    CC: Follow up: Complex sleep apnea on BiPAP S/T      Interim History:  Since the last visit:    1) moderate complex sleep apnea -  Evangelist Gipson has remained compliant with BiPAP. He denies mask and machine issues, dry mouth, headaches, or pressures intolerance. He denies abnormal dreams, sleep paralysis, nasal congestion, URI sx. Patient's wife reports that the patient's machine is making a beeping noise throughout the night. They report that they have talked with their DME company regarding this issue but nothing has been resolved. It sounds like it could possibly be a leak alarm.      Sleep Testin. Split night PSG on 10/12/2016, AHI of 15.5   2. CPAP titration on same day, recommended 20/9 cm H2O   3. Currently on 20/9 cm H2O, rate of 14    PAP Data:    Time frame: 2022-2022   Compliance: 98%  Average use on days used: 11 hrs 18 min  Percent of days with usage greater than or equal to 4 hours: 97%  PAP range: 20/9 cm H2O  Average 90% pressure: 20/9 cmH2O  Leak: 22.5 L/minute  Average AHI: 1.6 events/hr  Mask type: Nasal mask  DME: [facility]    Bed time routine unchanged.    [place] - 7    PMHx, FH, SH reviewed and pertinent changes are: Reportedly unchanged from last office visit with us.      REVIEW OF SYSTEMS:   Negative for chest pain, SOA, fever, chills, cough, N/V/D, abdominal pain.    Smoking:none    Evangelist Gipson  reports that he quit smoking about 26 years ago. He has never used smokeless tobacco.      Exam:  Vitals:    22 1309   BP: 122/76   Pulse: 54   SpO2: 96%           22  1309   Weight: 85.3 kg (188 lb)     Body mass index is 27.75 kg/m². BMI is >= 25 and < 30. (Overweight) The following options were offered after discussion: referral to primary care    Gen:                No distress, conversant,  pleasant, appears stated age, alert, oriented  Eyes:               Anicteric sclera, moist conjunctiva, no lid lag                           PERRL, EOMI   Heent:             Sores on bilateral cheeks due to reaction to mask material    NC/AT                          Oropharynx clear                          Normal hearing  Lungs:             Normal effort, non-labored breathing     CV:                  Normal S1/S2                          No lower extremity edema  ABD:               Rounded, non-distended              Psych:             Appropriate affect  Neuro:             CN 2-12 appear intact    Past Medical History:   Diagnosis Date   • Allergic contact dermatitis    • Allergic rhinitis    • Anesthesia     uncontrolled bp and paralysis   • Anesthesia     2014 increased bp after surgery and paralysis (C3 surgery)   • Anxiety    • Anxiety    • Arthritis    • Asthma    • Asthma    • Benign lipomatous neoplasm      Small lipoma clinically right chest wall      • Chest wall tenderness     right side of sternum   • Epilepsy (HCC)     myoclonic movement disorder   • Food allergy    • [place] Tourette's syndrome     neurology had been seeing him in [place]      • Headache    • Hypertension    • Hypertension    • Hypertension    • Insomnia    • Internal hemorrhoids    • Kyphosis    • Low back pain    • Malaise and fatigue    • Rhonda (HCC)    • Multiple joint pain    • Myoclonic disorder    • Need for influenza vaccination    • Neuralgia and neuritis    • OCD (obsessive compulsive disorder)    • Polyp of intestine     +FH, leiomyoma      • Psoriasis     psoriasis vs eczema vs fungal infections   • Radiculitis    • [place] spotted fever    • Seborrheic dermatitis    • Skin sensation disturbance    • Spasm of back muscles    • Spinal stenosis of lumbar region    • Tinnitus    • Urinary frequency    • Wears glasses        Current Outpatient Medications:   •  acetaminophen-codeine (TYLENOL #3) 300-30 MG per  tablet, , Disp: , Rfl:   •  albuterol (PROAIR RESPICLICK) 108 (90 BASE) MCG/ACT inhaler, Inhale 2 puffs every 4 (four) hours as needed for shortness of air., Disp: , Rfl:   •  aspirin 81 MG EC tablet, Take 81 mg by mouth Daily., Disp: , Rfl:   •  atorvastatin (LIPITOR) 40 MG tablet, Take 40 mg by mouth Daily., Disp: , Rfl:   •  azelastine (ASTELIN) 0.1 % nasal spray, 1 spray into the nostril(s) as directed by provider 2 (Two) Times a Day. Use in each nostril as directed, Disp: , Rfl:   •  baclofen (LIORESAL) 20 MG tablet, TAKE ONE TABLET BY MOUTH THREE TIMES A DAY, Disp: 90 tablet, Rfl: 0  •  betamethasone dipropionate (DIPROLENE) 0.05 % lotion, betamethasone dipropionate 0.05 % lotion  APPLY TO AFFECTED AREAS OF SCALP BY TOPICAL ROUTE 1-2 TIMES PER DAY ; RUB IN GENTLY AND COMPLETELY. DO NOT RINSE OUT., Disp: , Rfl:   •  busPIRone (BUSPAR) 5 MG tablet, Take 5 mg by mouth 3 (Three) Times a Day., Disp: , Rfl:   •  cetirizine (zyrTEC) 10 MG tablet, Take 10 mg by mouth Daily., Disp: , Rfl:   •  clobetasol (CLOBEX) 0.05 % lotion, Apply  topically to the appropriate area as directed 1 (One) Time Per Week., Disp: , Rfl:   •  clonazePAM (KlonoPIN) 1 MG tablet, TAKE 1 TABLET (1 MG) BY MOUTH 3 TIMES DAILY AS NEEDED, Disp: , Rfl:   •  clopidogrel (PLAVIX) 75 MG tablet, Take 75 mg by mouth Daily., Disp: , Rfl:   •  clotrimazole-betamethasone (LOTRISONE) 1-0.05 % lotion, Apply  topically to the appropriate area as directed 2 (Two) Times a Day., Disp: , Rfl:   •  cyanocobalamin 1000 MCG/ML injection, , Disp: , Rfl:   •  cyclopentolate (CYCLOGYL) 1 % ophthalmic solution, Administer 1 drop to both eyes Daily As Needed., Disp: , Rfl:   •  diclofenac (VOLTAREN) 50 MG EC tablet, Take 50 mg by mouth 2 (Two) Times a Day., Disp: , Rfl:   •   MG capsule, TAKE 1 TABLET (100 MG) BY MOUTH 2 TIMES DAILY AS NEEDED, Disp: , Rfl:   •  DULoxetine (CYMBALTA) 30 MG capsule, Take 30 mg by mouth Daily., Disp: , Rfl:   •  EPINEPHrine (EPIPEN)  0.3 MG/0.3ML solution auto-injector injection, Inject 0.3 mg into the appropriate muscle as directed by prescriber As Needed., Disp: , Rfl:   •  ergocalciferol (ERGOCALCIFEROL) 1.25 MG (10608 UT) capsule, Take 50,000 Units by mouth., Disp: , Rfl:   •  famotidine (PEPCID) 20 MG tablet, Take 20 mg by mouth 2 (Two) Times a Day., Disp: , Rfl:   •  ferrous sulfate 324 (65 Fe) MG tablet delayed-release EC tablet, Take 324 mg by mouth Daily With Breakfast., Disp: , Rfl:   •  fluticasone (FLONASE) 50 MCG/ACT nasal spray, 2 sprays into the nostril(s) as directed by provider Daily., Disp: , Rfl:   •  folic acid (FOLVITE) 1 MG tablet, Take 1 mg by mouth Daily., Disp: , Rfl:   •  furosemide (LASIX) 40 MG tablet, Take 40 mg by mouth 2 (Two) Times a Day., Disp: , Rfl:   •  gabapentin (NEURONTIN) 600 MG tablet, Take 1 tablet by mouth 3 (Three) Times a Day., Disp: 90 tablet, Rfl: 2  •  hydrocortisone 2.5 % lotion, Apply  topically to the appropriate area as directed 2 (Two) Times a Day., Disp: , Rfl:   •  ketoconazole (NIZORAL) 2 % cream, Apply  topically to the appropriate area as directed Daily., Disp: , Rfl:   •  ketoconazole (NIZORAL) 2 % shampoo, , Disp: , Rfl:   •  magnesium chloride ER (Mag64) 64 MG DR tablet, Take 128 mg by mouth Daily., Disp: , Rfl:   •  methotrexate 2.5 MG tablet, methotrexate sodium 2.5 mg tablet, Disp: , Rfl:   •  metoprolol succinate XL (TOPROL-XL) 100 MG 24 hr tablet, Take 100 mg by mouth Daily., Disp: , Rfl:   •  mometasone (NASONEX) 50 MCG/ACT nasal spray, 2 sprays into the nostril(s) as directed by provider Daily., Disp: , Rfl:   •  mometasone (NASONEX) 50 MCG/ACT nasal spray, 2 sprays by Each Nare route 2 (Two) Times a Day., Disp: , Rfl:   •  omeprazole (priLOSEC) 20 MG capsule, Take 20 mg by mouth Before Breakfast., Disp: , Rfl:   •  polyethylene glycol (MIRALAX) 17 GM/SCOOP powder, Take 17 g by mouth Daily., Disp: , Rfl:   •  potassium chloride (K-DUR,KLOR-CON) 20 MEQ CR tablet, TAKE 1 TABLET  (20 MEQ) BY MOUTH 2 TIMES DAILY, Disp: , Rfl:   •  potassium chloride ER (K-TAB) 20 MEQ tablet controlled-release ER tablet, Take 20 mEq by mouth 2 (Two) Times a Day., Disp: , Rfl:   •  prednisoLONE acetate (PRED FORTE) 1 % ophthalmic suspension, INSTILL ONE DROP IN LEFT EYE AS DIRECTED, Disp: , Rfl: 1  •  predniSONE (DELTASONE) 20 MG tablet, Take  by mouth., Disp: , Rfl:   •  primidone (MYSOLINE) 50 MG tablet, Take 100 mg by mouth Every Night., Disp: , Rfl:   •  promethazine (PHENERGAN) 25 MG tablet, TAKE 1 TABLET BY MOUTH EVERY 6 (SIX) HOURS AS NEEDED FOR NAUSEA OR VOMITING., Disp: 30 tablet, Rfl: 1  •  tamsulosin (FLOMAX) 0.4 MG capsule 24 hr capsule, Take 1 capsule by mouth Every Night., Disp: 30 capsule, Rfl: 2    WBC   Date Value Ref Range Status   10/27/2021 4.6 4.1 - 10.9 THOUS/uL Final     RBC   Date Value Ref Range Status   10/27/2021 5.07 3.35 - 5.50 MIL/uL Final     Hemoglobin   Date Value Ref Range Status   10/27/2021 15.3 12.9 - 16.6 GM/DL Final     Hematocrit   Date Value Ref Range Status   10/27/2021 48.0 38.0 - 48.0 % Final     MCV   Date Value Ref Range Status   10/27/2021 94.7 81.0 - 95.0 FL Final     MCH   Date Value Ref Range Status   10/27/2021 30.2 27.0 - 33.0 PG Final     MCHC   Date Value Ref Range Status   10/27/2021 31.9 (L) 33.0 - 37.0 G/DL Final     RDW   Date Value Ref Range Status   10/27/2021 13.5 11.5 - 14.5 % Final     RDW-SD   Date Value Ref Range Status   10/27/2021 47.7 (H) 35.0 - 42.0 FL Final     MPV   Date Value Ref Range Status   10/27/2021 10.8 (H) 7.4 - 10.4 FL Final     Platelets   Date Value Ref Range Status   10/27/2021 173 130 - 400 THOUS/uL Final     Neutrophil Rel %   Date Value Ref Range Status   10/27/2021 45.6 42.2 - 75.2 % Final     Lymphocyte Rel %   Date Value Ref Range Status   10/27/2021 34.2 20.5 - 51.1 % Final     Monocyte Rel %   Date Value Ref Range Status   10/27/2021 13.7 (H) 1.7 - 9.3 % Final     Eosinophil %   Date Value Ref Range Status   10/27/2021  5.0 (H) 1.0 - 3.0 % Final     Basophil Rel %   Date Value Ref Range Status   10/27/2021 1.3 0.0 - 2.0 % Final     Immature Grans %   Date Value Ref Range Status   10/27/2021 0.2 0.0 - 0.4 % Final     Comment:     IG PARAMETER REFLECTS THE  COMBINATION OF METAMYELOCYTES,  MYELOCYTES, AND PROMYELOCYTES.     Neutrophils Absolute   Date Value Ref Range Status   10/27/2021 2.1 1.7 - 8.7 THOUS/uL Final     Lymphocytes Absolute   Date Value Ref Range Status   10/27/2021 1.6 0.8 - 5.6 THOUS/uL Final     Monocytes Absolute   Date Value Ref Range Status   10/27/2021 0.6 0.1 - 1.0 THOUS/uL Final     Eosinophils Absolute   Date Value Ref Range Status   10/27/2021 0.2 0.0 - 0.3 THOUS/uL Final     Basophils Absolute   Date Value Ref Range Status   10/27/2021 0.1 0.0 - 0.2 THOUS/uL Final     Immature Grans, Absolute   Date Value Ref Range Status   10/27/2021 0.01 0.00 - 0.04 THOUS/uL Final     nRBC   Date Value Ref Range Status   10/27/2021 0.0 0 /100 WBC'S Final   02/10/2021 0.0 0.0 - 0.2 /100 WBC Final       Lab Results   Component Value Date    GLUCOSE 75 05/02/2022    BUN 14 05/02/2022    CREATININE 0.97 05/02/2022    EGFRIFNONA 55 (L) 02/10/2021    BCR 14.4 05/02/2022    K 3.9 05/02/2022    CO2 24.2 05/02/2022    CALCIUM 9.4 05/02/2022    ALBUMIN 4.20 02/09/2021    LABIL2 1.9 10/25/2019    AST 15 02/09/2021    ALT 13 02/09/2021       Assessment and Plan:    1. Complex sleep apnea - Established, stable (1)  1. Compliant with PAP therapy  2. Continue PAP as prescribed  3. Script for PAP supplies  4. Orders for DME company to service machine - turn off alarm sounds if possible  5. Return to clinic in 1 year with compliance report unless change in symptoms in interim period      I spent 20 minutes caring for Evangelist on this date of service. This time includes time spent by me in the following activities: preparing for the visit, reviewing tests, obtaining and/or reviewing a separately obtained history, performing a medically  appropriate examination and/or evaluation , counseling and educating the patient/family/caregiver, documenting information in the medical record and care coordination; discussing PAP therapy, PAP compliance and PAP maintenance    RTC in 12 months. Patient agrees to return sooner if changes in symptoms.        This document has been electronically signed by MILLICENT Paulino on May 31, 2022 13:14 CDT          CC: Ashley Watts MD          No ref. provider found

## 2023-05-04 ENCOUNTER — TRANSCRIBE ORDERS (OUTPATIENT)
Dept: LAB | Facility: HOSPITAL | Age: 58
End: 2023-05-04
Payer: COMMERCIAL

## 2023-05-04 DIAGNOSIS — N39.0 URINARY TRACT INFECTION WITHOUT HEMATURIA, SITE UNSPECIFIED: Primary | ICD-10-CM

## 2023-05-31 ENCOUNTER — OFFICE VISIT (OUTPATIENT)
Dept: SLEEP MEDICINE | Facility: HOSPITAL | Age: 58
End: 2023-05-31

## 2023-05-31 VITALS
HEIGHT: 69 IN | BODY MASS INDEX: 23.55 KG/M2 | HEART RATE: 107 BPM | WEIGHT: 159 LBS | DIASTOLIC BLOOD PRESSURE: 88 MMHG | SYSTOLIC BLOOD PRESSURE: 149 MMHG | OXYGEN SATURATION: 98 %

## 2023-05-31 DIAGNOSIS — G47.31 COMPLEX SLEEP APNEA SYNDROME: Primary | ICD-10-CM

## 2023-05-31 RX ORDER — MONTELUKAST SODIUM 10 MG/1
1 TABLET ORAL DAILY
COMMUNITY
Start: 2023-05-22

## 2023-05-31 RX ORDER — FLUTICASONE PROPIONATE AND SALMETEROL 50; 250 UG/1; UG/1
POWDER RESPIRATORY (INHALATION)
COMMUNITY
Start: 2023-05-22

## 2023-05-31 RX ORDER — NIACINAMIDE 500 MG
1 TABLET, EXTENDED RELEASE ORAL DAILY
COMMUNITY

## 2023-05-31 RX ORDER — NYSTATIN 100000 U/G
CREAM TOPICAL
COMMUNITY
Start: 2023-04-19

## 2023-05-31 RX ORDER — AMITRIPTYLINE HYDROCHLORIDE 25 MG/1
TABLET, FILM COATED ORAL
COMMUNITY
Start: 2023-05-22

## 2023-05-31 RX ORDER — PROMETHAZINE HYDROCHLORIDE 25 MG/1
1 TABLET ORAL EVERY 6 HOURS PRN
COMMUNITY
Start: 2023-04-24 | End: 2023-10-22

## 2023-05-31 RX ORDER — FAMOTIDINE 40 MG/1
1 TABLET, FILM COATED ORAL DAILY
COMMUNITY
Start: 2023-05-22

## 2023-05-31 RX ORDER — ACETAMINOPHEN 160 MG
TABLET,DISINTEGRATING ORAL
COMMUNITY
Start: 2023-05-22

## 2023-05-31 RX ORDER — AMLODIPINE BESYLATE 5 MG/1
TABLET ORAL
COMMUNITY
Start: 2023-05-30

## 2023-05-31 RX ORDER — LOSARTAN POTASSIUM AND HYDROCHLOROTHIAZIDE 12.5; 5 MG/1; MG/1
TABLET ORAL
COMMUNITY
Start: 2023-05-18

## 2023-05-31 RX ORDER — MOMETASONE FUROATE 50 UG/1
2 SPRAY, METERED NASAL 2 TIMES DAILY
COMMUNITY
Start: 2023-02-06 | End: 2024-02-07

## 2023-05-31 RX ORDER — RABEPRAZOLE SODIUM 20 MG/1
TABLET, DELAYED RELEASE ORAL
COMMUNITY
Start: 2023-05-30

## 2023-05-31 RX ORDER — LEVOCETIRIZINE DIHYDROCHLORIDE 5 MG/1
5 TABLET, FILM COATED ORAL
COMMUNITY
Start: 2023-05-22

## 2023-05-31 RX ORDER — CRISABOROLE 20 MG/G
OINTMENT TOPICAL
COMMUNITY
Start: 2023-04-27

## 2023-05-31 RX ORDER — LANOLIN ALCOHOL/MO/W.PET/CERES
CREAM (GRAM) TOPICAL
COMMUNITY
Start: 2023-05-22

## 2023-05-31 RX ORDER — PRIMIDONE 50 MG/1
150 TABLET ORAL
COMMUNITY
Start: 2023-05-02 | End: 2023-10-30

## 2023-05-31 RX ORDER — CLONAZEPAM 1 MG/1
1 TABLET ORAL
COMMUNITY
Start: 2023-04-12 | End: 2023-07-12

## 2023-05-31 NOTE — PROGRESS NOTES
Sleep Clinic Follow Up    Date: 2023  Primary Care Provider: Ashley Watts MD    Last office visit: 2022 (I reviewed this note)    CC: Follow up: Complex sleep apnea on BiPAP S/T      Interim History:  Since the last visit:    1) moderate complex sleep apnea -  Evangelist Gipson has remained compliant with BiPAP. He denies mask and machine issues, dry mouth, headaches, or pressures intolerance. He denies abnormal dreams, sleep paralysis, nasal congestion, URI sx.    2) Patient repots occasional RLS symptoms.     Sleep Testin. Split night PSG on 10/12/2016, AHI of 15.5   2. PAP titration on same day, recommended 20/9 cm H2O, rate of 14  3. Currently on 20/9 cm H2O, rate of 14    PAP Data:    Time frame: 2023-2023   Compliance: 97%  Average use on days used: 12 hrs 38 min  Percent of days with usage greater than or equal to 4 hours: 97%  PAP range: 20/9 cm H2O, rate of 14  Average 90% pressure: 20/9 cmH2O  Leak: 18.2 L/minute  Average AHI: 4.1 events/hr  Mask type: Nasal mask - wants to try nasal wisp or nasal pillows with hose hook up at top of head  DME: [employer]  Machine type: ResMed AirCurve 10 S/T    Bed time: 1246-7226  Sleep latency: 15-60 minutes  Number of times awakens during the night: 2-3  Wake time: no set time  Estimated total sleep time at night: 12 hours  Caffeine intake: 1 cups of coffee, 0 cups of tea, and 0 sodas per day  Alcohol intake: 0 drinks per week  Nap time: none/rare   Sleepiness with Driving: none     [place] - 9  [place] Sleepiness Scale  Sitting and reading: Slight chance of dozing  Watching TV: Would never doze  Sitting, inactive in a public place (e.g. a theatre or a meeting): High chance of dozing  As a passenger in a car for an hour without a break: Slight chance of dozing  Lying down to rest in the afternoon when circumstances permit: High chance of dozing  Sitting and talking to someone: Would never doze  Sitting quietly after a lunch  without alcohol: Would never doze  In a car, while stopped for a few minutes in traffic: Slight chance of dozing  Total score: 9    PMHx, FH, SH reviewed and pertinent changes are: Colonoscopy.     Review of Systems:   Negative for chest pain, SOA, fever, chills, cough, N/V/D, abdominal pain.    Smoking:none    Evangelist Gipson  reports that he quit smoking about 27 years ago. His smoking use included cigarettes. He has never used smokeless tobacco.     Physical Exam:  Vitals:    05/31/23 1322   BP: 149/88   Pulse: 107   SpO2: 98%           05/31/23  1322   Weight: 72.1 kg (159 lb)     Body mass index is 23.47 kg/m². BMI is within normal parameters. No other follow-up for BMI required.    Gen:                No distress, conversant, pleasant, appears stated age, alert, oriented  Eyes:               Anicteric sclera, moist conjunctiva, no lid lag                           PERRL, EOMI   Heent:             NC/AT                          Oropharynx clear                          Normal hearing  Lungs:             Normal effort, non-labored breathing          CV:                  Normal S1/S2                          No lower extremity edema  ABD:               Soft, rounded, non-distended                 Psych:             Appropriate affect  Neuro:             CN 2-12 appear intact    Past Medical History:   Diagnosis Date   • Allergic contact dermatitis    • Allergic rhinitis    • Anesthesia     uncontrolled bp and paralysis   • Anesthesia     2014 increased bp after surgery and paralysis (C3 surgery)   • Anxiety    • Anxiety    • Arthritis    • Asthma    • Asthma    • Benign lipomatous neoplasm      Small lipoma clinically right chest wall      • Chest wall tenderness     right side of sternum   • Epilepsy     myoclonic movement disorder   • Food allergy    • [place] Tourette's syndrome     neurology had been seeing him in [place]      • Headache    • Hypertension    • Hypertension    • Hypertension     • Insomnia    • Internal hemorrhoids    • Kyphosis    • Low back pain    • Malaise and fatigue    • Rhonda    • Multiple joint pain    • Myoclonic disorder    • Need for influenza vaccination    • Neuralgia and neuritis    • OCD (obsessive compulsive disorder)    • Polyp of intestine     +FH, leiomyoma      • Psoriasis     psoriasis vs eczema vs fungal infections   • Radiculitis    • [place] spotted fever    • Seborrheic dermatitis    • Skin sensation disturbance    • Spasm of back muscles    • Spinal stenosis of lumbar region    • Tinnitus    • Urinary frequency    • Wears glasses        Current Outpatient Medications:   •  acetaminophen-codeine (TYLENOL #3) 300-30 MG per tablet, , Disp: , Rfl:   •  Advair Diskus 250-50 MCG/ACT DISKUS, INHALE 1 PUFF 2 TIMES A DAY. RINSE MOUTH WITH WATER AFTER USE. DO NOT SWALLOW., Disp: , Rfl:   •  albuterol (PROAIR RESPICLICK) 108 (90 BASE) MCG/ACT inhaler, Inhale 2 puffs Every 4 (Four) Hours As Needed for Shortness of Air., Disp: , Rfl:   •  amitriptyline (ELAVIL) 25 MG tablet, TAKE 1 TABLET (25 MG) BY MOUTH AT BEDTIME, Disp: , Rfl:   •  amLODIPine (NORVASC) 5 MG tablet, , Disp: , Rfl:   •  aspirin 81 MG EC tablet, Take 1 tablet by mouth Daily., Disp: , Rfl:   •  atorvastatin (LIPITOR) 40 MG tablet, Take 1 tablet by mouth Daily., Disp: , Rfl:   •  azelastine (ASTELIN) 0.1 % nasal spray, 1 spray into the nostril(s) as directed by provider 2 (Two) Times a Day. Use in each nostril as directed, Disp: , Rfl:   •  baclofen (LIORESAL) 20 MG tablet, TAKE ONE TABLET BY MOUTH THREE TIMES A DAY, Disp: 90 tablet, Rfl: 0  •  betamethasone dipropionate (DIPROLENE) 0.05 % lotion, betamethasone dipropionate 0.05 % lotion  APPLY TO AFFECTED AREAS OF SCALP BY TOPICAL ROUTE 1-2 TIMES PER DAY ; RUB IN GENTLY AND COMPLETELY. DO NOT RINSE OUT., Disp: , Rfl:   •  cetirizine (zyrTEC) 10 MG tablet, Take 1 tablet by mouth Daily., Disp: , Rfl:   •  Cholecalciferol (Vitamin D3) 50 MCG (2000 UT)  capsule, , Disp: , Rfl:   •  clobetasol (CLOBEX) 0.05 % lotion, Apply  topically to the appropriate area as directed 1 (One) Time Per Week., Disp: , Rfl:   •  clonazePAM (KlonoPIN) 1 MG tablet, TAKE 1 TABLET (1 MG) BY MOUTH 3 TIMES DAILY AS NEEDED, Disp: , Rfl:   •  clonazePAM (KlonoPIN) 1 MG tablet, Take 1 tablet by mouth., Disp: , Rfl:   •  clotrimazole-betamethasone (LOTRISONE) 1-0.05 % lotion, Apply  topically to the appropriate area as directed 2 (Two) Times a Day., Disp: , Rfl:   •  cyanocobalamin 1000 MCG/ML injection, , Disp: , Rfl:   •  cyclopentolate (CYCLOGYL) 1 % ophthalmic solution, Administer 1 drop to both eyes Daily As Needed., Disp: , Rfl:   •  Digestive Enzymes capsule, Take 1 capsule by mouth Daily., Disp: , Rfl:   •   MG capsule, TAKE 1 TABLET (100 MG) BY MOUTH 2 TIMES DAILY AS NEEDED, Disp: , Rfl:   •  EPINEPHrine (EPIPEN) 0.3 MG/0.3ML solution auto-injector injection, Inject 0.3 mg into the appropriate muscle as directed by prescriber As Needed., Disp: , Rfl:   •  Eucrisa 2 % ointment, APPLY TOPICALLY 2 TIMES DAILY, Disp: , Rfl:   •  famotidine (PEPCID) 20 MG tablet, Take 1 tablet by mouth 2 (Two) Times a Day., Disp: , Rfl:   •  famotidine (PEPCID) 40 MG tablet, Take 1 tablet by mouth Daily., Disp: , Rfl:   •  fluticasone (FLONASE) 50 MCG/ACT nasal spray, 2 sprays into the nostril(s) as directed by provider Daily., Disp: , Rfl:   •  folic acid (FOLVITE) 1 MG tablet, Take 1 tablet by mouth Daily., Disp: , Rfl:   •  hydrocortisone 2.5 % cream, APPLY TO AFFECTED AREAS OF FACE ONCE DAILY. USE ALONG WITH NYSTATIN. DECREASE USE WHEN IMPROVED., Disp: , Rfl:   •  hydrocortisone 2.5 % lotion, Apply  topically to the appropriate area as directed 2 (Two) Times a Day., Disp: , Rfl:   •  levocetirizine (XYZAL) 5 MG tablet, Take 1 tablet by mouth every night at bedtime., Disp: , Rfl:   •  losartan-hydrochlorothiazide (HYZAAR) 50-12.5 MG per tablet, , Disp: , Rfl:   •  MagDelay 64 MG DR tablet, , Disp:  , Rfl:   •  metoprolol succinate XL (TOPROL-XL) 100 MG 24 hr tablet, Take 1 tablet by mouth Daily., Disp: , Rfl:   •  mometasone (NASONEX) 50 MCG/ACT nasal spray, 2 sprays into the nostril(s) as directed by provider Daily., Disp: , Rfl:   •  mometasone (NASONEX) 50 MCG/ACT nasal spray, 2 sprays by Each Nare route 2 (Two) Times a Day., Disp: , Rfl:   •  montelukast (SINGULAIR) 10 MG tablet, Take 1 tablet by mouth Daily., Disp: , Rfl:   •  nystatin (MYCOSTATIN) 360772 UNIT/GM cream, APPLY TO THE AFFECTED AREAS OF FACE BY TOPICAL ROUTE 2 TIMES PER DAY. DECREASE USE WHEN IMPROVED., Disp: , Rfl:   •  omeprazole (priLOSEC) 20 MG capsule, Take 1 capsule by mouth Before Breakfast., Disp: , Rfl:   •  polyethylene glycol (MIRALAX) 17 GM/SCOOP powder, Take 17 g by mouth Daily., Disp: , Rfl:   •  potassium chloride (K-DUR,KLOR-CON) 20 MEQ CR tablet, TAKE 1 TABLET (20 MEQ) BY MOUTH 2 TIMES DAILY, Disp: , Rfl:   •  potassium chloride ER (K-TAB) 20 MEQ tablet controlled-release ER tablet, Take 1 tablet by mouth 2 (Two) Times a Day., Disp: , Rfl:   •  prednisoLONE acetate (PRED FORTE) 1 % ophthalmic suspension, INSTILL ONE DROP IN LEFT EYE AS DIRECTED, Disp: , Rfl: 1  •  primidone (MYSOLINE) 50 MG tablet, Take 2 tablets by mouth Every Night., Disp: , Rfl:   •  primidone (MYSOLINE) 50 MG tablet, Take 3 tablets by mouth., Disp: , Rfl:   •  promethazine (PHENERGAN) 25 MG tablet, TAKE 1 TABLET BY MOUTH EVERY 6 (SIX) HOURS AS NEEDED FOR NAUSEA OR VOMITING., Disp: 30 tablet, Rfl: 1  •  promethazine (PHENERGAN) 25 MG tablet, Take 1 tablet by mouth Every 6 (Six) Hours As Needed., Disp: , Rfl:   •  RABEprazole (ACIPHEX) 20 MG EC tablet, , Disp: , Rfl:   •  tamsulosin (FLOMAX) 0.4 MG capsule 24 hr capsule, Take 1 capsule by mouth Every Night. (Patient not taking: Reported on 5/31/2023), Disp: 30 capsule, Rfl: 2    WBC   Date Value Ref Range Status   04/27/2023 5.1 4.1 - 10.9 THOUS/uL Final     RBC   Date Value Ref Range Status   04/27/2023  5.00 3.35 - 5.50 MIL/uL Final     Hemoglobin   Date Value Ref Range Status   04/27/2023 15.4 12.9 - 16.6 GM/DL Final     Hematocrit   Date Value Ref Range Status   04/27/2023 46.7 38.0 - 48.0 % Final     MCV   Date Value Ref Range Status   04/27/2023 93.4 81.0 - 95.0 FL Final     MCH   Date Value Ref Range Status   04/27/2023 30.8 27.0 - 33.0 PG Final     MCHC   Date Value Ref Range Status   04/27/2023 33.0 33.0 - 37.0 G/DL Final     RDW   Date Value Ref Range Status   04/27/2023 13.6 11.5 - 14.5 % Final     RDW-SD   Date Value Ref Range Status   04/27/2023 46.3 (H) 35.0 - 42.0 FL Final     MPV   Date Value Ref Range Status   04/27/2023 12.7 (H) 7.4 - 10.4 FL Final     Platelets   Date Value Ref Range Status   04/27/2023 193 130 - 400 THOUS/uL Final     Neutrophil Rel %   Date Value Ref Range Status   04/27/2023 61.1 42.2 - 75.2 % Final     Lymphocyte Rel %   Date Value Ref Range Status   04/27/2023 24.7 20.5 - 51.1 % Final     Monocyte Rel %   Date Value Ref Range Status   04/27/2023 10.8 (H) 1.7 - 9.3 % Final     Eosinophil %   Date Value Ref Range Status   04/27/2023 2.4 1.0 - 3.0 % Final     Basophil Rel %   Date Value Ref Range Status   04/27/2023 0.6 0.0 - 2.0 % Final     Immature Grans %   Date Value Ref Range Status   04/27/2023 0.4 0.0 - 0.4 % Final     Comment:     IG PARAMETER REFLECTS THE  COMBINATION OF METAMYELOCYTES,  MYELOCYTES, AND PROMYELOCYTES.     Neutrophils Absolute   Date Value Ref Range Status   04/27/2023 3.1 1.7 - 8.7 THOUS/uL Final     Lymphocytes Absolute   Date Value Ref Range Status   04/27/2023 1.3 0.8 - 5.6 THOUS/uL Final     Monocytes Absolute   Date Value Ref Range Status   04/27/2023 0.6 0.1 - 1.0 THOUS/uL Final     Eosinophils Absolute   Date Value Ref Range Status   04/27/2023 0.1 0.0 - 0.3 THOUS/uL Final     Basophils Absolute   Date Value Ref Range Status   04/27/2023 0.0 0.0 - 0.2 THOUS/uL Final     Immature Grans, Absolute   Date Value Ref Range Status   04/27/2023 0.02 0.00  - 0.04 THOUS/uL Final     [facility]   Date Value Ref Range Status   04/27/2023 0.0 0 /100 WBC'S Final   02/10/2021 0.0 0.0 - 0.2 /100 WBC Final       Lab Results   Component Value Date    GLUCOSE 75 05/02/2022    BUN 14 05/02/2022    CREATININE 0.97 05/02/2022    EGFR 90.5 05/02/2022    BCR 14.4 05/02/2022    K 3.9 05/02/2022    CO2 24.2 05/02/2022    CALCIUM 9.4 05/02/2022    ALBUMIN 4.20 02/09/2021    BILITOT 1.6 (H) 02/09/2021    AST 15 02/09/2021    ALT 13 02/09/2021       Assessment and Plan:    1. Complex sleep apnea - Established, stable (1)  1. Compliant with PAP therapy  2. Continue PAP as prescribed  3. Script for PAP supplies - wants to try nasal wisp or pillows with hose hook up at top of head  4. Pertinent labs were reviewed as listed above  5. Return to clinic in 1 year with compliance report unless change in symptoms in interim period      I spent 20 minutes caring for Evangelist on this date of service. This time includes time spent by me in the following activities: preparing for the visit, reviewing tests, obtaining and/or reviewing a separately obtained history, performing a medically appropriate examination and/or evaluation , counseling and educating the patient/family/caregiver, documenting information in the medical record and care coordination; discussing PAP therapy, PAP compliance and PAP maintenance    RTC in 12 months. Patient agrees to return sooner if changes in symptoms.        This document has been electronically signed by MILLICENT Paulino on May 31, 2023 13:33 CDT          CC: Ashley Watts MD          No ref. provider found

## 2023-06-13 ENCOUNTER — LAB (OUTPATIENT)
Dept: LAB | Facility: HOSPITAL | Age: 58
End: 2023-06-13
Payer: COMMERCIAL

## 2023-06-13 ENCOUNTER — TRANSCRIBE ORDERS (OUTPATIENT)
Dept: LAB | Facility: HOSPITAL | Age: 58
End: 2023-06-13
Payer: COMMERCIAL

## 2023-06-13 DIAGNOSIS — I10 ESSENTIAL HYPERTENSION: Primary | ICD-10-CM

## 2023-06-13 DIAGNOSIS — I10 ESSENTIAL HYPERTENSION: ICD-10-CM

## 2023-06-13 DIAGNOSIS — N39.0 URINARY TRACT INFECTION WITHOUT HEMATURIA, SITE UNSPECIFIED: ICD-10-CM

## 2023-06-13 LAB
BILIRUB UR QL STRIP: NEGATIVE
CLARITY UR: CLEAR
COLOR UR: YELLOW
GLUCOSE UR STRIP-MCNC: NEGATIVE MG/DL
HGB UR QL STRIP.AUTO: NEGATIVE
KETONES UR QL STRIP: NEGATIVE
LEUKOCYTE ESTERASE UR QL STRIP.AUTO: NEGATIVE
NITRITE UR QL STRIP: NEGATIVE
PH UR STRIP.AUTO: 6 [PH] (ref 5–9)
PROT UR QL STRIP: NEGATIVE
SP GR UR STRIP: 1.01 (ref 1–1.03)
UROBILINOGEN UR QL STRIP: NORMAL

## 2023-06-13 PROCEDURE — 81003 URINALYSIS AUTO W/O SCOPE: CPT

## 2023-06-13 PROCEDURE — 80048 BASIC METABOLIC PNL TOTAL CA: CPT

## 2023-06-13 PROCEDURE — 36415 COLL VENOUS BLD VENIPUNCTURE: CPT

## 2023-06-14 LAB
ANION GAP SERPL CALCULATED.3IONS-SCNC: 13.2 MMOL/L (ref 5–15)
BUN SERPL-MCNC: 15 MG/DL (ref 6–20)
BUN/CREAT SERPL: 12.9 (ref 7–25)
CALCIUM SPEC-SCNC: 10.3 MG/DL (ref 8.6–10.5)
CHLORIDE SERPL-SCNC: 103 MMOL/L (ref 98–107)
CO2 SERPL-SCNC: 25.8 MMOL/L (ref 22–29)
CREAT SERPL-MCNC: 1.16 MG/DL (ref 0.76–1.27)
EGFRCR SERPLBLD CKD-EPI 2021: 72.6 ML/MIN/1.73
GLUCOSE SERPL-MCNC: 138 MG/DL (ref 65–99)
POTASSIUM SERPL-SCNC: 3.8 MMOL/L (ref 3.5–5.2)
SODIUM SERPL-SCNC: 142 MMOL/L (ref 136–145)

## (undated) DEVICE — GOWN,AURORA,NOREINF,RAGLAN,XL,STERILE: Brand: MEDLINE

## (undated) DEVICE — COAGULATOR SXN HNDSWITCH 10F16IN

## (undated) DEVICE — SPLNT NASL AIRWY SIL STRL

## (undated) DEVICE — SUT NLY 2/0 664G

## (undated) DEVICE — GLV SURG SENSICARE GREEN W/ALOE PF LF 6.5 STRL

## (undated) DEVICE — TRY IRR

## (undated) DEVICE — SUT PDS 4-0 RB-1 Z304H

## (undated) DEVICE — CONTAINER,SPECIMEN,OR STERILE,4OZ: Brand: MEDLINE

## (undated) DEVICE — TP SXN YANKR BLB TIP W/TBG 10F LF STRL

## (undated) DEVICE — SUT PLAIN 1 4/0 1828H

## (undated) DEVICE — SOL IRR NACL 0.9PCT BT 1000ML

## (undated) DEVICE — CODMAN® SURGICAL PATTIES 1/2" X 3" (1.27CM X 7.62CM): Brand: CODMAN®

## (undated) DEVICE — PK ENT LF 60

## (undated) DEVICE — UNDYED BRAIDED (POLYGLACTIN 910), SYNTHETIC ABSORBABLE SUTURE: Brand: COATED VICRYL

## (undated) DEVICE — INTENDED FOR TISSUE SEPARATION, AND OTHER PROCEDURES THAT REQUIRE A SHARP SURGICAL BLADE TO PUNCTURE OR CUT.: Brand: BARD-PARKER ® CARBON RIB-BACK BLADES

## (undated) DEVICE — Device

## (undated) DEVICE — GLV SURG SENSICARE MICRO PF LF 7.5 STRL

## (undated) DEVICE — DENTAL NEEDLE,METAL HUB,27 G LONG: Brand: MONOJECT

## (undated) DEVICE — GLV SURG SENSICARE GREEN W/ALOE PF LF 6 STRL

## (undated) DEVICE — DRSNG TELFA PAD NONADH STR 1S 3X8IN

## (undated) DEVICE — STERILE POLYISOPRENE POWDER-FREE SURGICAL GLOVES WITH EMOLLIENT COATING: Brand: PROTEXIS

## (undated) DEVICE — BLD BIPOL DIEGO SMR STR STD TYPE A 2MM